# Patient Record
Sex: FEMALE | Race: WHITE | Employment: FULL TIME | URBAN - METROPOLITAN AREA
[De-identification: names, ages, dates, MRNs, and addresses within clinical notes are randomized per-mention and may not be internally consistent; named-entity substitution may affect disease eponyms.]

---

## 2017-01-20 ENCOUNTER — OFFICE VISIT (OUTPATIENT)
Dept: FAMILY MEDICINE CLINIC | Age: 32
End: 2017-01-20

## 2017-01-20 VITALS
BODY MASS INDEX: 25.76 KG/M2 | DIASTOLIC BLOOD PRESSURE: 80 MMHG | HEIGHT: 62 IN | TEMPERATURE: 99.6 F | RESPIRATION RATE: 20 BRPM | SYSTOLIC BLOOD PRESSURE: 130 MMHG | WEIGHT: 140 LBS | HEART RATE: 82 BPM

## 2017-01-20 DIAGNOSIS — R10.9 ABDOMINAL PAIN, UNSPECIFIED LOCATION: Primary | ICD-10-CM

## 2017-01-20 LAB
BILIRUBIN, POC: NORMAL
BLOOD URINE, POC: NORMAL
CLARITY, POC: CLEAR
COLOR, POC: NORMAL
GLUCOSE URINE, POC: NORMAL
KETONES, POC: NORMAL
LEUKOCYTE EST, POC: NORMAL
NITRITE, POC: NORMAL
PH, POC: 6.5
PROTEIN, POC: NORMAL
SPECIFIC GRAVITY, POC: 1.02
UROBILINOGEN, POC: NORMAL

## 2017-01-20 PROCEDURE — 81003 URINALYSIS AUTO W/O SCOPE: CPT | Performed by: FAMILY MEDICINE

## 2017-01-20 PROCEDURE — 99213 OFFICE O/P EST LOW 20 MIN: CPT | Performed by: FAMILY MEDICINE

## 2017-01-20 RX ORDER — CIPROFLOXACIN 500 MG/1
500 TABLET, FILM COATED ORAL 2 TIMES DAILY
Qty: 20 TABLET | Refills: 0 | Status: SHIPPED | OUTPATIENT
Start: 2017-01-20 | End: 2017-01-30

## 2017-01-20 ASSESSMENT — ENCOUNTER SYMPTOMS
DIARRHEA: 1
ABDOMINAL PAIN: 1
VOMITING: 0
CONSTIPATION: 0
NAUSEA: 1
BELCHING: 0

## 2017-03-07 ENCOUNTER — OFFICE VISIT (OUTPATIENT)
Dept: FAMILY MEDICINE CLINIC | Age: 32
End: 2017-03-07

## 2017-03-07 VITALS
HEART RATE: 70 BPM | WEIGHT: 141.6 LBS | OXYGEN SATURATION: 99 % | BODY MASS INDEX: 26.06 KG/M2 | RESPIRATION RATE: 16 BRPM | TEMPERATURE: 98.6 F | HEIGHT: 62 IN | DIASTOLIC BLOOD PRESSURE: 80 MMHG | SYSTOLIC BLOOD PRESSURE: 120 MMHG

## 2017-03-07 DIAGNOSIS — R09.89 THROAT TIGHTNESS: Primary | ICD-10-CM

## 2017-03-07 DIAGNOSIS — J30.9 ALLERGIC RHINITIS, UNSPECIFIED ALLERGIC RHINITIS TRIGGER, UNSPECIFIED RHINITIS SEASONALITY: ICD-10-CM

## 2017-03-07 PROCEDURE — 99213 OFFICE O/P EST LOW 20 MIN: CPT | Performed by: NURSE PRACTITIONER

## 2017-03-07 RX ORDER — METHYLPREDNISOLONE 4 MG/1
TABLET ORAL
Qty: 1 KIT | Refills: 0 | Status: SHIPPED | OUTPATIENT
Start: 2017-03-07 | End: 2017-03-13

## 2017-03-07 ASSESSMENT — ENCOUNTER SYMPTOMS
VOICE CHANGE: 0
TROUBLE SWALLOWING: 0
VOMITING: 0
RHINORRHEA: 0
NAUSEA: 0
ABDOMINAL PAIN: 0
SHORTNESS OF BREATH: 0
COUGH: 0
SORE THROAT: 1

## 2017-05-10 ENCOUNTER — OFFICE VISIT (OUTPATIENT)
Dept: FAMILY MEDICINE CLINIC | Age: 32
End: 2017-05-10

## 2017-05-10 DIAGNOSIS — N63.10 BREAST MASS, RIGHT: Primary | ICD-10-CM

## 2017-05-10 DIAGNOSIS — Z12.31 VISIT FOR SCREENING MAMMOGRAM: ICD-10-CM

## 2017-05-10 DIAGNOSIS — N63.20 BREAST MASS, LEFT: ICD-10-CM

## 2017-05-10 PROCEDURE — 99214 OFFICE O/P EST MOD 30 MIN: CPT | Performed by: FAMILY MEDICINE

## 2017-05-10 PROCEDURE — G0444 DEPRESSION SCREEN ANNUAL: HCPCS | Performed by: FAMILY MEDICINE

## 2017-05-10 ASSESSMENT — PATIENT HEALTH QUESTIONNAIRE - PHQ9
5. POOR APPETITE OR OVEREATING: 2
9. THOUGHTS THAT YOU WOULD BE BETTER OFF DEAD, OR OF HURTING YOURSELF: 0
7. TROUBLE CONCENTRATING ON THINGS, SUCH AS READING THE NEWSPAPER OR WATCHING TELEVISION: 2
3. TROUBLE FALLING OR STAYING ASLEEP: 2
SUM OF ALL RESPONSES TO PHQ QUESTIONS 1-9: 14
4. FEELING TIRED OR HAVING LITTLE ENERGY: 0
6. FEELING BAD ABOUT YOURSELF - OR THAT YOU ARE A FAILURE OR HAVE LET YOURSELF OR YOUR FAMILY DOWN: 2
SUM OF ALL RESPONSES TO PHQ9 QUESTIONS 1 & 2: 3
1. LITTLE INTEREST OR PLEASURE IN DOING THINGS: 0
2. FEELING DOWN, DEPRESSED OR HOPELESS: 3
8. MOVING OR SPEAKING SO SLOWLY THAT OTHER PEOPLE COULD HAVE NOTICED. OR THE OPPOSITE, BEING SO FIGETY OR RESTLESS THAT YOU HAVE BEEN MOVING AROUND A LOT MORE THAN USUAL: 3
10. IF YOU CHECKED OFF ANY PROBLEMS, HOW DIFFICULT HAVE THESE PROBLEMS MADE IT FOR YOU TO DO YOUR WORK, TAKE CARE OF THINGS AT HOME, OR GET ALONG WITH OTHER PEOPLE: 1

## 2017-05-15 VITALS
DIASTOLIC BLOOD PRESSURE: 70 MMHG | SYSTOLIC BLOOD PRESSURE: 128 MMHG | TEMPERATURE: 97.4 F | OXYGEN SATURATION: 96 % | HEART RATE: 63 BPM | WEIGHT: 137.13 LBS | BODY MASS INDEX: 25.23 KG/M2 | HEIGHT: 62 IN

## 2017-05-18 ENCOUNTER — HOSPITAL ENCOUNTER (OUTPATIENT)
Dept: WOMENS IMAGING | Age: 32
Discharge: HOME | End: 2017-05-18
Payer: COMMERCIAL

## 2017-05-18 ENCOUNTER — HOSPITAL ENCOUNTER (OUTPATIENT)
Dept: ULTRASOUND IMAGING | Age: 32
Discharge: HOME | End: 2017-05-18
Payer: COMMERCIAL

## 2017-05-18 DIAGNOSIS — N63.10 BREAST MASS, RIGHT: ICD-10-CM

## 2017-05-18 DIAGNOSIS — N63.0 BREAST LUMP: ICD-10-CM

## 2017-05-18 PROCEDURE — G0204 DX MAMMO INCL CAD BI: HCPCS

## 2017-05-18 PROCEDURE — 76642 ULTRASOUND BREAST LIMITED: CPT

## 2017-10-24 ENCOUNTER — NURSE ONLY (OUTPATIENT)
Dept: FAMILY MEDICINE CLINIC | Age: 32
End: 2017-10-24

## 2017-10-24 DIAGNOSIS — Z23 NEED FOR INFLUENZA VACCINATION: Primary | ICD-10-CM

## 2017-10-24 PROCEDURE — 90688 IIV4 VACCINE SPLT 0.5 ML IM: CPT | Performed by: FAMILY MEDICINE

## 2017-10-24 PROCEDURE — 90471 IMMUNIZATION ADMIN: CPT | Performed by: FAMILY MEDICINE

## 2017-11-07 DIAGNOSIS — Z12.39 SCREENING FOR BREAST CANCER: Primary | ICD-10-CM

## 2017-11-14 ENCOUNTER — HOSPITAL ENCOUNTER (OUTPATIENT)
Dept: WOMENS IMAGING | Age: 32
Discharge: HOME | End: 2017-11-14
Payer: COMMERCIAL

## 2017-11-14 DIAGNOSIS — Z12.39 SCREENING FOR BREAST CANCER: ICD-10-CM

## 2017-11-14 PROCEDURE — G0202 SCR MAMMO BI INCL CAD: HCPCS

## 2017-11-24 ENCOUNTER — OFFICE VISIT (OUTPATIENT)
Dept: FAMILY MEDICINE CLINIC | Age: 32
End: 2017-11-24

## 2017-11-24 VITALS
HEART RATE: 70 BPM | WEIGHT: 143.6 LBS | BODY MASS INDEX: 26.43 KG/M2 | OXYGEN SATURATION: 98 % | SYSTOLIC BLOOD PRESSURE: 128 MMHG | TEMPERATURE: 98.5 F | DIASTOLIC BLOOD PRESSURE: 68 MMHG | HEIGHT: 62 IN | RESPIRATION RATE: 16 BRPM

## 2017-11-24 DIAGNOSIS — L25.9 CONTACT DERMATITIS, UNSPECIFIED CONTACT DERMATITIS TYPE, UNSPECIFIED TRIGGER: Primary | ICD-10-CM

## 2017-11-24 PROCEDURE — G8419 CALC BMI OUT NRM PARAM NOF/U: HCPCS | Performed by: NURSE PRACTITIONER

## 2017-11-24 PROCEDURE — 1036F TOBACCO NON-USER: CPT | Performed by: NURSE PRACTITIONER

## 2017-11-24 PROCEDURE — 99213 OFFICE O/P EST LOW 20 MIN: CPT | Performed by: NURSE PRACTITIONER

## 2017-11-24 PROCEDURE — G8484 FLU IMMUNIZE NO ADMIN: HCPCS | Performed by: NURSE PRACTITIONER

## 2017-11-24 PROCEDURE — G8427 DOCREV CUR MEDS BY ELIG CLIN: HCPCS | Performed by: NURSE PRACTITIONER

## 2017-11-24 RX ORDER — CLOTRIMAZOLE AND BETAMETHASONE DIPROPIONATE 10; .64 MG/G; MG/G
CREAM TOPICAL
Qty: 45 G | Refills: 0 | Status: SHIPPED | OUTPATIENT
Start: 2017-11-24 | End: 2018-02-21 | Stop reason: ALTCHOICE

## 2017-11-27 ENCOUNTER — OFFICE VISIT (OUTPATIENT)
Dept: FAMILY MEDICINE CLINIC | Age: 32
End: 2017-11-27

## 2017-11-27 VITALS
WEIGHT: 143.06 LBS | SYSTOLIC BLOOD PRESSURE: 120 MMHG | HEART RATE: 72 BPM | RESPIRATION RATE: 12 BRPM | BODY MASS INDEX: 26.33 KG/M2 | TEMPERATURE: 97.2 F | DIASTOLIC BLOOD PRESSURE: 84 MMHG | HEIGHT: 62 IN

## 2017-11-27 DIAGNOSIS — L03.115 BILATERAL CELLULITIS OF LOWER LEG: Primary | ICD-10-CM

## 2017-11-27 DIAGNOSIS — L03.116 BILATERAL CELLULITIS OF LOWER LEG: Primary | ICD-10-CM

## 2017-11-27 PROCEDURE — 99213 OFFICE O/P EST LOW 20 MIN: CPT | Performed by: FAMILY MEDICINE

## 2017-11-27 PROCEDURE — G8484 FLU IMMUNIZE NO ADMIN: HCPCS | Performed by: FAMILY MEDICINE

## 2017-11-27 PROCEDURE — 1036F TOBACCO NON-USER: CPT | Performed by: FAMILY MEDICINE

## 2017-11-27 PROCEDURE — G8419 CALC BMI OUT NRM PARAM NOF/U: HCPCS | Performed by: FAMILY MEDICINE

## 2017-11-27 PROCEDURE — G8427 DOCREV CUR MEDS BY ELIG CLIN: HCPCS | Performed by: FAMILY MEDICINE

## 2017-11-27 RX ORDER — CEPHALEXIN 500 MG/1
500 CAPSULE ORAL 3 TIMES DAILY
Qty: 30 CAPSULE | Refills: 0 | Status: SHIPPED | OUTPATIENT
Start: 2017-11-27 | End: 2017-12-07

## 2017-11-27 NOTE — PROGRESS NOTES
 Scoliosis      Past Surgical History:   Procedure Laterality Date    WISDOM TOOTH EXTRACTION           REVIEW OF SYSTEMS:   REVIEW OF SYSTEMS:   Patient seen today for exam.  Denies any problems with hearing, headaches or vision. Denies any shortness of breath, chest pain, nausea or vomiting. No black stool, no blood in the stool. No heartburn. Denies any problems with constipation or diarrhea either. No dysuria type symptoms. Objective:     /84 (Site: Left Arm, Position: Sitting, Cuff Size: Medium Adult)   Pulse 72   Temp 97.2 °F (36.2 °C) (Temporal)   Resp 12   Ht 5' 2\" (1.575 m)   Wt 143 lb 1 oz (64.9 kg)   BMI 26.17 kg/m²     Physical Exam      O:  Alert and active female in no acute distress    EXTR:  Without edema./ good pulses bilat    Neurologic exam unremarkable. DTRs in upper and lower extremities within normal limits. Full strength noted    Skin- right lower leg with area of erythema 2\" x 5 mm wide no vesicles noted slightly tender to touch      Assessment:      1. Bilateral cellulitis of lower leg               Plan:        Orders Placed This Encounter   Medications    cephALEXin (KEFLEX) 500 MG capsule     Sig: Take 1 capsule by mouth 3 times daily for 10 days     Dispense:  30 capsule     Refill:  0     No orders of the defined types were placed in this encounter.           Health Maintenance Due   Topic Date Due    HIV screen  04/03/2002             Controlled Substances Monitoring:       will call me next 48 hours or worsens or changes in no        If anything worsens or changes please call us at once , follow-up in the office as planned,

## 2018-02-21 ENCOUNTER — OFFICE VISIT (OUTPATIENT)
Dept: FAMILY MEDICINE CLINIC | Age: 33
End: 2018-02-21
Payer: COMMERCIAL

## 2018-02-21 VITALS
HEIGHT: 62 IN | TEMPERATURE: 97.8 F | BODY MASS INDEX: 27.08 KG/M2 | WEIGHT: 147.19 LBS | SYSTOLIC BLOOD PRESSURE: 136 MMHG | HEART RATE: 79 BPM | RESPIRATION RATE: 12 BRPM | DIASTOLIC BLOOD PRESSURE: 86 MMHG

## 2018-02-21 DIAGNOSIS — Z00.00 ANNUAL PHYSICAL EXAM: Primary | ICD-10-CM

## 2018-02-21 DIAGNOSIS — R53.83 FATIGUE, UNSPECIFIED TYPE: ICD-10-CM

## 2018-02-21 DIAGNOSIS — F33.1 MODERATE EPISODE OF RECURRENT MAJOR DEPRESSIVE DISORDER (HCC): ICD-10-CM

## 2018-02-21 DIAGNOSIS — M67.479 GANGLION CYST OF FOOT: ICD-10-CM

## 2018-02-21 PROCEDURE — 99395 PREV VISIT EST AGE 18-39: CPT | Performed by: FAMILY MEDICINE

## 2018-02-22 NOTE — PROGRESS NOTES
and lower extremities within normal limits. Full strength noted    Skin- no lesions noted   Left ankle positive tenderness cyst noted for range of motion to ankle    Assessment:      1. Annual physical exam  Lipid Panel    CBC With Auto Differential    Comprehensive Metabolic Panel   2. Fatigue, unspecified type  TSH without Reflex    T4, Free    Vitamin D 25 Hydroxy    Vitamin B12 & Folate   3. Moderate episode of recurrent major depressive disorder ([facility] [address]     4. Ganglion cyst of foot               Plan:        No orders of the defined types were placed in this encounter.     Orders Placed This Encounter   Procedures    Lipid Panel     Standing Status:   Future     Standing Expiration Date:   2/21/2019     Order Specific Question:   Is Patient Fasting?/# of Hours     Answer:   yes    CBC With Auto Differential     Standing Status:   Future     Standing Expiration Date:   2/21/2019    Comprehensive Metabolic Panel     Standing Status:   Future     Standing Expiration Date:   2/21/2019    TSH without Reflex     Standing Status:   Future     Standing Expiration Date:   2/21/2019    T4, Free     Standing Status:   Future     Standing Expiration Date:   2/21/2019    Vitamin D 25 Hydroxy     Standing Status:   Future     Standing Expiration Date:   2/21/2019    Vitamin B12 & Folate     Standing Status:   Future     Standing Expiration Date:   2/21/2019           Health Maintenance Due   Topic Date Due    HIV screen  04/03/2002             Controlled Substances Monitoring:                we discussed a lot of things if her foot with worse would have her see Dr. Todd Cuadra is        If her mood does get worse she will call me she's really good candidate for low-dose SSRI such as Lexapro that we talked about                    If anything worsens or changes please call us at once , follow-up in the office as planned,

## 2018-02-23 DIAGNOSIS — Z00.00 ANNUAL PHYSICAL EXAM: ICD-10-CM

## 2018-02-23 DIAGNOSIS — R53.83 FATIGUE, UNSPECIFIED TYPE: ICD-10-CM

## 2018-02-23 LAB
ALBUMIN SERPL-MCNC: 4.1 G/DL (ref 3.9–4.9)
ALP BLD-CCNC: 46 U/L (ref 40–130)
ALT SERPL-CCNC: 20 U/L (ref 0–33)
ANION GAP SERPL CALCULATED.3IONS-SCNC: 14 MEQ/L (ref 7–13)
AST SERPL-CCNC: 24 U/L (ref 0–35)
BASOPHILS ABSOLUTE: 0.1 K/UL (ref 0–0.2)
BASOPHILS RELATIVE PERCENT: 1 %
BILIRUB SERPL-MCNC: 0.7 MG/DL (ref 0–1.2)
BUN BLDV-MCNC: 11 MG/DL (ref 6–20)
CALCIUM SERPL-MCNC: 9.5 MG/DL (ref 8.6–10.2)
CHLORIDE BLD-SCNC: 103 MEQ/L (ref 98–107)
CHOLESTEROL, TOTAL: 137 MG/DL (ref 0–199)
CO2: 23 MEQ/L (ref 22–29)
CREAT SERPL-MCNC: 0.72 MG/DL (ref 0.5–0.9)
EOSINOPHILS ABSOLUTE: 0.1 K/UL (ref 0–0.7)
EOSINOPHILS RELATIVE PERCENT: 2.2 %
FOLATE: >20 NG/ML (ref 7.3–26.1)
GFR AFRICAN AMERICAN: >60
GFR NON-AFRICAN AMERICAN: >60
GLOBULIN: 2.2 G/DL (ref 2.3–3.5)
GLUCOSE BLD-MCNC: 90 MG/DL (ref 74–109)
HCT VFR BLD CALC: 36.9 % (ref 37–47)
HDLC SERPL-MCNC: 51 MG/DL (ref 40–59)
HEMOGLOBIN: 12 G/DL (ref 12–16)
LDL CHOLESTEROL CALCULATED: 78 MG/DL (ref 0–129)
LYMPHOCYTES ABSOLUTE: 1.9 K/UL (ref 1–4.8)
LYMPHOCYTES RELATIVE PERCENT: 36.7 %
MCH RBC QN AUTO: 26.9 PG (ref 27–31.3)
MCHC RBC AUTO-ENTMCNC: 32.5 % (ref 33–37)
MCV RBC AUTO: 82.7 FL (ref 82–100)
MONOCYTES ABSOLUTE: 0.5 K/UL (ref 0.2–0.8)
MONOCYTES RELATIVE PERCENT: 9.6 %
NEUTROPHILS ABSOLUTE: 2.6 K/UL (ref 1.4–6.5)
NEUTROPHILS RELATIVE PERCENT: 50.5 %
PDW BLD-RTO: 16.3 % (ref 11.5–14.5)
PLATELET # BLD: 222 K/UL (ref 130–400)
POTASSIUM SERPL-SCNC: 4.7 MEQ/L (ref 3.5–5.1)
RBC # BLD: 4.46 M/UL (ref 4.2–5.4)
SODIUM BLD-SCNC: 140 MEQ/L (ref 132–144)
T4 FREE: 1.01 NG/DL (ref 0.93–1.7)
TOTAL PROTEIN: 6.3 G/DL (ref 6.4–8.1)
TRIGL SERPL-MCNC: 41 MG/DL (ref 0–200)
TSH SERPL DL<=0.05 MIU/L-ACNC: 1.9 UIU/ML (ref 0.27–4.2)
VITAMIN B-12: 1747 PG/ML (ref 232–1245)
VITAMIN D 25-HYDROXY: 22 NG/ML (ref 30–100)
WBC # BLD: 5.1 K/UL (ref 4.8–10.8)

## 2018-06-19 RX ORDER — ESCITALOPRAM OXALATE 10 MG/1
10 TABLET ORAL DAILY
Qty: 30 TABLET | Refills: 1 | Status: SHIPPED | OUTPATIENT
Start: 2018-06-19 | End: 2018-07-18 | Stop reason: SDUPTHER

## 2018-07-18 ENCOUNTER — OFFICE VISIT (OUTPATIENT)
Dept: FAMILY MEDICINE CLINIC | Age: 33
End: 2018-07-18
Payer: COMMERCIAL

## 2018-07-18 VITALS
BODY MASS INDEX: 26.13 KG/M2 | WEIGHT: 142 LBS | HEIGHT: 62 IN | TEMPERATURE: 97 F | RESPIRATION RATE: 20 BRPM | HEART RATE: 63 BPM | SYSTOLIC BLOOD PRESSURE: 118 MMHG | DIASTOLIC BLOOD PRESSURE: 78 MMHG

## 2018-07-18 DIAGNOSIS — F41.9 ANXIETY: ICD-10-CM

## 2018-07-18 DIAGNOSIS — F32.A DEPRESSION, UNSPECIFIED DEPRESSION TYPE: ICD-10-CM

## 2018-07-18 PROCEDURE — G8419 CALC BMI OUT NRM PARAM NOF/U: HCPCS | Performed by: FAMILY MEDICINE

## 2018-07-18 PROCEDURE — 99213 OFFICE O/P EST LOW 20 MIN: CPT | Performed by: FAMILY MEDICINE

## 2018-07-18 PROCEDURE — 1036F TOBACCO NON-USER: CPT | Performed by: FAMILY MEDICINE

## 2018-07-18 PROCEDURE — G8427 DOCREV CUR MEDS BY ELIG CLIN: HCPCS | Performed by: FAMILY MEDICINE

## 2018-07-18 RX ORDER — ESCITALOPRAM OXALATE 10 MG/1
10 TABLET ORAL DAILY
Qty: 30 TABLET | Refills: 5 | Status: SHIPPED | OUTPATIENT
Start: 2018-07-18 | End: 2018-12-03 | Stop reason: SDUPTHER

## 2018-07-18 NOTE — PROGRESS NOTES
Subjective:      Patient ID: Jim Greer is a 34 y.o. female. Chief Complaint   Patient presents with    Other     Patient present today requesting a paper be filled out - Medical statement for foster caregiver/ adoptive applicant     Depression     She would like like to follow up depression. She recently started takig Lexapro 10 mg 1 qd. She reports that she is nauseated since starting the medication. But nausea is starting to subside. She reports that 10 mg 1 qd is working significantly for depression       HPI    Here today follow-up on her depression doing pretty well states the Lexapro is really made things smooth for her increases serotonin started make her nauseous at first but is starting to subside          She does less angry at little things that used to bother her and feels like he is definitely going a right direction still seeing her counselor doing well with that         also thinking about fostering and possibly adoptive parents which is in she will be wonderful             Allergies   Allergen Reactions    Latex Rash    Cefdinir      Diarrhea        Levaquin [Levofloxacin]      Severe nerve pain     Outpatient Encounter Prescriptions as of 7/18/2018   Medication Sig Dispense Refill    escitalopram (LEXAPRO) 10 MG tablet Take 1 tablet by mouth daily 30 tablet 5    magnesium (MAGNESIUM-OXIDE) 250 MG TABS tablet Take 250 mg by mouth daily      [DISCONTINUED] escitalopram (LEXAPRO) 10 MG tablet Take 1 tablet by mouth daily 30 tablet 1     No facility-administered encounter medications on file as of 7/18/2018. Social History     Social History    Marital status:      Spouse name: N/A    Number of children: N/A    Years of education: N/A     Occupational History    Not on file.      Social History Main Topics    Smoking status: Never Smoker    Smokeless tobacco: Never Used    Alcohol use Yes     3 Cans of beer per week    Drug use: No    Sexual activity: Yes     Other Topics Concern    Not on file     Social History Narrative    No narrative on file     Family History   Problem Relation Age of Onset    Lupus Father     Breast Cancer Neg Hx     Cancer Neg Hx     Colon Cancer Neg Hx     Diabetes Neg Hx     Eclampsia Neg Hx     Hypertension Neg Hx     Ovarian Cancer Neg Hx      Labor Neg Hx     Spont Abortions Neg Hx     Stroke Neg Hx      Past Medical History:   Diagnosis Date    Acne     Anxiety 2018    Depression 2018    Scoliosis      Past Surgical History:   Procedure Laterality Date    WISDOM TOOTH EXTRACTION           REVIEW OF SYSTEMS:   REVIEW OF SYSTEMS:   Patient seen today for exam.  Denies any problems with hearing, headaches or vision. Denies any shortness of breath, chest pain, nausea or vomiting. No black stool, no blood in the stool. No heartburn. Denies any problems with constipation or diarrhea either. No dysuria type symptoms. Objective:     /78   Pulse 63   Temp 97 °F (36.1 °C) (Temporal)   Resp 20   Ht 5' 2\" (1.575 m)   Wt 142 lb (64.4 kg)   LMP 2018   Breastfeeding? No   BMI 25.97 kg/m²     Physical Exam      O:  Alert and active female in no acute distress  HEENT:  TMs clear. Pharynx neg. Nares clear, no drainage noted  Neck supple/ no adenopathy   HEART:  RRR without murmur/ no carotid bruits  LUNGS:  Clear to auscultation bilaterally, no wheeze or rhonchi noted  THYROID: neg masses or nodularity  ABDOMEN:  Soft x4. Bowel sounds positive. No masses or organomegaly,  Negative tenderness, guarding or rebound. EXTR:  Without edema./ good pulses bilat    Neurologic exam unremarkable. DTRs in upper and lower extremities within normal limits. Full strength noted    Skin- no lesions noted       Assessment:       Diagnosis Orders   1. Depression, unspecified depression type  escitalopram (LEXAPRO) 10 MG tablet   2.  Anxiety  escitalopram (LEXAPRO) 10 MG tablet             Plan:        Orders

## 2018-11-21 ENCOUNTER — NURSE ONLY (OUTPATIENT)
Dept: FAMILY MEDICINE CLINIC | Age: 33
End: 2018-11-21
Payer: COMMERCIAL

## 2018-11-21 DIAGNOSIS — Z23 NEED FOR INFLUENZA VACCINATION: Primary | ICD-10-CM

## 2018-11-21 PROCEDURE — 90688 IIV4 VACCINE SPLT 0.5 ML IM: CPT | Performed by: FAMILY MEDICINE

## 2018-11-21 PROCEDURE — 90471 IMMUNIZATION ADMIN: CPT | Performed by: FAMILY MEDICINE

## 2018-12-03 DIAGNOSIS — F41.9 ANXIETY: ICD-10-CM

## 2018-12-03 DIAGNOSIS — F32.A DEPRESSION, UNSPECIFIED DEPRESSION TYPE: ICD-10-CM

## 2018-12-03 RX ORDER — ESCITALOPRAM OXALATE 10 MG/1
10 TABLET ORAL DAILY
Qty: 30 TABLET | Refills: 1 | Status: SHIPPED | OUTPATIENT
Start: 2018-12-03 | End: 2019-02-07 | Stop reason: SDUPTHER

## 2019-02-07 DIAGNOSIS — F41.9 ANXIETY: ICD-10-CM

## 2019-02-07 DIAGNOSIS — F32.A DEPRESSION, UNSPECIFIED DEPRESSION TYPE: ICD-10-CM

## 2019-02-07 RX ORDER — ESCITALOPRAM OXALATE 10 MG/1
10 TABLET ORAL DAILY
Qty: 30 TABLET | Refills: 1 | Status: SHIPPED | OUTPATIENT
Start: 2019-02-07 | End: 2019-03-20 | Stop reason: SDUPTHER

## 2019-03-20 DIAGNOSIS — F41.9 ANXIETY: ICD-10-CM

## 2019-03-20 DIAGNOSIS — F32.A DEPRESSION, UNSPECIFIED DEPRESSION TYPE: ICD-10-CM

## 2019-03-20 RX ORDER — ESCITALOPRAM OXALATE 10 MG/1
10 TABLET ORAL DAILY
Qty: 30 TABLET | Refills: 0 | Status: SHIPPED | OUTPATIENT
Start: 2019-03-20

## 2023-03-10 ENCOUNTER — OFFICE VISIT (OUTPATIENT)
Dept: PRIMARY CARE | Facility: CLINIC | Age: 38
End: 2023-03-10
Payer: COMMERCIAL

## 2023-03-10 VITALS
WEIGHT: 208.13 LBS | SYSTOLIC BLOOD PRESSURE: 110 MMHG | TEMPERATURE: 97.4 F | BODY MASS INDEX: 38.3 KG/M2 | DIASTOLIC BLOOD PRESSURE: 70 MMHG | HEART RATE: 69 BPM | OXYGEN SATURATION: 97 % | HEIGHT: 62 IN | RESPIRATION RATE: 18 BRPM

## 2023-03-10 DIAGNOSIS — G44.209 ACUTE NON INTRACTABLE TENSION-TYPE HEADACHE: Primary | ICD-10-CM

## 2023-03-10 DIAGNOSIS — K13.79 HYPERTROPHY OF UVULA: ICD-10-CM

## 2023-03-10 PROBLEM — R39.9 UTI SYMPTOMS: Status: ACTIVE | Noted: 2023-03-10

## 2023-03-10 PROBLEM — Z20.822 ENCOUNTER FOR LABORATORY TESTING FOR COVID-19 VIRUS: Status: ACTIVE | Noted: 2023-03-10

## 2023-03-10 PROBLEM — N34.2 URETHRITIS: Status: ACTIVE | Noted: 2023-03-10

## 2023-03-10 PROBLEM — U07.1 COVID-19 VIRUS DETECTED: Status: ACTIVE | Noted: 2023-03-10

## 2023-03-10 PROBLEM — N63.0 BREAST LUMP: Status: ACTIVE | Noted: 2023-03-10

## 2023-03-10 PROBLEM — F32.A DEPRESSION: Status: ACTIVE | Noted: 2023-03-10

## 2023-03-10 PROBLEM — R53.83 FATIGUE: Status: ACTIVE | Noted: 2023-03-10

## 2023-03-10 PROBLEM — R10.11 ABDOMINAL PAIN, ACUTE, RIGHT UPPER QUADRANT: Status: ACTIVE | Noted: 2023-03-10

## 2023-03-10 PROBLEM — R05.9 COUGH: Status: ACTIVE | Noted: 2023-03-10

## 2023-03-10 PROBLEM — R51.9 HEADACHE: Status: ACTIVE | Noted: 2023-03-10

## 2023-03-10 PROBLEM — M79.10 MUSCLE ACHE: Status: ACTIVE | Noted: 2023-03-10

## 2023-03-10 PROBLEM — M79.2 ATYPICAL NEURALGIA: Status: ACTIVE | Noted: 2023-03-10

## 2023-03-10 PROBLEM — J38.3 OTHER DISEASES OF VOCAL CORDS: Status: ACTIVE | Noted: 2023-03-10

## 2023-03-10 PROBLEM — R09.A2 GLOBUS SENSATION: Status: ACTIVE | Noted: 2023-03-10

## 2023-03-10 PROBLEM — L30.8 ECZEMA CRAQUELE: Status: ACTIVE | Noted: 2023-03-10

## 2023-03-10 PROBLEM — R56.9 OBSERVED SEIZURE-LIKE ACTIVITY (MULTI): Status: ACTIVE | Noted: 2023-03-10

## 2023-03-10 PROBLEM — R42 DIZZINESS: Status: ACTIVE | Noted: 2023-03-10

## 2023-03-10 PROBLEM — M25.561 KNEE PAIN, RIGHT: Status: ACTIVE | Noted: 2023-03-10

## 2023-03-10 PROBLEM — F41.1 GENERALIZED ANXIETY DISORDER: Status: ACTIVE | Noted: 2023-03-10

## 2023-03-10 PROBLEM — R00.2 HEART PALPITATIONS: Status: ACTIVE | Noted: 2023-03-10

## 2023-03-10 PROBLEM — L98.9 SKIN LESION: Status: ACTIVE | Noted: 2023-03-10

## 2023-03-10 PROBLEM — M77.8 EXTENSOR TENDINITIS OF FOOT: Status: ACTIVE | Noted: 2023-03-10

## 2023-03-10 PROCEDURE — 1036F TOBACCO NON-USER: CPT | Performed by: FAMILY MEDICINE

## 2023-03-10 PROCEDURE — 99213 OFFICE O/P EST LOW 20 MIN: CPT | Performed by: FAMILY MEDICINE

## 2023-03-10 RX ORDER — METHYLPREDNISOLONE 4 MG/1
TABLET ORAL
COMMUNITY
Start: 2023-02-17 | End: 2023-03-10 | Stop reason: ALTCHOICE

## 2023-03-10 RX ORDER — NITROFURANTOIN 25; 75 MG/1; MG/1
CAPSULE ORAL EVERY 12 HOURS
COMMUNITY
Start: 2022-09-19 | End: 2023-03-10 | Stop reason: ALTCHOICE

## 2023-03-10 RX ORDER — AZITHROMYCIN 250 MG/1
TABLET, FILM COATED ORAL
COMMUNITY
Start: 2023-02-17 | End: 2023-03-10 | Stop reason: ALTCHOICE

## 2023-03-10 RX ORDER — ONDANSETRON 4 MG/1
4 TABLET, ORALLY DISINTEGRATING ORAL EVERY 8 HOURS PRN
Qty: 20 TABLET | Refills: 3 | Status: SHIPPED | OUTPATIENT
Start: 2023-03-10 | End: 2023-03-17

## 2023-03-10 RX ORDER — PHENTERMINE HYDROCHLORIDE 15 MG/1
CAPSULE ORAL
COMMUNITY
Start: 2022-12-01 | End: 2023-03-10 | Stop reason: ALTCHOICE

## 2023-03-10 RX ORDER — ESCITALOPRAM OXALATE 10 MG/1
1 TABLET ORAL DAILY
COMMUNITY
Start: 2019-07-29 | End: 2023-07-19

## 2023-03-10 NOTE — PROGRESS NOTES
"Subjective   Patient ID: Cassia Martin is a 37 y.o. female who presents for Migraine and inflamed uvula.  HPI  Patient presents in office today for migraines   This has been ongoing x 3 days   OTC ibuprofen 400 - 600 3 times a day   Pain is 8/10  Admits that this has happened before   Admits that it even hurts to touch her hair.     Also presents in office today for an inflamed Uvula   This has been ongoing x 2 days   Admits that she had a cough when she noticed this   Was on a Z-adrian about 3 weeks ago  Admits that this does look swollen   Admits that she would like this looked at today.     Review of systems  ; Patient seen today for exam denies any problems with headaches or vision, denies any shortness of breath chest pain nausea or vomiting, no black stool no blood in the stool no heartburn type symptoms denies any problems with constipation or diarrhea, and no dysuria-type symptoms    The patient's allergies medications were reviewed with them today    The patient's social family and surgical history or also reviewed here today, along with her past medical history.     Objective     Alert and active in  no acute distress  HEENT TMs clear oropharynx negative nares clear no drainage noted neck supple  With no adenopathy   Heart regular rate and rhythm without murmur and no carotid bruits  Lungs- clear to auscultation bilaterally, no wheeze or rhonchi noted  Thyroid -negative masses or nodularity  Abdomen- soft times four quadrants , bowel sounds positive no masses or organomegaly, negative tenderness guarding or rebound  Neurological exam unremarkable- DTRs in upper and lower extremities within normal limits.   skin -no lesions noted      /70 (BP Location: Right arm, Patient Position: Sitting, BP Cuff Size: Adult)   Pulse 69   Temp 36.3 °C (97.4 °F) (Temporal)   Resp 18   Ht 1.575 m (5' 2\")   Wt 94.4 kg (208 lb 2 oz)   SpO2 97%   BMI 38.07 kg/m²     Allergies   Allergen Reactions    Cefdinir " Diarrhea and Hives    Cyclosporine Hives    Latex Rash    Quinolones Rash    Sulfa (Sulfonamide Antibiotics) Hives and Rash       Assessment/Plan   Problem List Items Addressed This Visit          Nervous    Headache - Primary    Relevant Medications    ondansetron ODT (Zofran-ODT) 4 mg disintegrating tablet     Other Visit Diagnoses       Hypertrophy of uvula              Her child sees Dr. Guevara we will see him as her uvula is really long I does have some scar tissue on it I think it needs a slight clip as she does feel it oftentimes when she gets sick it definitely gets inflamed will let us know if things worsen or change    Also give her some samples of Nurtec to try 75 mcg as needed as needed along with Zofran to see if it helps with her headaches    If anything worsens or changes please call us at once, follow up in the office as planned,

## 2023-03-27 ENCOUNTER — OFFICE VISIT (OUTPATIENT)
Dept: PRIMARY CARE | Facility: CLINIC | Age: 38
End: 2023-03-27
Payer: COMMERCIAL

## 2023-03-27 VITALS
HEART RATE: 74 BPM | TEMPERATURE: 97.4 F | WEIGHT: 199.5 LBS | SYSTOLIC BLOOD PRESSURE: 102 MMHG | DIASTOLIC BLOOD PRESSURE: 80 MMHG | BODY MASS INDEX: 36.71 KG/M2 | HEIGHT: 62 IN | OXYGEN SATURATION: 98 % | RESPIRATION RATE: 18 BRPM

## 2023-03-27 DIAGNOSIS — Z00.00 ENCOUNTER FOR GENERAL HEALTH EXAMINATION: ICD-10-CM

## 2023-03-27 DIAGNOSIS — R11.2 NAUSEA AND VOMITING, UNSPECIFIED VOMITING TYPE: ICD-10-CM

## 2023-03-27 DIAGNOSIS — E66.09 CLASS 2 OBESITY DUE TO EXCESS CALORIES WITHOUT SERIOUS COMORBIDITY WITH BODY MASS INDEX (BMI) OF 36.0 TO 36.9 IN ADULT: ICD-10-CM

## 2023-03-27 DIAGNOSIS — R19.7 DIARRHEA, UNSPECIFIED TYPE: ICD-10-CM

## 2023-03-27 DIAGNOSIS — R10.84 GENERALIZED ABDOMINAL PAIN: Primary | ICD-10-CM

## 2023-03-27 PROBLEM — R56.9 OBSERVED SEIZURE-LIKE ACTIVITY (MULTI): Status: RESOLVED | Noted: 2023-03-10 | Resolved: 2023-03-27

## 2023-03-27 LAB
POC APPEARANCE, URINE: CLEAR
POC BILIRUBIN, URINE: NEGATIVE
POC BLOOD, URINE: NEGATIVE
POC COLOR, URINE: YELLOW
POC GLUCOSE, URINE: NEGATIVE MG/DL
POC KETONES, URINE: NEGATIVE MG/DL
POC LEUKOCYTES, URINE: NEGATIVE
POC NITRITE,URINE: NEGATIVE
POC PH, URINE: 5.5 PH
POC PROTEIN, URINE: NEGATIVE MG/DL
POC SPECIFIC GRAVITY, URINE: <=1.005
POC UROBILINOGEN, URINE: 0.2 EU/DL

## 2023-03-27 PROCEDURE — 3008F BODY MASS INDEX DOCD: CPT | Performed by: FAMILY MEDICINE

## 2023-03-27 PROCEDURE — 99215 OFFICE O/P EST HI 40 MIN: CPT | Performed by: FAMILY MEDICINE

## 2023-03-27 PROCEDURE — 1036F TOBACCO NON-USER: CPT | Performed by: FAMILY MEDICINE

## 2023-03-27 PROCEDURE — 81003 URINALYSIS AUTO W/O SCOPE: CPT | Performed by: FAMILY MEDICINE

## 2023-03-27 RX ORDER — ONDANSETRON 4 MG/1
4 TABLET, FILM COATED ORAL EVERY 8 HOURS PRN
COMMUNITY

## 2023-03-27 RX ORDER — METRONIDAZOLE 250 MG/1
250 TABLET ORAL 3 TIMES DAILY
Qty: 21 TABLET | Refills: 0 | Status: SHIPPED | OUTPATIENT
Start: 2023-03-27 | End: 2023-04-03

## 2023-03-27 NOTE — PROGRESS NOTES
Subjective   Patient ID: Cassia Martin is a 37 y.o. female who presents for Abdominal Pain, Fever, and decreased urination.    HPI    Patient presents in office today for abdominal pain   Ongoing x 4 days   Pain can become a 7/10  This is mainly on the right side of her abdomin   OTC Tylenol for fever   Has been having on and off fevers this morning   Admits that she has also been getting the sweats, unsure if this is related to the fever breaking or not   Admits that this does come and go   Admits that she has been having diarrhea   Admits that she has been vomiting as well   Admits that she has not been urination as much due to not being able to keep any water down   Admits that she did take Zofran yesterday for the nausea   Admits that she did take a covid test, this was negative   Ate fish maki Friday.  States it was baked.  Not near her period at all.  Denies black or bloody stool.  Has been belching more.  Everyone else at home is well.  Diarrhea is a pure water, and yellow.    Review of systems  ; Patient seen today for exam denies any problems with headaches or vision, denies any shortness of breath chest pain nausea or vomiting, no black stool no blood in the stool no heartburn type symptoms denies any problems with constipation or diarrhea, and no dysuria-type symptoms    The patient's allergies medications were reviewed with them today    The patient's social family and surgical history or also reviewed here today, along with her past medical history.     Objective     Alert and active in  no acute distress  HEENT TMs clear oropharynx negative nares clear no drainage noted neck supple  With no adenopathy   Heart regular rate and rhythm without murmur and no carotid bruits  Lungs- clear to auscultation bilaterally, no wheeze or rhonchi noted  Thyroid -negative masses or nodularity  Abdominal exam positive pain no the right upper and lower quadrant with deep palpation some rebounding and guarding at this  "time otherwise nonsurgical but very painful to the touch  Neurological exam unremarkable- DTRs in upper and lower extremities within normal limits.   skin -no lesions noted      /80 (BP Location: Left arm, Patient Position: Sitting, BP Cuff Size: Adult)   Pulse 74   Temp 36.3 °C (97.4 °F) (Temporal)   Resp 18   Ht 1.575 m (5' 2\")   Wt 90.5 kg (199 lb 8 oz)   LMP 03/13/2023 (Approximate)   SpO2 98%   BMI 36.49 kg/m²     Allergies   Allergen Reactions    Cefdinir Diarrhea and Hives    Cyclosporine Hives    Latex Rash    Quinolones Rash    Sulfa (Sulfonamide Antibiotics) Hives and Rash       Assessment/Plan   Problem List Items Addressed This Visit    None  Visit Diagnoses       Generalized abdominal pain    -  Primary    Relevant Orders    POCT UA Automated manually resulted (Completed)    CT abdomen pelvis wo IV contrast (Completed)    Encounter for general health examination        Relevant Orders    CBC and Auto Differential    Comprehensive metabolic panel    BMI 36.0-36.9,adult        Class 2 obesity due to excess calories without serious comorbidity with body mass index (BMI) of 36.0 to 36.9 in adult        Diarrhea, unspecified type        Relevant Medications    metroNIDAZOLE (Flagyl) 250 mg tablet    Other Relevant Orders    CT abdomen pelvis wo IV contrast (Completed)    Nausea and vomiting, unspecified vomiting type              Discussed patient's BMI and to institute calorie reduction and increase exercise to decrease risk of diabetes and heart disease in the future.    UA performed, unremarkable.    CT scan of abdomen and pelvis ordered, STAT.  Depending on results, we may order antibiotics.    Labs have been ordered, she/he will have these performed and we will contact her/him with results.  (CBC, CMP)    If anything worsens or changes please call us at once, follow up in the office as planned.        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

## 2023-06-16 ENCOUNTER — TELEPHONE (OUTPATIENT)
Dept: PRIMARY CARE | Facility: CLINIC | Age: 38
End: 2023-06-16
Payer: COMMERCIAL

## 2023-07-17 DIAGNOSIS — F32.A DEPRESSION, UNSPECIFIED DEPRESSION TYPE: ICD-10-CM

## 2023-07-19 RX ORDER — ESCITALOPRAM OXALATE 10 MG/1
TABLET ORAL
Qty: 90 TABLET | Refills: 0 | Status: SHIPPED | OUTPATIENT
Start: 2023-07-19 | End: 2023-12-08 | Stop reason: ALTCHOICE

## 2023-09-05 ENCOUNTER — TELEPHONE (OUTPATIENT)
Dept: PRIMARY CARE | Facility: CLINIC | Age: 38
End: 2023-09-05
Payer: COMMERCIAL

## 2023-09-05 DIAGNOSIS — D50.8 OTHER IRON DEFICIENCY ANEMIA: Primary | ICD-10-CM

## 2023-09-05 NOTE — TELEPHONE ENCOUNTER
Cassia Martin [id number] [place] [id number] Clinical Support Staff  Phone Number: [phone]     Hi Dr. LI,    Can you please add an iron panel to my cbc and bmp before I get them drawn? I’ve been so tired the last 6 months and just can’t seem to get enough sleep even after 9 hours of sleep.    Thank you

## 2023-09-06 ENCOUNTER — LAB (OUTPATIENT)
Dept: LAB | Facility: LAB | Age: 38
End: 2023-09-06
Payer: COMMERCIAL

## 2023-09-06 DIAGNOSIS — D50.8 OTHER IRON DEFICIENCY ANEMIA: ICD-10-CM

## 2023-09-06 DIAGNOSIS — Z00.00 ENCOUNTER FOR GENERAL HEALTH EXAMINATION: ICD-10-CM

## 2023-09-06 LAB
ALANINE AMINOTRANSFERASE (SGPT) (U/L) IN SER/PLAS: 37 U/L (ref 7–45)
ALBUMIN (G/DL) IN SER/PLAS: 4.1 G/DL (ref 3.4–5)
ALKALINE PHOSPHATASE (U/L) IN SER/PLAS: 61 U/L (ref 33–110)
ANION GAP IN SER/PLAS: 9 MMOL/L (ref 10–20)
ASPARTATE AMINOTRANSFERASE (SGOT) (U/L) IN SER/PLAS: 27 U/L (ref 9–39)
BASOPHILS (10*3/UL) IN BLOOD BY AUTOMATED COUNT: 0.08 X10E9/L (ref 0–0.1)
BASOPHILS/100 LEUKOCYTES IN BLOOD BY AUTOMATED COUNT: 0.9 % (ref 0–2)
BILIRUBIN TOTAL (MG/DL) IN SER/PLAS: 0.4 MG/DL (ref 0–1.2)
CALCIUM (MG/DL) IN SER/PLAS: 9.7 MG/DL (ref 8.6–10.3)
CARBON DIOXIDE, TOTAL (MMOL/L) IN SER/PLAS: 28 MMOL/L (ref 21–32)
CHLORIDE (MMOL/L) IN SER/PLAS: 105 MMOL/L (ref 98–107)
COBALAMIN (VITAMIN B12) (PG/ML) IN SER/PLAS: 816 PG/ML (ref 211–911)
CREATININE (MG/DL) IN SER/PLAS: 0.84 MG/DL (ref 0.5–1.05)
EOSINOPHILS (10*3/UL) IN BLOOD BY AUTOMATED COUNT: 0.17 X10E9/L (ref 0–0.7)
EOSINOPHILS/100 LEUKOCYTES IN BLOOD BY AUTOMATED COUNT: 2 % (ref 0–6)
ERYTHROCYTE DISTRIBUTION WIDTH (RATIO) BY AUTOMATED COUNT: 15.4 % (ref 11.5–14.5)
ERYTHROCYTE MEAN CORPUSCULAR HEMOGLOBIN CONCENTRATION (G/DL) BY AUTOMATED: 32.8 G/DL (ref 32–36)
ERYTHROCYTE MEAN CORPUSCULAR VOLUME (FL) BY AUTOMATED COUNT: 83 FL (ref 80–100)
ERYTHROCYTES (10*6/UL) IN BLOOD BY AUTOMATED COUNT: 4.5 X10E12/L (ref 4–5.2)
GFR FEMALE: >90 ML/MIN/1.73M2
GLUCOSE (MG/DL) IN SER/PLAS: 112 MG/DL (ref 74–99)
HEMATOCRIT (%) IN BLOOD BY AUTOMATED COUNT: 37.2 % (ref 36–46)
HEMOGLOBIN (G/DL) IN BLOOD: 12.2 G/DL (ref 12–16)
IMMATURE GRANULOCYTES/100 LEUKOCYTES IN BLOOD BY AUTOMATED COUNT: 0.7 % (ref 0–0.9)
IRON (UG/DL) IN SER/PLAS: 62 UG/DL (ref 35–150)
IRON BINDING CAPACITY (UG/DL) IN SER/PLAS: 453 UG/DL (ref 240–445)
IRON SATURATION (%) IN SER/PLAS: 14 % (ref 25–45)
LEUKOCYTES (10*3/UL) IN BLOOD BY AUTOMATED COUNT: 8.6 X10E9/L (ref 4.4–11.3)
LYMPHOCYTES (10*3/UL) IN BLOOD BY AUTOMATED COUNT: 2.78 X10E9/L (ref 1.2–4.8)
LYMPHOCYTES/100 LEUKOCYTES IN BLOOD BY AUTOMATED COUNT: 32.2 % (ref 13–44)
MONOCYTES (10*3/UL) IN BLOOD BY AUTOMATED COUNT: 0.7 X10E9/L (ref 0.1–1)
MONOCYTES/100 LEUKOCYTES IN BLOOD BY AUTOMATED COUNT: 8.1 % (ref 2–10)
NEUTROPHILS (10*3/UL) IN BLOOD BY AUTOMATED COUNT: 4.85 X10E9/L (ref 1.2–7.7)
NEUTROPHILS/100 LEUKOCYTES IN BLOOD BY AUTOMATED COUNT: 56.1 % (ref 40–80)
PLATELETS (10*3/UL) IN BLOOD AUTOMATED COUNT: 269 X10E9/L (ref 150–450)
POTASSIUM (MMOL/L) IN SER/PLAS: 4.2 MMOL/L (ref 3.5–5.3)
PROTEIN TOTAL: 6.8 G/DL (ref 6.4–8.2)
SODIUM (MMOL/L) IN SER/PLAS: 138 MMOL/L (ref 136–145)
UREA NITROGEN (MG/DL) IN SER/PLAS: 13 MG/DL (ref 6–23)

## 2023-09-06 PROCEDURE — 82607 VITAMIN B-12: CPT

## 2023-09-06 PROCEDURE — 83540 ASSAY OF IRON: CPT

## 2023-09-06 PROCEDURE — 80053 COMPREHEN METABOLIC PANEL: CPT

## 2023-09-06 PROCEDURE — 82728 ASSAY OF FERRITIN: CPT

## 2023-09-06 PROCEDURE — 83550 IRON BINDING TEST: CPT

## 2023-09-06 PROCEDURE — 36415 COLL VENOUS BLD VENIPUNCTURE: CPT

## 2023-09-06 PROCEDURE — 85025 COMPLETE CBC W/AUTO DIFF WBC: CPT

## 2023-09-07 ENCOUNTER — TELEPHONE (OUTPATIENT)
Dept: PRIMARY CARE | Facility: CLINIC | Age: 38
End: 2023-09-07
Payer: COMMERCIAL

## 2023-09-07 LAB — FERRITIN (UG/LL) IN SER/PLAS: 16 UG/L (ref 8–150)

## 2023-09-07 NOTE — TELEPHONE ENCOUNTER
Vincent Travis, DO  [id number] [place] [id number] Clinical Support Staff  All of her labs look fine,, I am not concerned about a 112 sugar,, make sure she is not taking extra iron,, at this point she does not need it, unless she is eating no meat at all, she is eating no meat then take it at least 3 days/week

## 2023-10-11 DIAGNOSIS — F32.A DEPRESSION, UNSPECIFIED DEPRESSION TYPE: ICD-10-CM

## 2023-10-11 NOTE — TELEPHONE ENCOUNTER
LOV: 3/27/2023  Pt needs appt for further refills  Please ask if a short supply is needed  Thank you!

## 2023-10-30 RX ORDER — ESCITALOPRAM OXALATE 10 MG/1
TABLET ORAL
Qty: 90 TABLET | Refills: 3 | OUTPATIENT
Start: 2023-10-30

## 2023-10-30 NOTE — TELEPHONE ENCOUNTER
Called patient to set up an appointment for refills for this med. She states she is going to see a mental health provider for this med, she has an appointment with one tomorrow  Does not need this rx

## 2023-11-16 PROBLEM — F41.9 ANXIETY: Status: ACTIVE | Noted: 2018-07-18

## 2023-11-16 NOTE — PROGRESS NOTES
Subjective   Patient ID: Cassia Martin is a 38 y.o. female who presents for No chief complaint on file..  HPI  Pt is being seen for right knee  Ongoing for  Pt describe pain as   Pt rate pain at    Pt states swollen and can’t squat or bend down   Pt is or is not taking    Review of systems  ; Patient seen today for exam denies any problems with headaches or vision, denies any shortness of breath chest pain nausea or vomiting, no black stool no blood in the stool no heartburn type symptoms denies any problems with constipation or diarrhea, and no dysuria-type symptoms    The patient's allergies medications were reviewed with them today    The patient's social family and surgical history or also reviewed here today, along with her past medical history.     Objective     Alert and active in  no acute distress  HEENT TMs clear oropharynx negative nares clear no drainage noted neck supple  With no adenopathy   Heart regular rate and rhythm without murmur and no carotid bruits  Lungs- clear to auscultation bilaterally, no wheeze or rhonchi noted  Thyroid -negative masses or nodularity  Abdomen- soft times four quadrants , bowel sounds positive no masses or organomegaly, negative tenderness guarding or rebound  Neurological exam unremarkable- DTRs in upper and lower extremities within normal limits.   skin -no lesions noted      There were no vitals taken for this visit.    Allergies   Allergen Reactions    Cefdinir Diarrhea and Hives    Cyclosporine Hives    Latex Rash    Quinolones Rash    Sulfa (Sulfonamide Antibiotics) Hives and Rash       Assessment/Plan   Problem List Items Addressed This Visit    None        If anything worsens or changes please call us at once, follow up in the office as planned,

## 2023-11-23 ASSESSMENT — ENCOUNTER SYMPTOMS
LOSS OF MOTION: 1
INABILITY TO BEAR WEIGHT: 1
MUSCLE WEAKNESS: 1
LOSS OF SENSATION: 0
INABILITY TO BEAR WEIGHT: 1
TINGLING: 0
MUSCLE WEAKNESS: 1
LOSS OF SENSATION: 0
LOSS OF MOTION: 1
TINGLING: 0

## 2023-11-24 ENCOUNTER — APPOINTMENT (OUTPATIENT)
Dept: PRIMARY CARE | Facility: CLINIC | Age: 38
End: 2023-11-24
Payer: COMMERCIAL

## 2023-11-30 NOTE — PROGRESS NOTES
Subjective   Patient ID: Cassia Martin is a 38 y.o. female who presents for No chief complaint on file..  HPI  Patient presents today complaining of right knee pain, and swelling x ***. *** injury. Has limited ROM. States this is ***. Has tried ***.      Has no other new problem /question.    Review of systems  ; Patient seen today for exam denies any problems with headaches or vision, denies any shortness of breath chest pain nausea or vomiting, no black stool no blood in the stool no heartburn type symptoms denies any problems with constipation or diarrhea, and no dysuria-type symptoms    The patient's allergies medications were reviewed with them today    The patient's social family and surgical history or also reviewed here today, along with her past medical history.     Objective     Alert and active in  no acute distress  HEENT TMs clear oropharynx negative nares clear no drainage noted neck supple  With no adenopathy   Heart regular rate and rhythm without murmur and no carotid bruits  Lungs- clear to auscultation bilaterally, no wheeze or rhonchi noted  Thyroid -negative masses or nodularity  Abdomen- soft times four quadrants , bowel sounds positive no masses or organomegaly, negative tenderness guarding or rebound  Neurological exam unremarkable- DTRs in upper and lower extremities within normal limits.   skin -no lesions noted      There were no vitals taken for this visit.    Allergies   Allergen Reactions    Cefdinir Diarrhea and Hives    Cyclosporine Hives    Latex Rash    Quinolones Rash    Sulfa (Sulfonamide Antibiotics) Hives and Rash       Assessment/Plan   Problem List Items Addressed This Visit    None        If anything worsens or changes please call us at once, follow up in the office as planned,

## 2023-12-01 ENCOUNTER — APPOINTMENT (OUTPATIENT)
Dept: PRIMARY CARE | Facility: CLINIC | Age: 38
End: 2023-12-01
Payer: COMMERCIAL

## 2023-12-01 ASSESSMENT — ENCOUNTER SYMPTOMS
INABILITY TO BEAR WEIGHT: 1
LOSS OF SENSATION: 0
LOSS OF MOTION: 1
MUSCLE WEAKNESS: 1
TINGLING: 0

## 2023-12-07 NOTE — PROGRESS NOTES
"Subjective   Patient ID: Cassia Martin is a 38 y.o. female who presents for Knee Pain.    Pt is being seen for Right knee pain,   Ongoing for 2 month  Pt describe pain as stabbing  Pt rate pain at 7/10  swollen and can’t squat or bend down.   Pt is not taking any for pain OTC  Admits to hearing a click.  Walking is okay.    Flu 11/15/23    No other concern or question    Review of systems  ; Patient seen today for exam denies any problems with headaches or vision, denies any shortness of breath chest pain nausea or vomiting, no black stool no blood in the stool no heartburn type symptoms denies any problems with constipation or diarrhea, and no dysuria-type symptoms    The patient's allergies medications were reviewed with them today    The patient's social family and surgical history or also reviewed here today, along with her past medical history.     Objective     Alert and active in  no acute distress    Neurological exam unremarkable- DTRs in upper and lower extremities within normal limits.   skin -no lesions noted    Right knee positive pain lateral vastus lateralis and also posterior knee neurovascular intact otherwise small amount of edema noted neurovascular intact otherwise      /82 (BP Location: Left arm, Patient Position: Sitting, BP Cuff Size: Adult)   Pulse 69   Temp 36.4 °C (97.5 °F) (Temporal)   Resp 16   Ht 1.575 m (5' 2\")   Wt 96.9 kg (213 lb 9.6 oz)   SpO2 98%   BMI 39.07 kg/m²     Allergies   Allergen Reactions    Bupropion Hcl Dermatitis, Hives and Itching    Cefdinir Diarrhea and Hives    Cyclosporine Hives    Latex Rash    Quinolones Rash    Sulfa (Sulfonamide Antibiotics) Hives and Rash       Assessment/Plan   Problem List Items Addressed This Visit       Knee pain, right    Relevant Medications    meloxicam (Mobic) 7.5 mg tablet    Other Relevant Orders    XR knee right 3 views     Other Visit Diagnoses       BMI 39.0-39.9,adult        Class 2 obesity due to excess calories " without serious comorbidity with body mass index (BMI) of 39.0 to 39.9 in adult              Discussed patient's BMI and to institute calorie reduction and increase exercise to decrease risk of diabetes and heart disease in the future.    Biking is good for strengthening knees.  Ice following activity.    Recommend knee brace with hold in center and Velcro.    X-ray of knee pain ordered.    Mobic 7.5 mg prescribed today.    Recommend less sugar, less carbs.    If anything worsens or changes please call us at once, follow up in the office as planned.    Scribe Attestation  By signing my name below, I, Anita Salgado MA, Scribe   attest that this documentation has been prepared under the direction and in the presence of Vincent Travis DO.

## 2023-12-08 ENCOUNTER — OFFICE VISIT (OUTPATIENT)
Dept: PRIMARY CARE | Facility: CLINIC | Age: 38
End: 2023-12-08
Payer: COMMERCIAL

## 2023-12-08 VITALS
DIASTOLIC BLOOD PRESSURE: 82 MMHG | RESPIRATION RATE: 16 BRPM | SYSTOLIC BLOOD PRESSURE: 110 MMHG | OXYGEN SATURATION: 98 % | HEART RATE: 69 BPM | HEIGHT: 62 IN | WEIGHT: 213.6 LBS | BODY MASS INDEX: 39.31 KG/M2 | TEMPERATURE: 97.5 F

## 2023-12-08 DIAGNOSIS — E66.09 CLASS 2 OBESITY DUE TO EXCESS CALORIES WITHOUT SERIOUS COMORBIDITY WITH BODY MASS INDEX (BMI) OF 39.0 TO 39.9 IN ADULT: ICD-10-CM

## 2023-12-08 DIAGNOSIS — M25.561 RIGHT KNEE PAIN, UNSPECIFIED CHRONICITY: ICD-10-CM

## 2023-12-08 PROCEDURE — 1036F TOBACCO NON-USER: CPT | Performed by: FAMILY MEDICINE

## 2023-12-08 PROCEDURE — 99213 OFFICE O/P EST LOW 20 MIN: CPT | Performed by: FAMILY MEDICINE

## 2023-12-08 PROCEDURE — 3008F BODY MASS INDEX DOCD: CPT | Performed by: FAMILY MEDICINE

## 2023-12-08 RX ORDER — MELOXICAM 7.5 MG/1
7.5 TABLET ORAL DAILY
Qty: 30 TABLET | Refills: 2 | Status: SHIPPED | OUTPATIENT
Start: 2023-12-08 | End: 2024-02-15 | Stop reason: HOSPADM

## 2023-12-16 ENCOUNTER — ANCILLARY PROCEDURE (OUTPATIENT)
Dept: RADIOLOGY | Facility: CLINIC | Age: 38
End: 2023-12-16
Payer: COMMERCIAL

## 2023-12-16 DIAGNOSIS — M25.561 RIGHT KNEE PAIN, UNSPECIFIED CHRONICITY: ICD-10-CM

## 2023-12-16 PROCEDURE — 73562 X-RAY EXAM OF KNEE 3: CPT | Mod: RT

## 2023-12-16 PROCEDURE — 73562 X-RAY EXAM OF KNEE 3: CPT | Mod: RIGHT SIDE | Performed by: RADIOLOGY

## 2023-12-18 ENCOUNTER — TELEPHONE (OUTPATIENT)
Dept: PRIMARY CARE | Facility: CLINIC | Age: 38
End: 2023-12-18
Payer: COMMERCIAL

## 2023-12-18 DIAGNOSIS — M25.561 RIGHT KNEE PAIN, UNSPECIFIED CHRONICITY: ICD-10-CM

## 2023-12-18 NOTE — TELEPHONE ENCOUNTER
----- Message from Vincent Travis DO sent at 12/18/2023  9:14 AM EST -----  She does have more arthritis than I thought for a young lady,, I would refer her to Dr. Frazier for possible injection based discuss steroids vase hyaluronic gels,, if she cannot get in and wants a cortisone injection we can always do that here in the office

## 2023-12-18 NOTE — TELEPHONE ENCOUNTER
SPOKE WITH PATIENT - SHE WOULD LIKE A [employer] MESSAGE WITH DR. YOUNGER'S PHONE NUMBER IN IT.     My chart message sent.

## 2023-12-26 ENCOUNTER — OFFICE VISIT (OUTPATIENT)
Dept: ORTHOPEDIC SURGERY | Facility: CLINIC | Age: 38
End: 2023-12-26
Payer: COMMERCIAL

## 2023-12-26 VITALS — BODY MASS INDEX: 38.09 KG/M2 | HEIGHT: 62 IN | WEIGHT: 207 LBS

## 2023-12-26 DIAGNOSIS — G89.29 CHRONIC PAIN OF RIGHT KNEE: ICD-10-CM

## 2023-12-26 DIAGNOSIS — M25.561 CHRONIC PAIN OF RIGHT KNEE: ICD-10-CM

## 2023-12-26 PROCEDURE — 3008F BODY MASS INDEX DOCD: CPT | Performed by: ORTHOPAEDIC SURGERY

## 2023-12-26 PROCEDURE — 99203 OFFICE O/P NEW LOW 30 MIN: CPT | Performed by: ORTHOPAEDIC SURGERY

## 2023-12-26 PROCEDURE — 99213 OFFICE O/P EST LOW 20 MIN: CPT | Performed by: ORTHOPAEDIC SURGERY

## 2023-12-26 PROCEDURE — 1036F TOBACCO NON-USER: CPT | Performed by: ORTHOPAEDIC SURGERY

## 2023-12-26 PROCEDURE — 2500000005 HC RX 250 GENERAL PHARMACY W/O HCPCS: Performed by: ORTHOPAEDIC SURGERY

## 2023-12-26 PROCEDURE — 20610 DRAIN/INJ JOINT/BURSA W/O US: CPT | Performed by: ORTHOPAEDIC SURGERY

## 2023-12-26 PROCEDURE — 2500000004 HC RX 250 GENERAL PHARMACY W/ HCPCS (ALT 636 FOR OP/ED): Performed by: ORTHOPAEDIC SURGERY

## 2023-12-26 RX ORDER — TRIAMCINOLONE ACETONIDE 40 MG/ML
40 INJECTION, SUSPENSION INTRA-ARTICULAR; INTRAMUSCULAR
Status: COMPLETED | OUTPATIENT
Start: 2023-12-26 | End: 2023-12-26

## 2023-12-26 RX ORDER — LIDOCAINE HYDROCHLORIDE 10 MG/ML
2 INJECTION INFILTRATION; PERINEURAL
Status: COMPLETED | OUTPATIENT
Start: 2023-12-26 | End: 2023-12-26

## 2023-12-26 RX ADMIN — TRIAMCINOLONE ACETONIDE 40 MG: 40 INJECTION, SUSPENSION INTRA-ARTICULAR; INTRAMUSCULAR at 14:31

## 2023-12-26 RX ADMIN — LIDOCAINE HYDROCHLORIDE 2 ML: 10 INJECTION, SOLUTION INFILTRATION; PERINEURAL at 14:31

## 2023-12-26 NOTE — PROGRESS NOTES
History of Present Illness   The patient presents today endorsing right knee pain.  The pain is diffuse does have some anterior and posterior pain the patient denies any recent trauma and notes the insidious onset of pain.  The pain is achy, constant, worse with activity and better with rest. The patient notes occasional locking, catching and giving out.  The patient has tried the following modalities physical therapy at home as well as Mobic.    She used to run marathons has recently tried to increase her running but the knee does limit her.  She works from home.    Past Medical History:   Diagnosis Date    Acute maxillary sinusitis, unspecified 06/01/2019    Acute maxillary sinusitis    ADHD (attention deficit hyperactivity disorder) 10/31/23    Allergic     Anxiety     Body mass index (BMI) 37.0-37.9, adult 02/23/2022    BMI 37.0-37.9, adult    Body mass index (BMI)40.0-44.9, adult 09/19/2022    BMI 40.0-44.9, adult    Contact with and (suspected) exposure to covid-19 10/31/2020    Encounter for laboratory testing for COVID-19 virus    COVID-19 11/05/2020    COVID-19 virus detected    Depression     Disorder of the skin and subcutaneous tissue, unspecified 02/14/2020    Skin lesion    Eating disorder     Morbid (severe) obesity due to excess calories (CMS/HCC) 09/19/2022    Class 3 severe obesity due to excess calories without serious comorbidity with body mass index (BMI) of 40.0 to 44.9 in adult    Other conditions influencing health status 10/31/2020    History of cough    Other obesity due to excess calories 02/23/2022    Class 2 obesity due to excess calories without serious comorbidity with body mass index (BMI) of 37.0 to 37.9 in adult    Personal history of other drug therapy 11/07/2019    History of influenza vaccination    Personal history of other specified conditions 12/07/2020    History of headache     Past Surgical History:   Procedure Laterality Date    EYE SURGERY  2019    OTHER SURGICAL  HISTORY  07/24/2019    No history of surgery    WISDOM TOOTH EXTRACTION         Current Outpatient Medications:     meloxicam (Mobic) 7.5 mg tablet, Take 1 tablet (7.5 mg) by mouth once daily., Disp: 30 tablet, Rfl: 2    ondansetron (Zofran) 4 mg tablet, Take 1 tablet (4 mg) by mouth every 8 hours if needed for nausea or vomiting., Disp: , Rfl:     Review of Systems   GENERAL: Negative for malaise, significant weight loss, fever  MUSCULOSKELETAL: See HPI  NEURO:  Negative for numbness / tingling     Physical Examination:  Right Knee:  Skin healthy and intact  No gross swelling or ecchymosis  No significant varus or valgus malalignment  Effusion:  Mild    Range of motion: full  No pain with internal rotation of the hip  Tenderness to palpation:  lateral joint line     No laxity to valgus stress  No laxity to varus stress  Negative Lachman´s test  Negative anterior drawer test  Negative posterior drawer test  Positive Trudy´s test  Neurovascular exam normal distally    Imaging:  3 views knee demonstrating some mild arthritis limited views the patellofemoral joint    Assessment:    Patient with right knee pain concern for lateral meniscal pathology versus patellofemoral wear possible small Baker's cyst    Plan:  We discussed with the patient concern for a meniscal tear.   We reviewed the natural history of meniscal pathology and discussed the implications for the health of the joint.  Various treatment options were discussed and the patient elected for corticosteroid injection today, physical therapy, MRI if she fails to improve.    L Inj/Asp: R knee on 12/26/2023 2:31 PM  Indications: pain  Details: 22 G needle, anteromedial approach  Medications: 2 mL lidocaine 10 mg/mL (1 %); 40 mg triamcinolone acetonide 40 mg/mL  Outcome: tolerated well, no immediate complications  Procedure, treatment alternatives, risks and benefits explained, specific risks discussed. Consent was given by the patient. Immediately prior to  procedure a time out was called to verify the correct patient, procedure, equipment, support staff and site/side marked as required. Patient was prepped and draped in the usual sterile fashion.

## 2024-01-09 DIAGNOSIS — G89.29 CHRONIC PAIN OF RIGHT KNEE: ICD-10-CM

## 2024-01-09 DIAGNOSIS — M25.561 CHRONIC PAIN OF RIGHT KNEE: ICD-10-CM

## 2024-01-23 ENCOUNTER — EVALUATION (OUTPATIENT)
Dept: PHYSICAL THERAPY | Facility: CLINIC | Age: 39
End: 2024-01-23
Payer: COMMERCIAL

## 2024-01-23 DIAGNOSIS — G89.29 CHRONIC PAIN OF RIGHT KNEE: ICD-10-CM

## 2024-01-23 DIAGNOSIS — M25.561 CHRONIC PAIN OF RIGHT KNEE: ICD-10-CM

## 2024-01-23 PROCEDURE — 97161 PT EVAL LOW COMPLEX 20 MIN: CPT | Mod: GP | Performed by: PHYSICAL THERAPIST

## 2024-01-23 PROCEDURE — 97110 THERAPEUTIC EXERCISES: CPT | Mod: GP | Performed by: PHYSICAL THERAPIST

## 2024-01-23 ASSESSMENT — ENCOUNTER SYMPTOMS
OCCASIONAL FEELINGS OF UNSTEADINESS: 0
LOSS OF SENSATION IN FEET: 0
DEPRESSION: 1

## 2024-01-23 ASSESSMENT — PAIN SCALES - GENERAL: PAINLEVEL_OUTOF10: 1

## 2024-01-23 ASSESSMENT — PAIN DESCRIPTION - DESCRIPTORS: DESCRIPTORS: TIGHTNESS

## 2024-01-23 ASSESSMENT — PAIN - FUNCTIONAL ASSESSMENT: PAIN_FUNCTIONAL_ASSESSMENT: 0-10

## 2024-01-23 NOTE — PATIENT INSTRUCTIONS
Access Code: [email or web address]  URL: [email or web address]  Date: 01/23/2024  Prepared by: Kyle Yepez    Exercises  - Supine Bridge  - 1 x daily - 3-4 x weekly - 2 sets - 10 reps - 5-10 hold  - Supine Straight Leg Raise  - 1 x daily - 3-4 x weekly - 2 sets - 10 reps - 5-10 hold  - Standing Terminal Knee Extension with Resistance  - 1 x daily - 3-4 x weekly - 2 sets - 10 reps - 5-10 hold  - Lateral Step Up  - 1 x daily - 3-4 x weekly - 2 sets - 10 reps - 5-10 hold

## 2024-01-23 NOTE — PROGRESS NOTES
PT Initial Evaluation    Patient Name:  Cassia Martin    MRN:  [id number]    :  1985    Today's Date:  24    Informed Consent  Patient has been informed of all evaluation findings and treatment plans and agrees to participate in Physical Therapy services and plans as outlined.    Diagnosis:  Diagnosis and Precautions: Diagnosis  M25.561,G89.29 (ICD-10-CM) - Chronic pain of right knee    Goals: Patient to have R knee MRI this coming  and will be going over MRI results with MD next week.  Patient placed on hold until after going over MRI results with MD    Plan of Care:      Treatment/Interventions: Aquatic therapy, Cryotherapy, Education/ Instruction, Electrical stimulation, Gait training, Manual therapy, Neuromuscular re-education, Self care/ home management, Taping techniques, Therapeutic activities, Therapeutic exercises, Ultrasound, Vasopneumatic device  PT Plan:  (Patient to have MRI this Thursday and will be going over results with MD next week)  PT Frequency:  (Patient placed on hold until after going over MRI results with MD)  Duration: Patient placed on hold until after going over MRI results with MD     Certification Period Start Date: 24  Certification Period End Date: 24  Number of Treatments Authorized: 1  Rehab Potential: Good  Plan of Care Agreement: Patient    PT Assessment:    Patient is a 38 y.o. FEMALE with c/o R knee pain.   Patient is alert and oriented x 3.  Patient presents with medical diagnosis of chronic R knee pain contributing to compensatory soft tissue dysfunction, pain, stiffness and weakness of the R knee.   Significant past medical history/past surgical history includes see above.    Skilled care is needed to progress the patient back to these activities without exacerbating symptoms.   Patient requires skilled PT services to address the problems identified and the individualized patient's goals as outlined in the problems and goals section  of this evaluation.  A skilled PT is required to address these key impairments and to provide and progress with an appropriate home exercise program. Patient does not have any significant PMH influencing Rx and reports motivation to return to FUNCTIONAL ACTIVITY.   Patient demonstrates to be a good candidate for physical therapy with good rehab potential and verbalized a good understanding of HER diagnosis, prognosis and treatment.  Patient to have R knee MRI this coming Thursday afternoon and will be going over MRI results with MD next week (patient to call in and schedule visit with MD Friday morning for next week).  Patient placed on hold until after going over MRI results with MD    PT Assessment Results: Decreased strength, Decreased endurance, Pain  Rehab Prognosis: Good  Evaluation/Treatment Tolerance: Patient tolerated treatment well    Complexity:  Low complexity evaluation  due to a 15  minute duration, a past medical history WITHOUT any personal factors and/or comorbidities that could impact the POC, examination of body systems completed on one to two elements, the patient presents with a stable condition, and clinical decision making using the LEFS was of low complexity.     Prognosis:  Rehab Prognosis: Good    Problem List  Activity Limitations, Decreased Functional Level, Decreased knowledge of HEP, Gait issues, Pain, Strength, and Endurance    Impairments   IMPAIRED STRENGTH LOWER BODY, IMPAIRED GAIT, INCREASED PAIN, IMPAIRED FUNCTIONAL ACTIVITY LEVEL, and IMPAIRED FUNCTIONAL MOVEMENT PATTERNS    Functional Limitations:  LIMITATIONS PERFORMING BASIC ADL'S, PARTICIPATION IN HOBBIES, PARTICIPATION IN LEISURE ACTIVITIES, and PARTICIPATION IN HOME MANAGEMENT    General Visit Information:  Reason for Referral: PT Evaluate and Treat  Referred By: Dr. Jeronimo Frazier  General Comment: Diagnosis  M25.561,G89.29 (ICD-10-CM) - Chronic pain of right knee    Pre-Cautions:  MONISHAADI Fall Risk Score (The score of 4 or  more indicates an increased risk of falling): 6  LE Weight Bearing Status: Weight Bearing as Tolerated  Medical Precautions:  (ADHD, Depression, Anxiety, headaches, migraines, no surgeries))     Reason for Visit:  PT Evaluate and Treat    Initial Evaluation:  Referred By: Dr. Jeronimo Frazier    Insurance  Insurance reviewed  Name of Insurance:  [employer]  Visit No.  1  * (EVAL) CHRONIC PAIN RIGHT KNEE; $60 COPAY 0% COINSUR 2000 DED 4000 OOP PA IS NOT REQ PER PROVIDER.[email or web address] [id number] // Ana Maria confirmed 1/19/24 3:35pm     Subjective:    Current Episode  Date of Onset:  1 year ago  Mechanism of injury:  Patient reports having R knee pain for over 1 year and is unsure exactly how she hurt her knee (may have been from running).    Pain Score:  Pain Assessment: 0-10  Pain Assessment  Pain Assessment: 0-10  Pain Score: 1  Pain Type: Chronic pain  Pain Location: Knee  Pain Orientation: Right, Anterior, Posterior  Pain Descriptors: Tightness  Pain Frequency: Intermittent  Pain Type: Chronic pain  Pain Location: Knee  Pain Orientation: Right, Anterior, Posterior  Pain Descriptors: Tightness  Pain Frequency: Intermittent    Better with:  cortisone shot for 3 days    Worse with:  running, + locking, + giving out, kneeling, sit to stand, squatting, lunging, walking, stairs    Medical History/Surgical History:  Medical Precautions:  (ADHD, Depression, Anxiety, headaches, migraines, no surgeries)    Reviewed medical history form with patient (medications/allergies reviewed with patient).  Current Outpatient Medications   Medication Instructions    meloxicam (MOBIC) 7.5 mg, oral, Daily    ondansetron (ZOFRAN) 4 mg, oral, Every 8 hours PRN     Radiology:  Study Result    Narrative & Impression   Interpreted By:  Jules Burden,   STUDY:  XR KNEE RIGHT 3 VIEWS;  12/16/2023 9:53 am      INDICATION:  Signs/Symptoms:pain.      COMPARISON:  none      ACCESSION NUMBER(S):  [id number]      ORDERING CLINICIAN:  ISABEL  CARROCCIO      FINDINGS:  Three views right knee      Moderate tricompartment productive degenerative changes. No fracture  or dislocation. No osseous lesion. Trace effusion. Soft tissues  otherwise intact.      IMPRESSION  Right knee Moderate tricompartment productive degenerative changes.     Functional Assessment:  Level of [place]:  Level of [place]: Independent with ADLs and functional transfers    Functional Limitations:  running, + locking, + giving out, kneeling, sit to stand, squatting, lunging, walking, stairs    Work Status:  EMPLOYED, Nurse  Patient Awareness:  Patient is aware of HER diagnosis and prognosis.  Social Support/History:  LIVES WITH FAMILY    Objective:    Weightbearing Status:  WBAT R LE    Any Pre-Cautions:  patient to have R knee MRI this Thursday 1/25/2024    Skin:  skin intact over R knee    Palpation:   no point tenderness over R knee    Sensation:  Patient denies numbness/tingling of bilateral LOWER extremities.    Gait:  Normal gait    Lower Extremity Movement Testing:  Squat-R anterior knee pain  Lunge-R anterior knee pain    ROM:  AROM  R knee extension WNL's, R knee flexion end range pain  L knee AROM WNL's, R hip AROM WNL's, L hip AROM WNL's, R ankle/foot AROM WNL's, and L ankle/foot AROM WNL's    Strength:    R knee 4/5 all planes  L knee 5/5 all planes, R hip 5/5 all planes, L hip 5/5 all planes, R ankle/foot 5/5 all planes, and L ankle/foot 5/5 all planes    Outcome measure  Lower Extremity Funtional Score (LEFS): 51/80      Treatment  Time in clinic started at  3:25pm  Time in clinic ended at  3:55pm  Total time in clinic is . 30 minutes  Total timed code time is  30 minutes    Treatment Performed Today:.   PT Initial Evaluation and Self-Care/Home Management HEP  Individual(s) Educated: Patient  Education Provided: Home Exercise Program  Diagnosis and Precautions: Diagnosis  M25.561,G89.29 (ICD-10-CM) - Chronic pain of right knee  Risk and Benefits Discussed with  Patient/Caregiver/Other: yes  Patient/Caregiver Demonstrated Understanding: yes  Plan of Care Discussed and Agreed Upon: yes  Patient Response to Education: Patient/Caregiver Verbalized Understanding of Information, Patient/Caregiver Performed Return Demonstration of Exercises/Activities    Patient instructed in a home exercise program, has been given handouts for each of the exercises performed and was given another sheet instructing patient in the amount of reps to perform and the lucía to follow while doing the exercises      Access Code: [email or web address]  URL: [email or web address]  Date: 01/23/2024  Prepared by: Kyle Yepez    Exercises  - Supine Bridge  - 1 x daily - 3-4 x weekly - 2 sets - 10 reps - 5-10 hold  - Supine Straight Leg Raise  - 1 x daily - 3-4 x weekly - 2 sets - 10 reps - 5-10 hold  - Standing Terminal Knee Extension with Resistance  - 1 x daily - 3-4 x weekly - 2 sets - 10 reps - 5-10 hold  - Lateral Step Up  - 1 x daily - 3-4 x weekly - 2 sets - 10 reps - 5-10 hold

## 2024-01-25 ENCOUNTER — HOSPITAL ENCOUNTER (OUTPATIENT)
Dept: RADIOLOGY | Facility: CLINIC | Age: 39
Discharge: HOME | End: 2024-01-25
Payer: COMMERCIAL

## 2024-01-25 DIAGNOSIS — G89.29 CHRONIC PAIN OF RIGHT KNEE: ICD-10-CM

## 2024-01-25 DIAGNOSIS — M25.561 CHRONIC PAIN OF RIGHT KNEE: ICD-10-CM

## 2024-01-25 PROCEDURE — 73721 MRI JNT OF LWR EXTRE W/O DYE: CPT | Mod: RIGHT SIDE | Performed by: STUDENT IN AN ORGANIZED HEALTH CARE EDUCATION/TRAINING PROGRAM

## 2024-01-25 PROCEDURE — 73721 MRI JNT OF LWR EXTRE W/O DYE: CPT | Mod: RT

## 2024-01-26 ENCOUNTER — TELEPHONE (OUTPATIENT)
Dept: OPERATING ROOM | Facility: HOSPITAL | Age: 39
End: 2024-01-26
Payer: COMMERCIAL

## 2024-01-26 NOTE — TELEPHONE ENCOUNTER
Contacted the patient she has evidence of a mass in the area of Hoffa's fat pad.  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  No major structural issues in the knee.  We reviewed with Dr. Amador who agrees arthroscopic excisional biopsy would be the appropriate next step.  We reviewed with the patient discussing briefly risks benefits presumptive diagnosis and postoperative plan.  We will help facilitate follow-up appointment for further discussion.

## 2024-01-31 ENCOUNTER — OFFICE VISIT (OUTPATIENT)
Dept: ORTHOPEDIC SURGERY | Facility: CLINIC | Age: 39
End: 2024-01-31
Payer: COMMERCIAL

## 2024-01-31 ENCOUNTER — DOCUMENTATION (OUTPATIENT)
Dept: ORTHOPEDIC SURGERY | Facility: CLINIC | Age: 39
End: 2024-01-31

## 2024-01-31 DIAGNOSIS — M25.561 CHRONIC PAIN OF RIGHT KNEE: Primary | ICD-10-CM

## 2024-01-31 DIAGNOSIS — G89.29 CHRONIC PAIN OF RIGHT KNEE: Primary | ICD-10-CM

## 2024-01-31 PROBLEM — R22.41 LOCALIZED SWELLING, MASS AND LUMP, RIGHT LOWER LIMB: Status: ACTIVE | Noted: 2024-01-31

## 2024-01-31 PROCEDURE — 99214 OFFICE O/P EST MOD 30 MIN: CPT | Mod: 57 | Performed by: ORTHOPAEDIC SURGERY

## 2024-01-31 PROCEDURE — 99214 OFFICE O/P EST MOD 30 MIN: CPT | Performed by: ORTHOPAEDIC SURGERY

## 2024-01-31 PROCEDURE — 3008F BODY MASS INDEX DOCD: CPT | Performed by: ORTHOPAEDIC SURGERY

## 2024-01-31 PROCEDURE — 1036F TOBACCO NON-USER: CPT | Performed by: ORTHOPAEDIC SURGERY

## 2024-01-31 ASSESSMENT — PAIN - FUNCTIONAL ASSESSMENT: PAIN_FUNCTIONAL_ASSESSMENT: 0-10

## 2024-01-31 ASSESSMENT — PAIN SCALES - GENERAL: PAINLEVEL_OUTOF10: 1

## 2024-01-31 NOTE — PROGRESS NOTES
History of Present Illness:   Patient returns today to review MRI.  The patient endorses persistent knee pain and persistent mechanical symptoms including locking, catching, and giving out.  These issues are refractory to multiple non-surgical treatments.    She does note significant improvement since she had a cortisone shot as it was significantly more swollen before that.    Review of Systems   GENERAL: Negative for malaise, significant weight loss, fever  MUSCULOSKELETAL: See HPI  NEURO:  Negative for numbness / tingling     Physical Examination:  Right Knee:  Skin healthy and intact  No gross varus or valgus alignment   Range of motion: no major deficits   Tenderness to palpation over joint line: Patellofemoral joint with some mild crepitance  No laxity to valgus stress  No laxity to varus stress  Negative Lachman´s test  Negative anterior drawer test  Negative posterior drawer test  Positive Trudy´s test  Neurovascular exam normal distally    Imaging  MRI: Mass posterior to the patella tendon consistent with PVNS, articular cartilage wear lateral facet of the patella with some mild lateral tilt    Assessment:    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  We discussed the MRI with orthopedic oncology who did feel arthroscopic resection was reasonable.  She has had recurrent effusions which we feel is likely from this lesion however she does have some anterior knee pain and some lateral facet wear of the patella.  We reviewed arthroscopy chondroplasty possible lateral release with her.    We discussed the possibility of recurrence of the PVNS we discussed the possibility of incomplete relief of pain as she does have significant chondral wear in a more localized area.  We discussed the realistic goal of returning to running for shorter distances which she is amenable to.  All of  her questions were answered.

## 2024-01-31 NOTE — H&P (VIEW-ONLY)
History Of Present Illness  Cassia Martin is a 38 y.o. female presenting with right knee pain.     Past Medical History  She has a past medical history of Acute maxillary sinusitis, unspecified (06/01/2019), ADHD (attention deficit hyperactivity disorder) (10/31/23), Allergic, Anxiety, Body mass index (BMI) 37.0-37.9, adult (02/23/2022), Body mass index (BMI)40.0-44.9, adult (09/19/2022), Contact with and (suspected) exposure to covid-19 (10/31/2020), COVID-19 (11/05/2020), Depression, Disorder of the skin and subcutaneous tissue, unspecified (02/14/2020), Eating disorder, Morbid (severe) obesity due to excess calories (CMS/[facility]) (09/19/2022), Other conditions influencing health status (10/31/2020), Other obesity due to excess calories (02/23/2022), Personal history of other drug therapy (11/07/2019), and Personal history of other specified conditions (12/07/2020).    Surgical History  She has a past surgical history that includes Other surgical history (07/24/2019); Eye surgery (2019); and [place] tooth extraction.     Social History  She reports that she has never smoked. She has never used smokeless tobacco. She reports current alcohol use of about 1.0 standard drink of alcohol per week. She reports that she does not use drugs.    Family History  Family History   Problem Relation Name Age of Onset    Asthma Father Josse     Hypertension Father Josse     Hypertension Maternal Grandmother Fatou     Mental illness Paternal Grandmother Vaibhav     Alcohol abuse Mother's Sister Diya         Allergies  Bupropion hcl, Cefdinir, Cyclosporine, Latex, Quinolones, and Sulfa (sulfonamide antibiotics)    Review of Systems   CONSTITUTIONAL: Denies weight loss, fever and chills.    - HEENT: Denies changes in vision and hearing.    - RESPIRATORY: Denies SOB and cough.    - CV: Denies palpitations and CP.    - GI: Denies abdominal pain, nausea, vomiting and diarrhea.    - : Denies dysuria and urinary frequency.    - MSK: See  physical exam findings     - SKIN: Denies rash and pruritus.    - NEUROLOGICAL: Denies headache and syncope.    - PSYCHIATRIC: Denies recent changes in mood. Denies anxiety and depression.      Physical Exam  - GENERAL: Alert and oriented x 3. No acute distress. Well-nourished.    - EYES: EOMI. Anicteric.    - HENT: Moist mucous membranes. No scleral icterus. No cervical lymphadenopathy.    - LUNGS: Clear to auscultation bilaterally. No accessory muscle use.    - CARDIOVASCULAR: Regular rate and rhythm. No murmur. No JVD.    - ABDOMEN: Soft, non-tender and non-distended. No palpable masses.    - EXTREMITIES: Positive Trudy's test    - NEUROLOGIC: No focal neurological deficits. CN II-XII grossly intact, but not individually tested.    - PSYCHIATRIC: Cooperative. Appropriate mood and affect.      Last Recorded Vitals  There were no vitals taken for this visit.    Relevant Results      Scheduled medications    Continuous medications    PRN medications    No results found for this or any previous visit (from the past 24 hour(s)).    Assessment/Plan   Diagnoses and all orders for this visit:  Chronic pain of right knee    Discussed right knee arthroscopy with mass excision possible lateral retinacular release.     We discussed clindamycin for antibiotic coverage for surgical prophylaxis as she does have a documented allergy to oral cephalosporins, however she said she was agreeable to IV Ancef.       I spent 10 minutes in the professional and overall care of this patient.      Miles Figueroa PA-C

## 2024-01-31 NOTE — H&P
History Of Present Illness  Cassia Martin is a 38 y.o. female presenting with right knee pain.     Past Medical History  She has a past medical history of Acute maxillary sinusitis, unspecified (06/01/2019), ADHD (attention deficit hyperactivity disorder) (10/31/23), Allergic, Anxiety, Body mass index (BMI) 37.0-37.9, adult (02/23/2022), Body mass index (BMI)40.0-44.9, adult (09/19/2022), Contact with and (suspected) exposure to covid-19 (10/31/2020), COVID-19 (11/05/2020), Depression, Disorder of the skin and subcutaneous tissue, unspecified (02/14/2020), Eating disorder, Morbid (severe) obesity due to excess calories (CMS/[facility]) (09/19/2022), Other conditions influencing health status (10/31/2020), Other obesity due to excess calories (02/23/2022), Personal history of other drug therapy (11/07/2019), and Personal history of other specified conditions (12/07/2020).    Surgical History  She has a past surgical history that includes Other surgical history (07/24/2019); Eye surgery (2019); and [place] tooth extraction.     Social History  She reports that she has never smoked. She has never used smokeless tobacco. She reports current alcohol use of about 1.0 standard drink of alcohol per week. She reports that she does not use drugs.    Family History  Family History   Problem Relation Name Age of Onset    Asthma Father Josse     Hypertension Father Josse     Hypertension Maternal Grandmother Fatou     Mental illness Paternal Grandmother Vaibhav     Alcohol abuse Mother's Sister Diya         Allergies  Bupropion hcl, Cefdinir, Cyclosporine, Latex, Quinolones, and Sulfa (sulfonamide antibiotics)    Review of Systems   CONSTITUTIONAL: Denies weight loss, fever and chills.    - HEENT: Denies changes in vision and hearing.    - RESPIRATORY: Denies SOB and cough.    - CV: Denies palpitations and CP.    - GI: Denies abdominal pain, nausea, vomiting and diarrhea.    - : Denies dysuria and urinary frequency.    - MSK: See  physical exam findings     - SKIN: Denies rash and pruritus.    - NEUROLOGICAL: Denies headache and syncope.    - PSYCHIATRIC: Denies recent changes in mood. Denies anxiety and depression.      Physical Exam  - GENERAL: Alert and oriented x 3. No acute distress. Well-nourished.    - EYES: EOMI. Anicteric.    - HENT: Moist mucous membranes. No scleral icterus. No cervical lymphadenopathy.    - LUNGS: Clear to auscultation bilaterally. No accessory muscle use.    - CARDIOVASCULAR: Regular rate and rhythm. No murmur. No JVD.    - ABDOMEN: Soft, non-tender and non-distended. No palpable masses.    - EXTREMITIES: Positive Trudy's test    - NEUROLOGIC: No focal neurological deficits. CN II-XII grossly intact, but not individually tested.    - PSYCHIATRIC: Cooperative. Appropriate mood and affect.      Last Recorded Vitals  There were no vitals taken for this visit.    Relevant Results      Scheduled medications    Continuous medications    PRN medications    No results found for this or any previous visit (from the past 24 hour(s)).    Assessment/Plan   Diagnoses and all orders for this visit:  Chronic pain of right knee    Discussed right knee arthroscopy with mass excision possible lateral retinacular release.     We discussed clindamycin for antibiotic coverage for surgical prophylaxis as she does have a documented allergy to oral cephalosporins, however she said she was agreeable to IV Ancef.       I spent 10 minutes in the professional and overall care of this patient.      Miles Figueroa PA-C

## 2024-01-31 NOTE — LETTER
January 31, 2024     Patient: Cassia Martin   YOB: 1985   Date of Visit: 1/31/2024       To Whom It May Concern:    It is my medical opinion that Cassia Martin  is not able to work as of 02-15-24 through 02-25-24.  She may return to work as of 02-26-24 .    If you have any questions or concerns, please don't hesitate to call.         Sincerely,        Jeronimo Frazier MD    CC: No Recipients

## 2024-01-31 NOTE — LETTER
January 31, 2024     Patient: Cassia Martin   YOB: 1985   Date of Visit: 1/31/2024       To Whom It May Concern:    This letter is to inform you that Cassia Martin has been under my care. She has an upcoming orthopedic procedure on 2/15/24. Please excuse her from work 2/15/24 - 2/16/24. She may return to work 2/19/24.    If you have any questions or concerns, please don't hesitate to call.         Sincerely,      Jeronimo Frazier MD  Electronically signed    CC: No Recipients

## 2024-02-02 ENCOUNTER — LAB (OUTPATIENT)
Dept: LAB | Facility: LAB | Age: 39
End: 2024-02-02
Payer: COMMERCIAL

## 2024-02-02 DIAGNOSIS — Z01.818 PREOP TESTING: ICD-10-CM

## 2024-02-02 LAB
ALBUMIN SERPL BCP-MCNC: 4.1 G/DL (ref 3.4–5)
ALP SERPL-CCNC: 66 U/L (ref 33–110)
ALT SERPL W P-5'-P-CCNC: 48 U/L (ref 7–45)
ANION GAP SERPL CALC-SCNC: 9 MMOL/L (ref 10–20)
AST SERPL W P-5'-P-CCNC: 27 U/L (ref 9–39)
BASOPHILS # BLD AUTO: 0.08 X10*3/UL (ref 0–0.1)
BASOPHILS NFR BLD AUTO: 1 %
BILIRUB SERPL-MCNC: 0.3 MG/DL (ref 0–1.2)
BUN SERPL-MCNC: 11 MG/DL (ref 6–23)
CALCIUM SERPL-MCNC: 9.7 MG/DL (ref 8.6–10.3)
CHLORIDE SERPL-SCNC: 104 MMOL/L (ref 98–107)
CO2 SERPL-SCNC: 27 MMOL/L (ref 21–32)
CREAT SERPL-MCNC: 0.81 MG/DL (ref 0.5–1.05)
EGFRCR SERPLBLD CKD-EPI 2021: >90 ML/MIN/1.73M*2
EOSINOPHIL # BLD AUTO: 0.13 X10*3/UL (ref 0–0.7)
EOSINOPHIL NFR BLD AUTO: 1.6 %
ERYTHROCYTE [DISTWIDTH] IN BLOOD BY AUTOMATED COUNT: 15.2 % (ref 11.5–14.5)
GLUCOSE SERPL-MCNC: 107 MG/DL (ref 74–99)
HCT VFR BLD AUTO: 41.9 % (ref 36–46)
HGB BLD-MCNC: 13.5 G/DL (ref 12–16)
IMM GRANULOCYTES # BLD AUTO: 0.02 X10*3/UL (ref 0–0.7)
IMM GRANULOCYTES NFR BLD AUTO: 0.2 % (ref 0–0.9)
LYMPHOCYTES # BLD AUTO: 2.77 X10*3/UL (ref 1.2–4.8)
LYMPHOCYTES NFR BLD AUTO: 34.4 %
MCH RBC QN AUTO: 27.7 PG (ref 26–34)
MCHC RBC AUTO-ENTMCNC: 32.2 G/DL (ref 32–36)
MCV RBC AUTO: 86 FL (ref 80–100)
MONOCYTES # BLD AUTO: 0.62 X10*3/UL (ref 0.1–1)
MONOCYTES NFR BLD AUTO: 7.7 %
NEUTROPHILS # BLD AUTO: 4.43 X10*3/UL (ref 1.2–7.7)
NEUTROPHILS NFR BLD AUTO: 55.1 %
NRBC BLD-RTO: 0 /100 WBCS (ref 0–0)
PLATELET # BLD AUTO: 317 X10*3/UL (ref 150–450)
POTASSIUM SERPL-SCNC: 4.6 MMOL/L (ref 3.5–5.3)
PROT SERPL-MCNC: 6.9 G/DL (ref 6.4–8.2)
RBC # BLD AUTO: 4.88 X10*6/UL (ref 4–5.2)
SODIUM SERPL-SCNC: 135 MMOL/L (ref 136–145)
WBC # BLD AUTO: 8.1 X10*3/UL (ref 4.4–11.3)

## 2024-02-02 PROCEDURE — 36415 COLL VENOUS BLD VENIPUNCTURE: CPT

## 2024-02-02 PROCEDURE — 80053 COMPREHEN METABOLIC PANEL: CPT

## 2024-02-02 PROCEDURE — 85025 COMPLETE CBC W/AUTO DIFF WBC: CPT

## 2024-02-14 RX ORDER — SODIUM CHLORIDE, SODIUM LACTATE, POTASSIUM CHLORIDE, CALCIUM CHLORIDE 600; 310; 30; 20 MG/100ML; MG/100ML; MG/100ML; MG/100ML
100 INJECTION, SOLUTION INTRAVENOUS CONTINUOUS
Status: CANCELLED | OUTPATIENT
Start: 2024-02-15

## 2024-02-15 ENCOUNTER — ANESTHESIA EVENT (OUTPATIENT)
Dept: OPERATING ROOM | Facility: HOSPITAL | Age: 39
End: 2024-02-15
Payer: COMMERCIAL

## 2024-02-15 ENCOUNTER — APPOINTMENT (OUTPATIENT)
Dept: ALLERGY | Facility: CLINIC | Age: 39
End: 2024-02-15
Payer: COMMERCIAL

## 2024-02-15 ENCOUNTER — ANESTHESIA (OUTPATIENT)
Dept: OPERATING ROOM | Facility: HOSPITAL | Age: 39
End: 2024-02-15
Payer: COMMERCIAL

## 2024-02-15 ENCOUNTER — HOSPITAL ENCOUNTER (OUTPATIENT)
Facility: HOSPITAL | Age: 39
Setting detail: OUTPATIENT SURGERY
Discharge: HOME | End: 2024-02-15
Attending: ORTHOPAEDIC SURGERY | Admitting: ORTHOPAEDIC SURGERY
Payer: COMMERCIAL

## 2024-02-15 VITALS
WEIGHT: 202 LBS | RESPIRATION RATE: 18 BRPM | DIASTOLIC BLOOD PRESSURE: 73 MMHG | HEIGHT: 62 IN | HEART RATE: 60 BPM | OXYGEN SATURATION: 97 % | SYSTOLIC BLOOD PRESSURE: 120 MMHG | BODY MASS INDEX: 37.17 KG/M2 | TEMPERATURE: 97.9 F

## 2024-02-15 DIAGNOSIS — R22.41 LOCALIZED SWELLING, MASS AND LUMP, RIGHT LOWER LIMB: ICD-10-CM

## 2024-02-15 DIAGNOSIS — G89.18 POST-OPERATIVE PAIN: Primary | ICD-10-CM

## 2024-02-15 LAB — HCG UR QL IA.RAPID: NEGATIVE

## 2024-02-15 PROCEDURE — 7100000002 HC RECOVERY ROOM TIME - EACH INCREMENTAL 1 MINUTE: Performed by: ORTHOPAEDIC SURGERY

## 2024-02-15 PROCEDURE — 3700000002 HC GENERAL ANESTHESIA TIME - EACH INCREMENTAL 1 MINUTE: Performed by: ORTHOPAEDIC SURGERY

## 2024-02-15 PROCEDURE — 3600000004 HC OR TIME - INITIAL BASE CHARGE - PROCEDURE LEVEL FOUR: Performed by: ORTHOPAEDIC SURGERY

## 2024-02-15 PROCEDURE — 2500000005 HC RX 250 GENERAL PHARMACY W/O HCPCS: Performed by: ANESTHESIOLOGIST ASSISTANT

## 2024-02-15 PROCEDURE — 88304 TISSUE EXAM BY PATHOLOGIST: CPT | Performed by: PATHOLOGY

## 2024-02-15 PROCEDURE — 7100000009 HC PHASE TWO TIME - INITIAL BASE CHARGE: Performed by: ORTHOPAEDIC SURGERY

## 2024-02-15 PROCEDURE — 7100000001 HC RECOVERY ROOM TIME - INITIAL BASE CHARGE: Performed by: ORTHOPAEDIC SURGERY

## 2024-02-15 PROCEDURE — 3600000009 HC OR TIME - EACH INCREMENTAL 1 MINUTE - PROCEDURE LEVEL FOUR: Performed by: ORTHOPAEDIC SURGERY

## 2024-02-15 PROCEDURE — 29877 ARTHRS KNEE SURG DBRDMT/SHVG: CPT | Performed by: ORTHOPAEDIC SURGERY

## 2024-02-15 PROCEDURE — 2500000005 HC RX 250 GENERAL PHARMACY W/O HCPCS: Performed by: STUDENT IN AN ORGANIZED HEALTH CARE EDUCATION/TRAINING PROGRAM

## 2024-02-15 PROCEDURE — 7100000010 HC PHASE TWO TIME - EACH INCREMENTAL 1 MINUTE: Performed by: ORTHOPAEDIC SURGERY

## 2024-02-15 PROCEDURE — 81025 URINE PREGNANCY TEST: CPT | Performed by: ORTHOPAEDIC SURGERY

## 2024-02-15 PROCEDURE — 2500000005 HC RX 250 GENERAL PHARMACY W/O HCPCS: Performed by: ORTHOPAEDIC SURGERY

## 2024-02-15 PROCEDURE — 2500000004 HC RX 250 GENERAL PHARMACY W/ HCPCS (ALT 636 FOR OP/ED): Performed by: ANESTHESIOLOGIST ASSISTANT

## 2024-02-15 PROCEDURE — A29877 PR KNEE SCOPE,SHAVE ARTICULAR CART: Performed by: STUDENT IN AN ORGANIZED HEALTH CARE EDUCATION/TRAINING PROGRAM

## 2024-02-15 PROCEDURE — A29877 PR KNEE SCOPE,SHAVE ARTICULAR CART: Performed by: ANESTHESIOLOGIST ASSISTANT

## 2024-02-15 PROCEDURE — 0752T DGTZ GLS MCRSCP SLD LVL III: CPT | Mod: TC,SUR,STJLAB | Performed by: ORTHOPAEDIC SURGERY

## 2024-02-15 PROCEDURE — 2500000004 HC RX 250 GENERAL PHARMACY W/ HCPCS (ALT 636 FOR OP/ED): Performed by: STUDENT IN AN ORGANIZED HEALTH CARE EDUCATION/TRAINING PROGRAM

## 2024-02-15 PROCEDURE — 3700000001 HC GENERAL ANESTHESIA TIME - INITIAL BASE CHARGE: Performed by: ORTHOPAEDIC SURGERY

## 2024-02-15 PROCEDURE — 2500000001 HC RX 250 WO HCPCS SELF ADMINISTERED DRUGS (ALT 637 FOR MEDICARE OP): Performed by: STUDENT IN AN ORGANIZED HEALTH CARE EDUCATION/TRAINING PROGRAM

## 2024-02-15 PROCEDURE — 2720000007 HC OR 272 NO HCPCS: Performed by: ORTHOPAEDIC SURGERY

## 2024-02-15 RX ORDER — FENTANYL CITRATE 50 UG/ML
INJECTION, SOLUTION INTRAMUSCULAR; INTRAVENOUS AS NEEDED
Status: DISCONTINUED | OUTPATIENT
Start: 2024-02-15 | End: 2024-02-15

## 2024-02-15 RX ORDER — PROPOFOL 10 MG/ML
INJECTION, EMULSION INTRAVENOUS AS NEEDED
Status: DISCONTINUED | OUTPATIENT
Start: 2024-02-15 | End: 2024-02-15

## 2024-02-15 RX ORDER — ALBUTEROL SULFATE 0.83 MG/ML
2.5 SOLUTION RESPIRATORY (INHALATION) ONCE AS NEEDED
Status: DISCONTINUED | OUTPATIENT
Start: 2024-02-15 | End: 2024-02-15 | Stop reason: HOSPADM

## 2024-02-15 RX ORDER — ONDANSETRON 4 MG/1
4 TABLET, ORALLY DISINTEGRATING ORAL EVERY 8 HOURS PRN
Status: DISCONTINUED | OUTPATIENT
Start: 2024-02-15 | End: 2024-02-15 | Stop reason: HOSPADM

## 2024-02-15 RX ORDER — HYDROCODONE BITARTRATE AND ACETAMINOPHEN 5; 325 MG/1; MG/1
1 TABLET ORAL EVERY 6 HOURS PRN
Qty: 12 TABLET | Refills: 0 | Status: SHIPPED | OUTPATIENT
Start: 2024-02-15 | End: 2024-02-20

## 2024-02-15 RX ORDER — ONDANSETRON HYDROCHLORIDE 2 MG/ML
4 INJECTION, SOLUTION INTRAVENOUS EVERY 8 HOURS PRN
Status: DISCONTINUED | OUTPATIENT
Start: 2024-02-15 | End: 2024-02-15 | Stop reason: HOSPADM

## 2024-02-15 RX ORDER — SODIUM CHLORIDE, SODIUM LACTATE, POTASSIUM CHLORIDE, CALCIUM CHLORIDE 600; 310; 30; 20 MG/100ML; MG/100ML; MG/100ML; MG/100ML
100 INJECTION, SOLUTION INTRAVENOUS CONTINUOUS
Status: DISCONTINUED | OUTPATIENT
Start: 2024-02-15 | End: 2024-02-15 | Stop reason: HOSPADM

## 2024-02-15 RX ORDER — ASPIRIN 81 MG/1
81 TABLET ORAL 2 TIMES DAILY
Qty: 28 TABLET | Refills: 0 | Status: SHIPPED | OUTPATIENT
Start: 2024-02-15 | End: 2024-03-07 | Stop reason: WASHOUT

## 2024-02-15 RX ORDER — LABETALOL HYDROCHLORIDE 5 MG/ML
5 INJECTION, SOLUTION INTRAVENOUS ONCE AS NEEDED
Status: DISCONTINUED | OUTPATIENT
Start: 2024-02-15 | End: 2024-02-15 | Stop reason: HOSPADM

## 2024-02-15 RX ORDER — DEXAMETHASONE SODIUM PHOSPHATE 4 MG/ML
INJECTION, SOLUTION INTRA-ARTICULAR; INTRALESIONAL; INTRAMUSCULAR; INTRAVENOUS; SOFT TISSUE AS NEEDED
Status: DISCONTINUED | OUTPATIENT
Start: 2024-02-15 | End: 2024-02-15

## 2024-02-15 RX ORDER — HYDROMORPHONE HYDROCHLORIDE 1 MG/ML
0.5 INJECTION, SOLUTION INTRAMUSCULAR; INTRAVENOUS; SUBCUTANEOUS EVERY 5 MIN PRN
Status: DISCONTINUED | OUTPATIENT
Start: 2024-02-15 | End: 2024-02-15 | Stop reason: HOSPADM

## 2024-02-15 RX ORDER — LIDOCAINE HYDROCHLORIDE 20 MG/ML
INJECTION, SOLUTION EPIDURAL; INFILTRATION; INTRACAUDAL; PERINEURAL AS NEEDED
Status: DISCONTINUED | OUTPATIENT
Start: 2024-02-15 | End: 2024-02-15

## 2024-02-15 RX ORDER — KETOROLAC TROMETHAMINE 30 MG/ML
INJECTION, SOLUTION INTRAMUSCULAR; INTRAVENOUS AS NEEDED
Status: DISCONTINUED | OUTPATIENT
Start: 2024-02-15 | End: 2024-02-15

## 2024-02-15 RX ORDER — BUPIVACAINE HYDROCHLORIDE 5 MG/ML
INJECTION, SOLUTION PERINEURAL AS NEEDED
Status: DISCONTINUED | OUTPATIENT
Start: 2024-02-15 | End: 2024-02-15 | Stop reason: HOSPADM

## 2024-02-15 RX ORDER — ONDANSETRON HYDROCHLORIDE 2 MG/ML
INJECTION, SOLUTION INTRAVENOUS AS NEEDED
Status: DISCONTINUED | OUTPATIENT
Start: 2024-02-15 | End: 2024-02-15

## 2024-02-15 RX ORDER — LIDOCAINE HYDROCHLORIDE 10 MG/ML
0.1 INJECTION, SOLUTION EPIDURAL; INFILTRATION; INTRACAUDAL; PERINEURAL ONCE
Status: DISCONTINUED | OUTPATIENT
Start: 2024-02-15 | End: 2024-02-15 | Stop reason: HOSPADM

## 2024-02-15 RX ORDER — OXYCODONE HYDROCHLORIDE 5 MG/1
5 TABLET ORAL EVERY 4 HOURS PRN
Status: DISCONTINUED | OUTPATIENT
Start: 2024-02-15 | End: 2024-02-15 | Stop reason: HOSPADM

## 2024-02-15 RX ORDER — FENTANYL CITRATE 50 UG/ML
25 INJECTION, SOLUTION INTRAMUSCULAR; INTRAVENOUS EVERY 5 MIN PRN
Status: DISCONTINUED | OUTPATIENT
Start: 2024-02-15 | End: 2024-02-15 | Stop reason: HOSPADM

## 2024-02-15 RX ORDER — MEPERIDINE HYDROCHLORIDE 50 MG/ML
12.5 INJECTION INTRAMUSCULAR; INTRAVENOUS; SUBCUTANEOUS EVERY 10 MIN PRN
Status: DISCONTINUED | OUTPATIENT
Start: 2024-02-15 | End: 2024-02-15 | Stop reason: HOSPADM

## 2024-02-15 RX ORDER — FLUOXETINE HYDROCHLORIDE 20 MG/1
20 CAPSULE ORAL DAILY
COMMUNITY

## 2024-02-15 RX ORDER — MIDAZOLAM HYDROCHLORIDE 1 MG/ML
INJECTION, SOLUTION INTRAMUSCULAR; INTRAVENOUS AS NEEDED
Status: DISCONTINUED | OUTPATIENT
Start: 2024-02-15 | End: 2024-02-15

## 2024-02-15 RX ORDER — ONDANSETRON HYDROCHLORIDE 2 MG/ML
4 INJECTION, SOLUTION INTRAVENOUS ONCE AS NEEDED
Status: DISCONTINUED | OUTPATIENT
Start: 2024-02-15 | End: 2024-02-15 | Stop reason: HOSPADM

## 2024-02-15 RX ADMIN — FENTANYL CITRATE 75 MCG: 50 INJECTION, SOLUTION INTRAMUSCULAR; INTRAVENOUS at 07:40

## 2024-02-15 RX ADMIN — FENTANYL CITRATE 50 MCG: 50 INJECTION, SOLUTION INTRAMUSCULAR; INTRAVENOUS at 08:10

## 2024-02-15 RX ADMIN — FENTANYL CITRATE 25 MCG: 50 INJECTION, SOLUTION INTRAMUSCULAR; INTRAVENOUS at 08:21

## 2024-02-15 RX ADMIN — KETOROLAC TROMETHAMINE 30 MG: 30 INJECTION, SOLUTION INTRAMUSCULAR; INTRAVENOUS at 08:21

## 2024-02-15 RX ADMIN — SODIUM CHLORIDE, POTASSIUM CHLORIDE, SODIUM LACTATE AND CALCIUM CHLORIDE: 600; 310; 30; 20 INJECTION, SOLUTION INTRAVENOUS at 07:36

## 2024-02-15 RX ADMIN — FENTANYL CITRATE 25 MCG: 50 INJECTION, SOLUTION INTRAMUSCULAR; INTRAVENOUS at 09:14

## 2024-02-15 RX ADMIN — FENTANYL CITRATE 50 MCG: 50 INJECTION, SOLUTION INTRAMUSCULAR; INTRAVENOUS at 07:58

## 2024-02-15 RX ADMIN — ONDANSETRON 4 MG: 4 TABLET, ORALLY DISINTEGRATING ORAL at 11:46

## 2024-02-15 RX ADMIN — PROPOFOL 200 MG: 10 INJECTION, EMULSION INTRAVENOUS at 07:40

## 2024-02-15 RX ADMIN — ONDANSETRON 4 MG: 2 INJECTION INTRAMUSCULAR; INTRAVENOUS at 08:21

## 2024-02-15 RX ADMIN — MIDAZOLAM 2 MG: 1 INJECTION INTRAMUSCULAR; INTRAVENOUS at 07:35

## 2024-02-15 RX ADMIN — LIDOCAINE HYDROCHLORIDE 100 MG: 20 INJECTION, SOLUTION EPIDURAL; INFILTRATION; INTRACAUDAL; PERINEURAL at 07:40

## 2024-02-15 RX ADMIN — DEXAMETHASONE SODIUM PHOSPHATE 8 MG: 4 INJECTION, SOLUTION INTRAMUSCULAR; INTRAVENOUS at 07:40

## 2024-02-15 RX ADMIN — OXYCODONE HYDROCHLORIDE 5 MG: 5 TABLET ORAL at 10:21

## 2024-02-15 SDOH — HEALTH STABILITY: MENTAL HEALTH: CURRENT SMOKER: 0

## 2024-02-15 ASSESSMENT — PAIN - FUNCTIONAL ASSESSMENT
PAIN_FUNCTIONAL_ASSESSMENT: 0-10

## 2024-02-15 ASSESSMENT — PAIN SCALES - GENERAL
PAINLEVEL_OUTOF10: 0 - NO PAIN
PAINLEVEL_OUTOF10: 3
PAINLEVEL_OUTOF10: 4
PAINLEVEL_OUTOF10: 0 - NO PAIN
PAINLEVEL_OUTOF10: 3
PAINLEVEL_OUTOF10: 5 - MODERATE PAIN
PAINLEVEL_OUTOF10: 1
PAIN_LEVEL: 0
PAINLEVEL_OUTOF10: 6
PAINLEVEL_OUTOF10: 4

## 2024-02-15 ASSESSMENT — PAIN DESCRIPTION - DESCRIPTORS: DESCRIPTORS: ACHING

## 2024-02-15 ASSESSMENT — PAIN DESCRIPTION - LOCATION: LOCATION: KNEE

## 2024-02-15 ASSESSMENT — COLUMBIA-SUICIDE SEVERITY RATING SCALE - C-SSRS
1. IN THE PAST MONTH, HAVE YOU WISHED YOU WERE DEAD OR WISHED YOU COULD GO TO SLEEP AND NOT WAKE UP?: NO
6. HAVE YOU EVER DONE ANYTHING, STARTED TO DO ANYTHING, OR PREPARED TO DO ANYTHING TO END YOUR LIFE?: NO
2. HAVE YOU ACTUALLY HAD ANY THOUGHTS OF KILLING YOURSELF?: NO

## 2024-02-15 ASSESSMENT — PAIN DESCRIPTION - ORIENTATION: ORIENTATION: RIGHT

## 2024-02-15 NOTE — ANESTHESIA PROCEDURE NOTES
Airway  Date/Time: 2/15/2024 7:41 AM  Urgency: elective    Airway not difficult    Staffing  Performed: ENA   Authorized by: Tree Andujar DO    Performed by: ENA Guzman  Patient location during procedure: OR    Indications and Patient Condition  Indications for airway management: anesthesia  Spontaneous ventilation: present  Sedation level: deep  Preoxygenated: yes  Mask difficulty assessment: 0 - not attempted    Final Airway Details  Final airway type: supraglottic airway      Successful airway: unique  Size 4     Number of attempts at approach: 1

## 2024-02-15 NOTE — ANESTHESIA PREPROCEDURE EVALUATION
Patient: aCssia Martin    Procedure Information       Date/Time: 02/15/24 0735    Procedure: Arthroscopy Knee Mass Excision with possible Lateral Retinacular Release (Right: Knee)    Location: [facility] OR 05 / [facility] OR    Surgeons: Jeronimo Frazier MD        There were no vitals filed for this visit.    Past Surgical History:   Procedure Laterality Date    EYE SURGERY  2019    OTHER SURGICAL HISTORY  07/24/2019    No history of surgery    WISDOM TOOTH EXTRACTION       Past Medical History:   Diagnosis Date    Acute maxillary sinusitis, unspecified 06/01/2019    Acute maxillary sinusitis    ADHD (attention deficit hyperactivity disorder) 10/31/23    Allergic     Anxiety     Body mass index (BMI) 37.0-37.9, adult 02/23/2022    BMI 37.0-37.9, adult    Body mass index (BMI)40.0-44.9, adult 09/19/2022    BMI 40.0-44.9, adult    Contact with and (suspected) exposure to covid-19 10/31/2020    Encounter for laboratory testing for COVID-19 virus    COVID-19 11/05/2020    COVID-19 virus detected    Depression     Disorder of the skin and subcutaneous tissue, unspecified 02/14/2020    Skin lesion    Eating disorder     Morbid (severe) obesity due to excess calories (CMS/HCC) 09/19/2022    Class 3 severe obesity due to excess calories without serious comorbidity with body mass index (BMI) of 40.0 to 44.9 in adult    Other conditions influencing health status 10/31/2020    History of cough    Other obesity due to excess calories 02/23/2022    Class 2 obesity due to excess calories without serious comorbidity with body mass index (BMI) of 37.0 to 37.9 in adult    Personal history of other drug therapy 11/07/2019    History of influenza vaccination    Personal history of other specified conditions 12/07/2020    History of headache       Current Facility-Administered Medications:     lactated Ringer's infusion, 100 mL/hr, intravenous, Continuous, Miles Figueroa PA-C  Prior to Admission medications    Medication Sig Start Date  "End Date Taking? Authorizing Provider   FLUoxetine (PROzac) 20 mg capsule Take 1 capsule (20 mg) by mouth once daily.   Yes Historical Provider, MD   meloxicam (Mobic) 7.5 mg tablet Take 1 tablet (7.5 mg) by mouth once daily.  Patient not taking: Reported on 2/15/2024 12/8/23 12/7/24  Vincent Travis,    ondansetron (Zofran) 4 mg tablet Take 1 tablet (4 mg) by mouth every 8 hours if needed for nausea or vomiting.    Historical Provider, MD     Allergies   Allergen Reactions    Bupropion Hcl Dermatitis, Hives and Itching    Cefdinir Diarrhea and Hives    Cyclosporine Hives    Phentermine Cardiac arrhythmia/arrest    Latex Rash    Quinolones Rash    Sulfa (Sulfonamide Antibiotics) Hives and Rash     Social History     Tobacco Use    Smoking status: Never    Smokeless tobacco: Never   Substance Use Topics    Alcohol use: Yes     Alcohol/week: 1.0 standard drink of alcohol     Types: 1 Glasses of wine per week         Chemistry    Lab Results   Component Value Date/Time     (L) 02/02/2024 1339    K 4.6 02/02/2024 1339     02/02/2024 1339    CO2 27 02/02/2024 1339    BUN 11 02/02/2024 1339    CREATININE 0.81 02/02/2024 1339    Lab Results   Component Value Date/Time    CALCIUM 9.7 02/02/2024 1339    ALKPHOS 66 02/02/2024 1339    AST 27 02/02/2024 1339    ALT 48 (H) 02/02/2024 1339    BILITOT 0.3 02/02/2024 1339          Lab Results   Component Value Date/Time    WBC 8.1 02/02/2024 1339    HGB 13.5 02/02/2024 1339    HCT 41.9 02/02/2024 1339     02/02/2024 1339     No results found for: \"PROTIME\", \"PTT\", \"INR\"  No results found for this or any previous visit (from the past 4464 hour(s)).     Relevant Problems   Neuro/Psych   (+) Anxiety   (+) Depression   (+) Generalized anxiety disorder       Clinical information reviewed:   Tobacco  Allergies  Meds   Med Hx  Surg Hx  OB Status  Fam Hx  Soc   Hx        NPO Detail:  NPO/Void Status  Carbohydrate Drink Given Prior to Surgery? : N  Date of Last " Liquid: 02/14/24  Time of Last Liquid: 2300  Date of Last Solid: 02/14/24  Time of Last Solid: 1830  Last Intake Type: Clear fluids  Time of Last Void: 0645         Physical Exam    Airway  Mallampati: II     Cardiovascular - normal exam  Rhythm: regular  Rate: normal     Dental - normal exam     Pulmonary - normal exam     Abdominal - normal exam  Abdomen: soft             Anesthesia Plan    History of general anesthesia?: yes  History of complications of general anesthesia?: no    ASA 2     general     The patient is not a current smoker.  Patient was previously instructed to abstain from smoking on day of procedure.  Patient did not smoke on day of procedure.  Education provided regarding risk of obstructive sleep apnea.  intravenous induction   Postoperative administration of opioids is intended.  Anesthetic plan and risks discussed with patient.    Plan discussed with CAA and CRNA.

## 2024-02-15 NOTE — ANESTHESIA POSTPROCEDURE EVALUATION
Patient: Cassia Martin    Procedure Summary       Date: 02/15/24 Room / Location: [facility] OR 05 / [facility] OR    Anesthesia Start: 0735 Anesthesia Stop: 0835    Procedure: Arthroscopy Knee Mass Excision (Right: Knee) Diagnosis:       Localized swelling, mass and lump, right lower limb      (Localized swelling, mass and lump, right lower limb [R22.41])    Surgeons: Jeronimo Frazier MD Responsible Provider: Tree Andujar DO    Anesthesia Type: general ASA Status: 2            Anesthesia Type: general    Vitals Value Taken Time   /64 02/15/24 0834   Temp 36.4 °C (97.5 °F) 02/15/24 0834   Pulse 79 02/15/24 0834   Resp 18 02/15/24 0834   SpO2 98 % 02/15/24 0834       Anesthesia Post Evaluation    Patient location during evaluation: PACU  Patient participation: complete - patient participated  Level of consciousness: sleepy but conscious  Pain score: 0  Pain management: adequate  Airway patency: patent  Cardiovascular status: acceptable  Respiratory status: acceptable  Hydration status: acceptable  Postoperative Nausea and Vomiting: none    No notable events documented.

## 2024-02-15 NOTE — DISCHARGE INSTRUCTIONS
Post-Operative Instructions - Basic Knee Arthroscopy    Dr. Jeronimo Frazier    Activity / Swelling:  Okay to weightbear as tolerated immediately.  A brace is NOT needed. Crutches are used for balance and support but are only needed until patient feels safe ambulating.  ICE PACK to assist with pain control   Packs should not be in direct contact with your skin  Use fairly continuously until you remove the post-op dressing  After dressing removed, use for up to 20 minutes every hour as needed for pain and swelling     Diet:  Gradually resume your normal diet as tolerated following surgery.  The evening of your surgical day, begin with liquids and/or light foods.  If you are feeling well enough the next morning, progress to your normal eating patterns    Dressing care /Showering / Hygiene:  Keep operative dressing clean, dry, and intact.     Remove dressing on Post-Op Day 3    Leave the Steri-strips (small white tapes across the incisions) in place.  If no Steri-strips or they come off with dressing cover incisions with a regular / large band aid.  Do not apply lotions or ointments to incisions.  Observe the incision sites for increased redness, drainage, or foul odor.     Showering:  Once the dressing has been removed, you may shower. Incisions may be gently cleansed with mild soap. Do not scrub or rub incisions. No baths, hot-tubs, pools, or immersive soaking of any kind until sutures are removed and incisions are healed.      Medications:  Pain:  You will be given a prescription for pain medication after your surgery.  It should be taken as needed only and in many cases is NOT needed.  The goal is to discontinue it as quickly as possible.  DO NOT drive or operate heavy machinery while taking this medication as it will make you drowsy.  Do not take any additional pain medications.  This medication often continues Tylenol / acetaminophen and NO additional acetaminophen should be taken.      You may want to consider  also taking over-the-counter stool softener and/or laxative (Colace, Senekot or Dulcolax). These medications should be taken as directed and only short term.    In addition to pain medication recommend over the counter Ibuprofen 600 mg every 6-8 hours.  Take with food and avoid if any issues with stomach/GI or kidneys.  Can also add over the counter medicine (Pepcid/omeprazole) to help protect the stomach.  Do NOT take any additional anti-inflammatories.  Do not take the ibuprofen if prior bleeding issues or history of ulcers.     Prevention of Blood Clots:  81 mg of aspirin (over the counter) to start the day after surgery for 2 weeks.   Calf pumps should be done at rest.  If you have a history of blood clots you may receive a different prescription (for example: lovenox, xarelto, eliquis)     Do NOT take if prescribed other blood thinners which will be discussed prior.    Physical therapy:  Will be discussed at first follow-up appointment but can begin immediately if scheduled, if no PT is scheduled then home therapy can begin the day after surgery ~ 4 times a day for 20 min:  prop up the heel and working on gently pushing down on thigh to promote a STRAIGHT LEG.    Quad sets: with leg straight tighten quad muscles and hold for 5 seconds.  Bending can be limited by swelling but okay to bend the leg immediately and as much as tolerated.    Driving: once off narcotics, off crutches, and good leg control is achieved.  Will be discussed at first visit.    Follow-up:         Generally 10-14 days post-op      Call with any concerns, especially calf pain, signs of infection (fever, drainage) or shortness of breath.  [phone]

## 2024-02-19 DIAGNOSIS — M25.561 CHRONIC PAIN OF RIGHT KNEE: ICD-10-CM

## 2024-02-19 DIAGNOSIS — G89.29 CHRONIC PAIN OF RIGHT KNEE: ICD-10-CM

## 2024-02-19 RX ORDER — ONDANSETRON 4 MG/1
4 TABLET, FILM COATED ORAL EVERY 8 HOURS PRN
Qty: 20 TABLET | Refills: 0 | Status: SHIPPED | OUTPATIENT
Start: 2024-02-19 | End: 2024-02-26

## 2024-02-20 ENCOUNTER — TELEPHONE (OUTPATIENT)
Dept: ORTHOPEDIC SURGERY | Facility: CLINIC | Age: 39
End: 2024-02-20
Payer: COMMERCIAL

## 2024-02-20 DIAGNOSIS — G89.29 CHRONIC PAIN OF RIGHT KNEE: ICD-10-CM

## 2024-02-20 DIAGNOSIS — M25.561 CHRONIC PAIN OF RIGHT KNEE: ICD-10-CM

## 2024-02-21 ENCOUNTER — HOSPITAL ENCOUNTER (EMERGENCY)
Facility: HOSPITAL | Age: 39
Discharge: HOME | End: 2024-02-21
Attending: EMERGENCY MEDICINE
Payer: COMMERCIAL

## 2024-02-21 VITALS
HEART RATE: 86 BPM | BODY MASS INDEX: 36.62 KG/M2 | DIASTOLIC BLOOD PRESSURE: 94 MMHG | OXYGEN SATURATION: 96 % | RESPIRATION RATE: 18 BRPM | WEIGHT: 199 LBS | HEIGHT: 62 IN | TEMPERATURE: 98.6 F | SYSTOLIC BLOOD PRESSURE: 141 MMHG

## 2024-02-21 DIAGNOSIS — L76.22 POSTOPERATIVE HEMORRHAGE OF SKIN FOLLOWING NON-DERMATOLOGIC PROCEDURE: Primary | ICD-10-CM

## 2024-02-21 PROCEDURE — 99284 EMERGENCY DEPT VISIT MOD MDM: CPT | Performed by: EMERGENCY MEDICINE

## 2024-02-21 PROCEDURE — 2500000005 HC RX 250 GENERAL PHARMACY W/O HCPCS

## 2024-02-21 PROCEDURE — 99283 EMERGENCY DEPT VISIT LOW MDM: CPT

## 2024-02-21 RX ORDER — ONDANSETRON 4 MG/1
4 TABLET, ORALLY DISINTEGRATING ORAL ONCE
Status: COMPLETED | OUTPATIENT
Start: 2024-02-21 | End: 2024-02-21

## 2024-02-21 RX ADMIN — ONDANSETRON 4 MG: 4 TABLET, ORALLY DISINTEGRATING ORAL at 15:51

## 2024-02-21 ASSESSMENT — PAIN - FUNCTIONAL ASSESSMENT: PAIN_FUNCTIONAL_ASSESSMENT: 0-10

## 2024-02-21 ASSESSMENT — PAIN SCALES - GENERAL: PAINLEVEL_OUTOF10: 4

## 2024-02-21 ASSESSMENT — LIFESTYLE VARIABLES
EVER FELT BAD OR GUILTY ABOUT YOUR DRINKING: NO
HAVE YOU EVER FELT YOU SHOULD CUT DOWN ON YOUR DRINKING: NO
EVER HAD A DRINK FIRST THING IN THE MORNING TO STEADY YOUR NERVES TO GET RID OF A HANGOVER: NO
HAVE PEOPLE ANNOYED YOU BY CRITICIZING YOUR DRINKING: NO

## 2024-02-21 ASSESSMENT — COLUMBIA-SUICIDE SEVERITY RATING SCALE - C-SSRS
6. HAVE YOU EVER DONE ANYTHING, STARTED TO DO ANYTHING, OR PREPARED TO DO ANYTHING TO END YOUR LIFE?: NO
2. HAVE YOU ACTUALLY HAD ANY THOUGHTS OF KILLING YOURSELF?: NO
1. IN THE PAST MONTH, HAVE YOU WISHED YOU WERE DEAD OR WISHED YOU COULD GO TO SLEEP AND NOT WAKE UP?: NO

## 2024-02-21 ASSESSMENT — PAIN DESCRIPTION - PAIN TYPE: TYPE: ACUTE PAIN

## 2024-02-21 ASSESSMENT — PAIN DESCRIPTION - ORIENTATION: ORIENTATION: RIGHT

## 2024-02-21 ASSESSMENT — PAIN DESCRIPTION - LOCATION: LOCATION: KNEE

## 2024-02-21 NOTE — DISCHARGE INSTRUCTIONS
Seek immediate medical attention if your wound develops: increasing bleeding, redness, swelling, warmth to touch, discharge or drainage, increasing pain, or any new or worsening symptoms.    Seek immediate medical attention if you develop: increasing pain, numbness, tingling, weakness, loss of motion in your knee or leg, your leg becomes pale or cold, or you develop any new or worsening symptoms.

## 2024-02-21 NOTE — ED PROVIDER NOTES
HPI   Chief Complaint   Patient presents with    Post-op Problem     Had right knee scope done with Dr. Ramachandran last week. Reports wound is open and bleeding.        Patient is a 38-year-old female presenting to [facility] ED following bleeding of surgical wound following arthroscopic procedure this past Thursday.  Procedure was performed by Dr. Ramachandran.  Patient reports that when changing her dressing and getting into the shower she flexed her knee.  When doing so, blood spread out from the medial side arthroscopic site.  Patient reports 2-3 strong spurts of blood.  Afterwards it was just oozing blood.  Patient denies any recent fever, chills, nausea, vomiting, dizziness, or weakness.                          [place] Coma Scale Score: 15                     Patient History   Past Medical History:   Diagnosis Date    Acute maxillary sinusitis, unspecified 06/01/2019    Acute maxillary sinusitis    ADHD (attention deficit hyperactivity disorder) 10/31/23    Allergic     Anxiety     Body mass index (BMI) 37.0-37.9, adult 02/23/2022    BMI 37.0-37.9, adult    Body mass index (BMI)40.0-44.9, adult 09/19/2022    BMI 40.0-44.9, adult    Contact with and (suspected) exposure to covid-19 10/31/2020    Encounter for laboratory testing for COVID-19 virus    COVID-19 11/05/2020    COVID-19 virus detected    Depression     Disorder of the skin and subcutaneous tissue, unspecified 02/14/2020    Skin lesion    Eating disorder     Morbid (severe) obesity due to excess calories (CMS/[facility]) 09/19/2022    Class 3 severe obesity due to excess calories without serious comorbidity with body mass index (BMI) of 40.0 to 44.9 in adult    Other conditions influencing health status 10/31/2020    History of cough    Other obesity due to excess calories 02/23/2022    Class 2 obesity due to excess calories without serious comorbidity with body mass index (BMI) of 37.0 to 37.9 in adult    Personal history of other drug therapy 11/07/2019    History  of influenza vaccination    Personal history of other specified conditions 12/07/2020    History of headache     Past Surgical History:   Procedure Laterality Date    EYE SURGERY  2019    KNEE SURGERY Right     OTHER SURGICAL HISTORY  07/24/2019    No history of surgery    WISDOM TOOTH EXTRACTION       Family History   Problem Relation Name Age of Onset    Asthma Father Josse     Hypertension Father Josse     Hypertension Maternal Grandmother Fatou     Mental illness Paternal Grandmother Vaibhav     Alcohol abuse Mother's Sister Diya      Social History     Tobacco Use    Smoking status: Never    Smokeless tobacco: Never   Substance Use Topics    Alcohol use: Yes     Alcohol/week: 1.0 standard drink of alcohol     Types: 1 Glasses of wine per week    Drug use: Never       Physical Exam   ED Triage Vitals [02/21/24 1440]   Temperature Heart Rate Respirations BP   37.4 °C (99.3 °F) 91 18 (!) 151/93      Pulse Ox Temp Source Heart Rate Source Patient Position   96 % Temporal Monitor --      BP Location FiO2 (%)     -- --       Physical Exam  Constitutional:       Appearance: Normal appearance. She is normal weight.   HENT:      Head: Normocephalic and atraumatic.      Nose: Nose normal.      Mouth/Throat:      Mouth: Mucous membranes are moist.      Pharynx: Oropharynx is clear.   Eyes:      Extraocular Movements: Extraocular movements intact.      Conjunctiva/sclera: Conjunctivae normal.      Pupils: Pupils are equal, round, and reactive to light.   Cardiovascular:      Rate and Rhythm: Normal rate and regular rhythm.      Pulses: Normal pulses.      Heart sounds: Normal heart sounds.   Pulmonary:      Effort: Pulmonary effort is normal.      Breath sounds: Normal breath sounds.   Abdominal:      General: Abdomen is flat. Bowel sounds are normal.      Palpations: Abdomen is soft.   Musculoskeletal:         General: Normal range of motion.      Cervical back: Normal range of motion and neck supple.   Skin:     General:  Skin is warm and dry.      Capillary Refill: Capillary refill takes less than 2 seconds.      Comments: Appropriately healing arthroscopic surgical wounds of the right knee skin.  Monitor blood drainage from medial arthroscopic side.   Neurological:      General: No focal deficit present.      Mental Status: She is alert and oriented to person, place, and time. Mental status is at baseline.   Psychiatric:         Mood and Affect: Mood normal.         Behavior: Behavior normal.         ED Course & MDM   Diagnoses as of 02/21/24 6117   Postoperative hemorrhage of skin following non-dermatologic procedure       Medical Decision Making  Patient is a 38 y.o. female who presents to [facility] ED for Post-op Problem (Had right knee scope done with Dr. Ramachandran last week. Reports wound is open and bleeding. ). On initial ED evaluation, patient found to be in no acute distress. Per HPI, concern to evaluate and treat for possible hematoma.  Patient right medial side knee arthroscopic wound not actively draining of blood.  Consulting on-call orthopedics for Dr. Ramachandran, Dr. Macias.  Recommended outpatient follow-up and placement of knee immobilizer.  Wound care instructions provided.    Patient to follow up with orthopedics outpatient. Anticipatory guidance and return precautions provided.  Patient otherwise stable for discharge.      =================Attending note===============    The patient was seen by the resident/fellow.  I have personally performed a substantive portion of the encounter.  I have seen and examined the patient; agree with the workup, evaluation, MDM,   management and diagnosis.  The care plan has been discussed with the resident; I have reviewed the resident's note and agree with the documented findings.      This is a 38 y.o. female who presents to ER with bleeding from her incision from her laparoscopic knee surgery site.  States that she was in the shower and she had just bent her knee and started bleeding.  She  states it squirted blood 3 times when she had the knee bent.  There was only a slow ooze after that.  She states she did have postoperative bleeding that soaked through a few dressings.  Has been no fevers or chills.  No chest pain or shortness of breath.  No blood thinners.  No other issues or complaints.  Sound like she may have had a postoperative hematoma that drained.  The wound did not dehisce.  Was only bleeding from the very bottom portion of the incision.  There is a somewhat V-shaped incision from the scope.  There is no warmth erythema of the joint.  She still can move the joint.  There is some mild postoperative swelling.    We did place a call to her orthopedic surgeon, Dr Frazier.    Case discussed with Dr Buenrostro and they recommended knee immobilizer and following up in the office.    This was discussed with the patient and her family.  They are given warning signs of infection or worsening symptoms to return to the nearest ER for.  She is comfortable following with her doctor.  She will return to the nearest ER for any worsening symptoms.          ==========================================          Procedure  Procedures     Becky Echols MD  Resident  02/21/24 1639       Stiven Wade DO  02/23/24 1216

## 2024-02-26 LAB
LABORATORY COMMENT REPORT: NORMAL
PATH REPORT.FINAL DX SPEC: NORMAL
PATH REPORT.GROSS SPEC: NORMAL
PATH REPORT.RELEVANT HX SPEC: NORMAL
PATH REPORT.TOTAL CANCER: NORMAL

## 2024-02-27 ENCOUNTER — OFFICE VISIT (OUTPATIENT)
Dept: ORTHOPEDIC SURGERY | Facility: CLINIC | Age: 39
End: 2024-02-27
Payer: COMMERCIAL

## 2024-02-27 DIAGNOSIS — M25.561 CHRONIC PAIN OF RIGHT KNEE: ICD-10-CM

## 2024-02-27 DIAGNOSIS — G89.29 CHRONIC PAIN OF RIGHT KNEE: ICD-10-CM

## 2024-02-27 PROCEDURE — 99024 POSTOP FOLLOW-UP VISIT: CPT | Performed by: ORTHOPAEDIC SURGERY

## 2024-02-27 PROCEDURE — 1036F TOBACCO NON-USER: CPT | Performed by: ORTHOPAEDIC SURGERY

## 2024-02-27 PROCEDURE — 3008F BODY MASS INDEX DOCD: CPT | Performed by: ORTHOPAEDIC SURGERY

## 2024-02-27 NOTE — PROGRESS NOTES
History of Present Illness:  Patient returns today noting minimal pain.  Denies any calf pain or shortness of breath.    Physical Examination:   Moderate effusion  Healthy incisions - no active drainage  Good range of motion  No calf swelling  Negative Ann Marie´s test  Distal neurovascular exam intact    Operative Findings:  Diffuse wear of the patella, grade 4 changes trochlea    Assessment:  Patient status post right knee arthroscopy chondroplasty patella, excision of giant cell tumor    Plan:  Reviewed arthroscopic photos and findings.  Discussed short and long term implications for the knee.  Discussed analgesics, ice, rest.  Encouraged home exercise program, physical therapy.     If the swelling persist could consider corticosteroid injection.

## 2024-02-27 NOTE — LETTER
February 27, 2024     Patient: Cassia Martin   YOB: 1985   Date of Visit: 2/27/2024       To Whom it May Concern:    Cassia Martin was seen in my clinic on 2/27/2024. She  should remain off work until 3/11/2024 .    If you have any questions or concerns, please don't hesitate to call.         Sincerely,          Jeronimo Frazier MD        CC: No Recipients

## 2024-02-28 DIAGNOSIS — M25.561 CHRONIC PAIN OF RIGHT KNEE: Primary | ICD-10-CM

## 2024-02-28 DIAGNOSIS — G89.29 CHRONIC PAIN OF RIGHT KNEE: Primary | ICD-10-CM

## 2024-03-05 ENCOUNTER — APPOINTMENT (OUTPATIENT)
Dept: ALLERGY | Facility: CLINIC | Age: 39
End: 2024-03-05
Payer: COMMERCIAL

## 2024-03-07 ENCOUNTER — OFFICE VISIT (OUTPATIENT)
Dept: PRIMARY CARE | Facility: CLINIC | Age: 39
End: 2024-03-07
Payer: COMMERCIAL

## 2024-03-07 VITALS
DIASTOLIC BLOOD PRESSURE: 80 MMHG | OXYGEN SATURATION: 96 % | HEART RATE: 86 BPM | WEIGHT: 198 LBS | SYSTOLIC BLOOD PRESSURE: 110 MMHG | HEIGHT: 62 IN | RESPIRATION RATE: 16 BRPM | BODY MASS INDEX: 36.44 KG/M2 | TEMPERATURE: 97.6 F

## 2024-03-07 DIAGNOSIS — E66.09 CLASS 2 OBESITY DUE TO EXCESS CALORIES WITHOUT SERIOUS COMORBIDITY WITH BODY MASS INDEX (BMI) OF 36.0 TO 36.9 IN ADULT: ICD-10-CM

## 2024-03-07 DIAGNOSIS — J06.9 UPPER RESPIRATORY TRACT INFECTION, UNSPECIFIED TYPE: Primary | ICD-10-CM

## 2024-03-07 DIAGNOSIS — F41.1 GENERALIZED ANXIETY DISORDER: ICD-10-CM

## 2024-03-07 PROBLEM — E66.812 CLASS 2 OBESITY DUE TO EXCESS CALORIES WITHOUT SERIOUS COMORBIDITY WITH BODY MASS INDEX (BMI) OF 36.0 TO 36.9 IN ADULT: Status: ACTIVE | Noted: 2024-03-07

## 2024-03-07 PROCEDURE — 1036F TOBACCO NON-USER: CPT | Performed by: FAMILY MEDICINE

## 2024-03-07 PROCEDURE — 99213 OFFICE O/P EST LOW 20 MIN: CPT | Performed by: FAMILY MEDICINE

## 2024-03-07 PROCEDURE — 3008F BODY MASS INDEX DOCD: CPT | Performed by: FAMILY MEDICINE

## 2024-03-07 RX ORDER — AZITHROMYCIN 250 MG/1
TABLET, FILM COATED ORAL
Qty: 6 TABLET | Refills: 0 | Status: SHIPPED | OUTPATIENT
Start: 2024-03-07 | End: 2024-03-11

## 2024-03-07 ASSESSMENT — ENCOUNTER SYMPTOMS
HOARSE VOICE: 1
HEADACHES: 0
ABDOMINAL PAIN: 0
STRIDOR: 0
DIARRHEA: 0
SORE THROAT: 1
NECK PAIN: 0
VOMITING: 0
TROUBLE SWALLOWING: 1
SHORTNESS OF BREATH: 0
SWOLLEN GLANDS: 0
COUGH: 1

## 2024-03-07 NOTE — PROGRESS NOTES
Subjective   Patient ID: Cassia Martin is a 38 y.o. female who presents for Sore Throat, Hypertension, and Headache.  Sore Throat   This is a new problem. The current episode started in the past 7 days. The problem has been gradually worsening. Neither side of throat is experiencing more pain than the other. There has been no fever. The pain is at a severity of 7/10. Associated symptoms include congestion, coughing, a hoarse voice and trouble swallowing. Pertinent negatives include no abdominal pain, diarrhea, drooling, ear discharge, ear pain, headaches, plugged ear sensation, neck pain, shortness of breath, stridor, swollen glands or vomiting. She has had no exposure to strep or mono.     Pt is also having elevated BP at night   Ongoing for 2 month   As high as 158 to over 100s  Pt is having headaches     No other concern   Review of systems  ; Patient seen today for exam denies any problems with headaches or vision, denies any shortness of breath chest pain nausea or vomiting, no black stool no blood in the stool no heartburn type symptoms denies any problems with constipation or diarrhea, and no dysuria-type symptoms    The patient's allergies medications were reviewed with them today    The patient's social family and surgical history or also reviewed here today, along with her past medical history.     Objective     Alert and active in  no acute distress  HEENT TMs clear oropharynx negative nares clear pos drainage noted neck supple  With no adenopathy   Heart regular rate and rhythm without murmur and no carotid bruits  Lungs- clear to auscultation bilaterally, no wheeze or rhonchi noted  Thyroid -negative masses or nodularity  Abdomen- soft times four quadrants , bowel sounds positive no masses or organomegaly, negative tenderness guarding or rebound  Neurological exam unremarkable- DTRs in upper and lower extremities within normal limits.   skin -no lesions noted      /80 (BP Location: Left arm,  "Patient Position: Sitting, BP Cuff Size: Large adult)   Pulse 86   Temp 36.4 °C (97.6 °F) (Temporal)   Resp 16   Ht 1.575 m (5' 2\")   Wt 89.8 kg (198 lb)   LMP 02/12/2024   SpO2 96%   BMI 36.21 kg/m²     Allergies   Allergen Reactions    Bupropion Hcl Dermatitis, Hives and Itching    Cefdinir Diarrhea and Hives    Cyclosporine Hives    Phentermine Cardiac arrhythmia/arrest    Latex Rash    Quinolones Rash    Sulfa (Sulfonamide Antibiotics) Hives and Rash       Assessment/Plan   Problem List Items Addressed This Visit       Generalized anxiety disorder    Class 2 obesity due to excess calories without serious comorbidity with body mass index (BMI) of 36.0 to 36.9 in adult    BMI 36.0-36.9,adult     Other Visit Diagnoses       Upper respiratory tract infection, unspecified type    -  Primary    Relevant Medications    azithromycin (Zithromax) 250 mg tablet              If anything worsens or changes please call us at once, follow up in the office as planned,    "

## 2024-03-14 ENCOUNTER — EVALUATION (OUTPATIENT)
Dept: PHYSICAL THERAPY | Facility: CLINIC | Age: 39
End: 2024-03-14
Payer: COMMERCIAL

## 2024-03-14 DIAGNOSIS — G89.29 CHRONIC PAIN OF RIGHT KNEE: Primary | ICD-10-CM

## 2024-03-14 DIAGNOSIS — M25.561 CHRONIC PAIN OF RIGHT KNEE: Primary | ICD-10-CM

## 2024-03-14 PROCEDURE — 97161 PT EVAL LOW COMPLEX 20 MIN: CPT | Mod: GP | Performed by: PHYSICAL THERAPIST

## 2024-03-14 PROCEDURE — 97110 THERAPEUTIC EXERCISES: CPT | Mod: GP | Performed by: PHYSICAL THERAPIST

## 2024-03-14 ASSESSMENT — PAIN SCALES - GENERAL: PAINLEVEL_OUTOF10: 1

## 2024-03-14 ASSESSMENT — PAIN - FUNCTIONAL ASSESSMENT: PAIN_FUNCTIONAL_ASSESSMENT: 0-10

## 2024-03-14 ASSESSMENT — PAIN DESCRIPTION - DESCRIPTORS: DESCRIPTORS: SHARP

## 2024-03-14 NOTE — PROGRESS NOTES
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 The patient presents with the current impairments of pain, decreased ROM, weakness, impaired mobility, swelling, and decreased stability of the knee joint. These impairments currently limit their ability to stand for extended periods of time, ambulate, negotiate stairs, perform transfers, and participate in recreation activities such as working out at the gym. Due to the limitations listed above, the patient is currently at a decreased functional level compared to baseline, and they would benefit from skilled physical therapy to improve pain intensity, strength, flexibility, mobility, and facilitate a safe and efficient return to functional baseline. Patient's prognosis for improvement with therapy is good at this time.  PT Assessment  PT Assessment Results: Decreased strength, Decreased range of motion, Decreased endurance, Impaired balance, Decreased mobility, Pain, Orthopedic restrictions  Rehab Prognosis: Good  Evaluation/Treatment Tolerance: Patient tolerated treatment well     Plan:  OP PT Plan  Treatment/Interventions: Aquatic therapy, Cryotherapy, Dry needling, Education/ Instruction, Electrical stimulation, Hot pack, Gait training, Manual therapy, Neuromuscular re-education, Self care/ home management, Taping techniques, Therapeutic activities, Therapeutic exercises, Ultrasound, Vasopneumatic device  PT Plan: Skilled PT  PT Frequency: 1 time per week  Duration: 7 additional visits  Onset Date:  02/15/24  Certification Period Start Date: 03/14/24  Certification Period End Date: 06/12/24  Rehab Potential: Good  Plan of Care Agreement: Patient    Complexity:  Low    Current Problem:   1. 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 Patient notes that prior to surgery she was having a hard time running which led to the MRI. Patient reports that the tumor was non-cancerous. Since surgery she notes some swelling, as well as intermittent buckling of the knee a few times a day, especially when going down the stairs. Pain is a 1/10 at this time, 6/10 at worst, 0/10 at best. Pain is located medially and posteriorly. She denies any trouble sleeping at this time. Endorses numbness at the incision, but nothing else in the legs.   General:  General  Reason for Referral: R knee pain s/p R chondroplasty patella, excision of giant cell tumor 2/15/24  Referred By: Dr. Jeronimo Frazier  Past Medical History Relevant to Rehab: hx of OA  Precautions:  Precautions  STEADI Fall Risk Score (The score of 4 or more indicates an increased risk of falling): 0  Precautions Comment: WBAT; hx of OA  Pain:  Pain Assessment  Pain Assessment: 0-10  Pain Score: 1  Pain Type: Chronic pain  Pain Location: Knee  Pain Orientation: Right  Pain Descriptors: Sharp    Objective   Knee AROM (*indicates pain):  Flexion R 106*, L 126  Extension R 4*, L 5 hyper    Knee PROM (*indicates pain):   Flexion R 108*, L NT  Extension R 6*, L NT    LE strength (*indicates pain):  Knee flexion R 4-/5*, L 5/5  Knee extension R 4-/5, L 5/5  Hip flexion R 4-/5, L 4+/5  Hip abd R 4-/5, L 4+/5  Hip ext R 4-/5, L 4+/5    Knee strength (lbs)  Not assessed this date    Dermatomes: intact  Gait: amb without axillary crutches, decreased stance time on R, lacks full extension and heel strike on R, lacks trailing limb fully  on R   Palpation: TTP to the medial and lateral joint line on the right, mild tenderness to the superior patella  Sit to stand: without UE support, increased time to complete  Edema: mild swelling in the right knee compared to the left  Observation: incisions appear dry and healing well without obvious signs of infection  Fracisco negative       Outcome Measures:  Other Measures  Lower Extremity Funtional Score (LEFS): 34/80     Treatments:  Therapeutic exercises:  Heel slides with strap x10  Quad set x10  SLR with quad set x10  Seated knee extension stretch x1.5'  (14')    EDUCATION:  Outpatient Education  Individual(s) Educated: Patient  Education Provided: Anatomy, Body Mechanics, Home Exercise Program, Home Safety, Physiology, POC, Post-Op Precautions, Signs/Symptoms of Infection  Risk and Benefits Discussed with Patient/Caregiver/Other: yes  Patient/Caregiver Demonstrated Understanding: yes  Plan of Care Discussed and Agreed Upon: yes  Patient Response to Education: Patient/Caregiver Verbalized Understanding of Information, Patient/Caregiver Performed Return Demonstration of Exercises/Activities, Patient/Caregiver Asked Appropriate Questions  Exercises  - Supine Heel Slide with Strap  - 2 x daily - 7 x weekly - 1 sets - 10 reps - 3sec hold  - Seated Knee Extension Stretch with Chair  - 2 x daily - 7 x weekly - 1 sets - 1 reps - 1-2min hold  - Supine Quad Set  - 2 x daily - 7 x weekly - 1 sets - 10 reps - 5sec hold  - Active Straight Leg Raise with Quad Set  - 2 x daily - 7 x weekly - 1 sets - 10 reps  - Seated Table Hamstring Stretch  - 2 x daily - 7 x weekly - 1 sets - 3 reps - 30 sec hold    Goals:  By discharge:  1. Patient will report and demonstrate independence with current HEP  2. Patient will demonstrate AROM of the R knee 0-120 to improve their ability to ambulate, negotiate stairs, and squat without restriction  3. Patient will demonstrate the ability to ascend/descend one flight of stairs reciprocally  and without an increase in pain to improve function within the home and the community  4. Patient will demonstrate gross hip and knee strength of >/= 4+/5 to improve the ability to ambulate, squat, and lift without restrictions  5. Patient will demonstrate an increase in Lower Extremity Functional Scale score by 26 points to 60/80 to meet established MCID (baseline 3/14/24 34/80)  6. Patient will demonstrate the ability to perform 15 bodyweight squats demonstrating good hip and knee mechanics and without pain in the knee exceeding 2/10 to indicate a return to functional movements  7. Patient will demonstrate the ability to perform 15 forward and lateral lunges without pain in the knee exceeding 2/10 and while maintaining proper hip and knee mechanics  8. Patient will demonstrate improved hamstring and quadriceps strength to >/= 85% of the contralateral side measured in lbs of force to indicate improved stability of the knee during higher level recreation activities such as working out at the gym  9. Patient will report pain of 0/10 at rest, and no greater than 2/10 with any activity including but not limited to standing, walking, stairs, and squatting to improve her tolerance to all daily activities

## 2024-03-19 ENCOUNTER — TREATMENT (OUTPATIENT)
Dept: PHYSICAL THERAPY | Facility: CLINIC | Age: 39
End: 2024-03-19
Payer: COMMERCIAL

## 2024-03-19 DIAGNOSIS — G89.29 CHRONIC PAIN OF RIGHT KNEE: Primary | ICD-10-CM

## 2024-03-19 DIAGNOSIS — M25.561 CHRONIC PAIN OF RIGHT KNEE: Primary | ICD-10-CM

## 2024-03-19 PROCEDURE — 97110 THERAPEUTIC EXERCISES: CPT | Mod: GP | Performed by: PHYSICAL THERAPIST

## 2024-03-19 ASSESSMENT — PAIN SCALES - GENERAL: PAINLEVEL_OUTOF10: 1

## 2024-03-19 ASSESSMENT — PAIN - FUNCTIONAL ASSESSMENT: PAIN_FUNCTIONAL_ASSESSMENT: 0-10

## 2024-03-19 NOTE — PROGRESS NOTES
Physical Therapy    Physical Therapy Treatment    Patient Name: Cassia Martin  MRN: [id number]  Today's Date: 3/19/2024  Time Calculation  Start Time: 0933  Stop Time: 1015  Time Calculation (min): 42 min  Insurance:  UMR  $60 copay, 0% coinsurance  BMN МАРИЯ KASPER not req  Visit: 2  POC: 8    Assessment:  Therapeutic exercises performed this date target improving flexibility, strength, stability, and tolerance to movement with the right knee. Patient tolerates treatment well, but reports some mild increase in discomfort in the right knee throughout activity, but is able to complete all activities fully. Patient demonstrates a good improvement in both knee flexion and extension ROM compared to initial evaluation. HEP updated for adjustments in knee stretches at home due to increased pain. Patient is encouraged to discontinue heel slides in favor of knee flexion stretch at the step, and hold knee extension stretch due to good improvement in ROM at this time. She is progressing well, and remains a good candidate for additional skilled PT.   PT Assessment  PT Assessment Results: Decreased strength, Decreased range of motion, Decreased endurance, Impaired balance, Decreased mobility, Pain, Orthopedic restrictions  Rehab Prognosis: Good    Plan:  OP PT Plan  PT Plan: Skilled PT  Rehab Potential: Good  Plan of Care Agreement: Patient  Continue to progress strength and ROM as tolerated    Current Problem  1. 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 Jeronimo Frazier  Past Medical History Relevant to Rehab: hx of OA    Subjective    Patient reports that she has been dealing with some increased pain along the knee joint during the heel slide exercise, and describes the pain as sharp. Increased soreness/uncomfortable with the hamstring stretch and knee extension stretch.  "  Precautions  Precautions  STEADI Fall Risk Score (The score of 4 or more indicates an increased risk of falling): 0  Precautions Comment: WBAT; hx of OA  Pain  Pain Assessment  Pain Assessment: 0-10  Pain Score: 1  Pain Type: Chronic pain  Pain Location: Knee  Pain Orientation: Right    Objective   R knee AROM flexion 116*  R knee PROM extension 2   R knee AROM extension 0     Treatments:  Therapeutic exercises:  Recumbent bike SH 13 L0 x5'   HS stretch at step 3x30\"  Calf stretch at step 3x30\"   Knee flexion stretch at step 15x5\"   Heel slides with strap held d/t sharp pain  Quad set x15  SLR with quad set x15  SAQ with BS x15  TKE GTB x15  Step ups fwd/lateral 6\" x15 ea   Clamshells GTB x15  Prone hip extension x10  (40')    OP EDUCATION:     HEP:  Exercises  - Standing Knee Flexion Stretch on Step  - 1 x daily - 7 x weekly - 1 sets - 10 reps - 5 sec hold  - Clamshell with Resistance  - 1 x daily - 7 x weekly - 2 sets - 10 reps  - Prone Hip Extension  - 1 x daily - 7 x weekly - 2 sets - 10 reps  Goals:  By discharge:  1. Patient will report and demonstrate independence with current HEP  2. Patient will demonstrate AROM of the R knee 0-120 to improve their ability to ambulate, negotiate stairs, and squat without restriction  3. Patient will demonstrate the ability to ascend/descend one flight of stairs reciprocally and without an increase in pain to improve function within the home and the community  4. Patient will demonstrate gross hip and knee strength of >/= 4+/5 to improve the ability to ambulate, squat, and lift without restrictions  5. Patient will demonstrate an increase in Lower Extremity Functional Scale score by 26 points to 60/80 to meet established MCID (baseline 3/14/24 34/80)  6. Patient will demonstrate the ability to perform 15 bodyweight squats demonstrating good hip and knee mechanics and without pain in the knee exceeding 2/10 to indicate a return to functional movements  7. Patient will " demonstrate the ability to perform 15 forward and lateral lunges without pain in the knee exceeding 2/10 and while maintaining proper hip and knee mechanics  8. Patient will demonstrate improved hamstring and quadriceps strength to >/= 85% of the contralateral side measured in lbs of force to indicate improved stability of the knee during higher level recreation activities such as working out at the gym  9. Patient will report pain of 0/10 at rest, and no greater than 2/10 with any activity including but not limited to standing, walking, stairs, and squatting to improve her tolerance to all daily activities

## 2024-03-25 ENCOUNTER — TREATMENT (OUTPATIENT)
Dept: PHYSICAL THERAPY | Facility: CLINIC | Age: 39
End: 2024-03-25
Payer: COMMERCIAL

## 2024-03-25 DIAGNOSIS — M25.561 CHRONIC PAIN OF RIGHT KNEE: Primary | ICD-10-CM

## 2024-03-25 DIAGNOSIS — G89.29 CHRONIC PAIN OF RIGHT KNEE: Primary | ICD-10-CM

## 2024-03-25 PROCEDURE — 97110 THERAPEUTIC EXERCISES: CPT | Mod: GP,CQ

## 2024-03-25 ASSESSMENT — PAIN SCALES - GENERAL: PAINLEVEL_OUTOF10: 1

## 2024-03-25 ASSESSMENT — PAIN - FUNCTIONAL ASSESSMENT: PAIN_FUNCTIONAL_ASSESSMENT: 0-10

## 2024-03-25 NOTE — PROGRESS NOTES
Physical Therapy    Physical Therapy Treatment    Patient Name: Cassia Martin  MRN: [id number]  Today's Date: 3/25/2024  Time Calculation  Start Time: 0146  Stop Time: 0231  Time Calculation (min): 45 min  Insurance:  UMR  $60 copay, 0% coinsurance  BMN МАРИЯ KASPER not req  Visit: 3  POC: 8    Assessment:  Therapeutic exercises performed this date target improving flexibility, strength, stability, and tolerance to movement with the right knee. Patient tolerates treatment well, but reports some mild increase in discomfort in the right knee throughout activity, but is able to complete all activities fully. Patient demonstrates a good improvement in both knee flexion and extension ROM (goal met). HEP updated to increase quad strength. Patient declined CP and to ice at home. Patient would benefit from P.T. to continue to address impairments in order to improve strength, flexibility, posture, and  to decrease symptoms and increase overall function.    PT Assessment  Evaluation/Treatment Tolerance: Patient tolerated treatment well    Plan:  Increase glute and VMO strength as able.  OP PT Plan  PT Plan: Skilled PT  Continue to progress strength and ROM as tolerated    Current Problem  1. 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 Jeronimo Frazier  Past Medical History Relevant to Rehab: hx of OA    Subjective    Patient reports 1-2/10 pain currently. Notes knee has not been giving out as much lately. Reports shooting hoops and accidentally forgot and ran after the loose ball and increased soreness after for ~ 1 day.    Precautions  Precautions  Precautions Comment: WBAT; hx of OASTEADI Fall Risk Score (The score of 4 or more indicates an increased risk of falling): 0  Precautions Comment: WBAT; hx of OA    Pain  Pain Assessment  Pain Assessment: 0-10  Pain  "Score: 1    Objective   R knee AROM flexion 126*  R knee PROM extension +1   R knee AROM extension 0     Treatments:  Therapeutic exercises:  Recumbent bike SH 13 L7 x6'   HS stretch at step 3x30\"  Calf stretch at step 3x30\"   Heel slides without strap x15  LAQ with BS x15  TKE 3pl x15  Step ups fwd/lateral 6\" x15 ea   3 way hip B x15ea  Clamshells GTB x20  Prone hip extension (knee flex and ext) x15ea  Prone QS 5\"x15  Includes KT to pull R patella medial (Pt given verbal tape wearing instructions) and TPR to R mid-distal vastus lateralis.      OP EDUCATION:   Exercises  - Standing Knee Flexion Stretch on Step  - 1 x daily - 7 x weekly - 1 sets - 10 reps - 5 sec hold  - Clamshell with Resistance  - 1 x daily - 7 x weekly - 2 sets - 10 reps  - Prone Hip Extension  - 1 x daily - 7 x weekly - 2 sets - 10 reps    Goals:  By discharge:  1. Patient will report and demonstrate independence with current HEP  2. Patient will demonstrate AROM of the R knee 0-120 to improve their ability to ambulate, negotiate stairs, and squat without restriction  3. Patient will demonstrate the ability to ascend/descend one flight of stairs reciprocally and without an increase in pain to improve function within the home and the community  4. Patient will demonstrate gross hip and knee strength of >/= 4+/5 to improve the ability to ambulate, squat, and lift without restrictions  5. Patient will demonstrate an increase in Lower Extremity Functional Scale score by 26 points to 60/80 to meet established MCID (baseline 3/14/24 34/80)  6. Patient will demonstrate the ability to perform 15 bodyweight squats demonstrating good hip and knee mechanics and without pain in the knee exceeding 2/10 to indicate a return to functional movements  7. Patient will demonstrate the ability to perform 15 forward and lateral lunges without pain in the knee exceeding 2/10 and while maintaining proper hip and knee mechanics  8. Patient will demonstrate improved " hamstring and quadriceps strength to >/= 85% of the contralateral side measured in lbs of force to indicate improved stability of the knee during higher level recreation activities such as working out at the gym  9. Patient will report pain of 0/10 at rest, and no greater than 2/10 with any activity including but not limited to standing, walking, stairs, and squatting to improve her tolerance to all daily activities

## 2024-03-26 ENCOUNTER — OFFICE VISIT (OUTPATIENT)
Dept: ORTHOPEDIC SURGERY | Facility: CLINIC | Age: 39
End: 2024-03-26
Payer: COMMERCIAL

## 2024-03-26 DIAGNOSIS — G89.18 POST-OP PAIN: Primary | ICD-10-CM

## 2024-03-26 PROCEDURE — 1036F TOBACCO NON-USER: CPT | Performed by: STUDENT IN AN ORGANIZED HEALTH CARE EDUCATION/TRAINING PROGRAM

## 2024-03-26 PROCEDURE — 3008F BODY MASS INDEX DOCD: CPT | Performed by: STUDENT IN AN ORGANIZED HEALTH CARE EDUCATION/TRAINING PROGRAM

## 2024-03-26 PROCEDURE — 99024 POSTOP FOLLOW-UP VISIT: CPT | Performed by: STUDENT IN AN ORGANIZED HEALTH CARE EDUCATION/TRAINING PROGRAM

## 2024-03-26 ASSESSMENT — PAIN SCALES - GENERAL: PAINLEVEL_OUTOF10: 1

## 2024-03-26 ASSESSMENT — PAIN - FUNCTIONAL ASSESSMENT: PAIN_FUNCTIONAL_ASSESSMENT: 0-10

## 2024-03-26 NOTE — PROGRESS NOTES
History of Present Illness:   Patient presents today status post right knee arthroscopy with excision of anterior mass as well as chondroplasty of the patella and trochlea.  Overall is doing well and pain is improving.  She is traveling on a trip to [place] soon.  She does note some occasional swelling that she takes ibuprofen for, otherwise doing very well.  Physical therapy recently started and she will continue this.    Review of Systems   GENERAL: Negative for malaise, significant weight loss, fever  MUSCULOSKELETAL: see HPI  NEURO:  Negative    Physical Examination:  Right knee:  Incisions intact and well-healing  Mild swelling with mild effusion  No discoloration present  Full range of motion compared to contralateral side  Appropriate strength for postoperative course  Distal neurovascular exam is intact    Imaging:  Deferred    Assessment:   Patient status post right knee arthroscopy with excision of anterior mass and chondroplasty of patellofemoral joint    Plan:  Immobilization: None  Weightbearing: As tolerated  We discussed the overall progression of this recovery process with the patient, discussed there is a possibility of overuse and would recommend anti-inflammatories as needed.  Gently progress physical therapy to include more strengthening as we do feel her quadriceps muscles have atrophied slightly postoperatively.  Follow-up in 2 months to check strength    Miles Figueroa PA-C

## 2024-04-02 ENCOUNTER — TREATMENT (OUTPATIENT)
Dept: PHYSICAL THERAPY | Facility: CLINIC | Age: 39
End: 2024-04-02
Payer: COMMERCIAL

## 2024-04-02 DIAGNOSIS — G89.29 CHRONIC PAIN OF RIGHT KNEE: Primary | ICD-10-CM

## 2024-04-02 DIAGNOSIS — M25.561 CHRONIC PAIN OF RIGHT KNEE: Primary | ICD-10-CM

## 2024-04-02 PROCEDURE — 97110 THERAPEUTIC EXERCISES: CPT | Mod: GP,CQ

## 2024-04-02 ASSESSMENT — PAIN SCALES - GENERAL: PAINLEVEL_OUTOF10: 1

## 2024-04-02 ASSESSMENT — PAIN - FUNCTIONAL ASSESSMENT: PAIN_FUNCTIONAL_ASSESSMENT: 0-10

## 2024-04-02 NOTE — PROGRESS NOTES
Physical Therapy    Physical Therapy Treatment    Patient Name: Cassia Martin  MRN: [id number]  Today's Date: 4/2/2024    Time Calculation  Start Time: 0316  Stop Time: 0355  Time Calculation (min): 39 min    PT Therapeutic Procedures Time Entry  Therapeutic Exercise Time Entry: 39   Insurance:  UMR  $60 copay, 0% coinsurance  IVET KASPER not req  Visit: 4  POC: 8    Assessment:  Pt had no complaints of increased pain during or after exercises. She declined ice. Will ice at home prn. Pt would benefit from PT to continue to address impairments in order to improve strength, flexibility, gait, and body mechanics and to decrease symptoms and increase overall function.  PT Assessment  PT Assessment Results: Decreased strength, Decreased range of motion, Decreased endurance, Impaired balance, Decreased mobility, Pain, Orthopedic restrictions    Plan:  Increase glute and VMO strength as able.  OP PT Plan  PT Plan: Skilled PT  Continue to progress strength and ROM as tolerated    Current Problem  1. 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 Jeronimo Frazier  Past Medical History Relevant to Rehab: hx of OA    Subjective      Pt reports minimal soreness in right knee currently. Pt states that she removed the kinesiotape after a couple of hours because it was uncomfortable.   Precautions  Precautions  STEADI Fall Risk Score (The score of 4 or more indicates an increased risk of falling): 0  Precautions Comment: WBAT; hx of OASTEADI Fall Risk Score (The score of 4 or more indicates an increased risk of falling): 0  Precautions Comment: WBAT; hx of OA    Pain  Pain Assessment  Pain Assessment: 0-10  Pain Score: 1  Pain Location: Knee  Pain Orientation: Right    Objective     AROM   0-124      Treatments:  Therapeutic exercises:  Recumbent bike SH 13 L7 x6'   HS stretch at  "step 3x30\"  Calf stretch at step 3x30\"   Heel slides without strap x15  LAQ with BS x15  TKE 3pl x15  Step ups fwd/lateral 6\" x15 ea   3 way hip B x15ea  Clamshells GTB x20  Prone hip extension (knee flex and ext) x15ea  Prone QS 5\"x 15 NT  Side stepping with red band in hallway 20 ft x2  Monster walks with red band 20 ft x 2  Mini squat x 10           OP EDUCATION:   Exercises  - Standing Knee Flexion Stretch on Step  - 1 x daily - 7 x weekly - 1 sets - 10 reps - 5 sec hold  - Clamshell with Resistance  - 1 x daily - 7 x weekly - 2 sets - 10 reps  - Prone Hip Extension  - 1 x daily - 7 x weekly - 2 sets - 10 reps    Goals:  By discharge:  1. Patient will report and demonstrate independence with current HEP  2. Patient will demonstrate AROM of the R knee 0-120 to improve their ability to ambulate, negotiate stairs, and squat without restriction  3. Patient will demonstrate the ability to ascend/descend one flight of stairs reciprocally and without an increase in pain to improve function within the home and the community  4. Patient will demonstrate gross hip and knee strength of >/= 4+/5 to improve the ability to ambulate, squat, and lift without restrictions  5. Patient will demonstrate an increase in Lower Extremity Functional Scale score by 26 points to 60/80 to meet established MCID (baseline 3/14/24 34/80)  6. Patient will demonstrate the ability to perform 15 bodyweight squats demonstrating good hip and knee mechanics and without pain in the knee exceeding 2/10 to indicate a return to functional movements  7. Patient will demonstrate the ability to perform 15 forward and lateral lunges without pain in the knee exceeding 2/10 and while maintaining proper hip and knee mechanics  8. Patient will demonstrate improved hamstring and quadriceps strength to >/= 85% of the contralateral side measured in lbs of force to indicate improved stability of the knee during higher level recreation activities such as working out " at the gym  9. Patient will report pain of 0/10 at rest, and no greater than 2/10 with any activity including but not limited to standing, walking, stairs, and squatting to improve her tolerance to all daily activities

## 2024-04-11 ENCOUNTER — APPOINTMENT (OUTPATIENT)
Dept: PHYSICAL THERAPY | Facility: CLINIC | Age: 39
End: 2024-04-11
Payer: COMMERCIAL

## 2024-04-18 ENCOUNTER — TREATMENT (OUTPATIENT)
Dept: PHYSICAL THERAPY | Facility: CLINIC | Age: 39
End: 2024-04-18
Payer: COMMERCIAL

## 2024-04-18 DIAGNOSIS — G89.29 CHRONIC PAIN OF RIGHT KNEE: Primary | ICD-10-CM

## 2024-04-18 DIAGNOSIS — M25.561 CHRONIC PAIN OF RIGHT KNEE: Primary | ICD-10-CM

## 2024-04-18 PROCEDURE — 97110 THERAPEUTIC EXERCISES: CPT | Mod: GP | Performed by: PHYSICAL THERAPIST

## 2024-04-18 ASSESSMENT — PAIN - FUNCTIONAL ASSESSMENT: PAIN_FUNCTIONAL_ASSESSMENT: 0-10

## 2024-04-18 ASSESSMENT — PAIN SCALES - GENERAL: PAINLEVEL_OUTOF10: 1

## 2024-04-18 NOTE — PROGRESS NOTES
Physical Therapy    Physical Therapy Treatment    Patient Name: Cassia Martin  MRN: [id number]  Today's Date: 4/18/2024    Time Calculation  Start Time: 1101  Stop Time: 1149  Time Calculation (min): 48 min    PT Therapeutic Procedures Time Entry  Therapeutic Exercise Time Entry: 45   Insurance:  UMR  $60 copay, 0% coinsurance  BMN V  PA not req  Visit: 5  POC: 8    Assessment:  Treatment session focuses on therapeutic exercises targeting improving flexibility of the knee, as well as increasing strength and stability of the right lower extremity with an emphasis on the quadriceps and gluteal activation. Patient tolerates all treatment well this date, with only mild increase in pain during ball on wall squatting. Pain significantly reduced when a band is placed around the knees to increased gluteal activation. HEP updated this date for additional progression outside of PT sessions. PT educates patient on need for continual monitoring of her symptoms while increasing her activity to avoid overuse injuries post surgery. Patient declines ice at end of session. She is progressing well, and remains a good candidate for additional skilled PT.   PT Assessment  PT Assessment Results: Decreased strength, Decreased range of motion, Decreased endurance, Impaired balance, Decreased mobility, Pain, Orthopedic restrictions    Plan:  OP PT Plan  PT Plan: Skilled PT  Increase glute and VMO strength as able.  Continue to progress strength and ROM as tolerated    Current Problem  1. 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 Jeronimo Frazier  Past Medical History Relevant to Rehab: hx of OA    Subjective    Patient reports that she has been feeling good for the most part. She does continue to have some mild soreness in the knee, and the knee does buckle on occasion. She has  "been starting to do some more bodyweight activities at the gym, without pain. She has also begun to work on the stair stepper at the gym slowly. Does feel some instability/increased movement in the patella at times.  Precautions  Precautions  STEADI Fall Risk Score (The score of 4 or more indicates an increased risk of falling): 0  Precautions Comment: WBAT; hx of OA    Pain  Pain Assessment  Pain Assessment: 0-10  Pain Score: 1  Pain Location: Knee  Pain Orientation: Right    Objective     Difficulty maintaining single leg stance with the RLE    Increased pain in the anterior/medial knee with ball on wall squats, significantly reduced with band for hip abduction    Treatments:  Therapeutic exercises:  Recumbent bike SH 13 L7 x5'   HS stretch at step 3x30\"  Calf stretch at step 3x30\"   Heel slides without strap x15 NT  LAQ with BS 4# 2x10  HSC BTB 2x10  TKE BTB x15  Step ups fwd/lateral 6\" x15 ea NT  3 way hip B x15ea NT  Clamshells GTB x20 NT  Prone hip extension (knee flex and ext) x15ea NT  Prone QS 5\"x 15 NT  Side stepping with red band in hallway 20 ft x2 NT  Monster walks with red band 20 ft x 2 NT  Mini squat x 10 NT  Resisted walking fwd/bwd (heavy) x5 ea  SLS figure 8s with medium med ball 2x10  Ball on wall squats with GTB for hip abduction 2x10  BOSU 1/2 lunges fwd/lateral x15 ea   (45')    OP EDUCATION:     Exercises  - Squat with Resistance at Thighs  - 1 x daily - 7 x weekly - 2 sets - 10 reps  - Single Leg Stance  - 1 x daily - 7 x weekly - 2 sets - 10 reps - 5 sec hold  - Standing Terminal Knee Extension with Resistance  - 1 x daily - 7 x weekly - 2 sets - 10 reps - 5 sec hold  Goals:  By discharge:  1. Patient will report and demonstrate independence with current HEP  2. Patient will demonstrate AROM of the R knee 0-120 to improve their ability to ambulate, negotiate stairs, and squat without restriction  3. Patient will demonstrate the ability to ascend/descend one flight of stairs reciprocally and " without an increase in pain to improve function within the home and the community  4. Patient will demonstrate gross hip and knee strength of >/= 4+/5 to improve the ability to ambulate, squat, and lift without restrictions  5. Patient will demonstrate an increase in Lower Extremity Functional Scale score by 26 points to 60/80 to meet established MCID (baseline 3/14/24 34/80)  6. Patient will demonstrate the ability to perform 15 bodyweight squats demonstrating good hip and knee mechanics and without pain in the knee exceeding 2/10 to indicate a return to functional movements  7. Patient will demonstrate the ability to perform 15 forward and lateral lunges without pain in the knee exceeding 2/10 and while maintaining proper hip and knee mechanics  8. Patient will demonstrate improved hamstring and quadriceps strength to >/= 85% of the contralateral side measured in lbs of force to indicate improved stability of the knee during higher level recreation activities such as working out at the gym  9. Patient will report pain of 0/10 at rest, and no greater than 2/10 with any activity including but not limited to standing, walking, stairs, and squatting to improve her tolerance to all daily activities

## 2024-04-22 ENCOUNTER — APPOINTMENT (OUTPATIENT)
Dept: ALLERGY | Facility: CLINIC | Age: 39
End: 2024-04-22
Payer: COMMERCIAL

## 2024-04-24 ENCOUNTER — TREATMENT (OUTPATIENT)
Dept: PHYSICAL THERAPY | Facility: CLINIC | Age: 39
End: 2024-04-24
Payer: COMMERCIAL

## 2024-04-24 DIAGNOSIS — G89.29 CHRONIC PAIN OF RIGHT KNEE: Primary | ICD-10-CM

## 2024-04-24 DIAGNOSIS — M25.561 CHRONIC PAIN OF RIGHT KNEE: Primary | ICD-10-CM

## 2024-04-24 PROCEDURE — 97110 THERAPEUTIC EXERCISES: CPT | Mod: GP,CQ

## 2024-04-24 PROCEDURE — 97140 MANUAL THERAPY 1/> REGIONS: CPT | Mod: GP,CQ

## 2024-04-24 ASSESSMENT — PAIN - FUNCTIONAL ASSESSMENT: PAIN_FUNCTIONAL_ASSESSMENT: 0-10

## 2024-04-24 ASSESSMENT — PAIN SCALES - GENERAL: PAINLEVEL_OUTOF10: 1

## 2024-04-24 NOTE — PROGRESS NOTES
Physical Therapy    Physical Therapy Treatment    Patient Name: Cassia Martin  MRN: [id number]  Today's Date: 4/24/2024    Time Calculation  Start Time: 0448  Stop Time: 0538  Time Calculation (min): 50 min    PT Therapeutic Procedures Time Entry  Manual Therapy Time Entry: 12  Therapeutic Exercise Time Entry: 30   Insurance:  UMR  $60 copay, 0% coinsurance  BMN МАРИЯ KASPER not req  Visit: 6  POC: 8    Assessment:  Treatment session focuses on therapeutic exercises targeting improving flexibility of the GS muscyulature, as well as increasing strength and stability of the right lower extremity with an emphasis on the quadriceps and gluteal activation. Patient tolerates all treatment well this date w/o increased pain. Glute HEP reviewed this date for glute strengthening. PT educates patient on need for continual monitoring of her symptoms while increasing her activity to avoid overuse injuries post surgery. Patient declines ice at end of session. She is progressing well, and remains a good candidate for additional skilled PT.   PT Assessment  Evaluation/Treatment Tolerance: Patient tolerated treatment well    Plan: Increase glute and quad strength as able.  OP PT Plan  PT Plan: Skilled PT  Increase glute and VMO strength as able.  Continue to progress strength and ROM as tolerated    Current Problem  1. 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 Jeronimo Frazier  Past Medical History Relevant to Rehab: hx of OAs/p R knee arthroscopy and excision of giant cell tumor and chondroplasty patella on 2/15/24     Subjective    Patient reports that she has been feeling good for the most part. She reports some sharpness noted periodically it does buckle on occasion but not as often; ~ 1x/wk now. Notes some medial inframalleolar muscle pain when she first walks and then it  "loosens up ever since surgery.    Precautions  Precautions  STEADI Fall Risk Score (The score of 4 or more indicates an increased risk of falling): 0  Precautions Comment: WBAT; hx of OA    Pain  Pain Assessment  Pain Assessment: 0-10  Pain Score: 1    Objective   Tps throughout R popliteus.    Treatments:  Therapeutic exercises:  Recumbent bike SH 13 L8 x5'   Elliptical L1 x2'  HS stretch at step 3x30\" nt  Calf stretch at step IR/ ER 3x30\" ea  Heel slides without strap x15 NT  LAQ with BS 4# 2x10  HSC BTB 2x10 nt  Step ups fwd/lateral 6\" x15 ea NT  4 way hip BTB B x15ea   Clamshells OTB 2x15  Prone QS 5\"x 15 NT  Side stepping with red band in hallway 20 ft x2 NT  Monster walks with red band 20 ft x 2 NT  Mini squat x 10 NT  Resisted walking fwd/bwd (heavy) x5 ea  SLS figure 8s with medium med ball 2x10  Ball on wall squats with GTB for hip abduction 2x10  BOSU 1/2 lunges fwd/lateral x15 ea   Leg Press 2pl B 2x10  Prone SLR (knee flex and ext) 2x10ea    Manual:  STM/ TPR to R popliteus. Talocrural inf/post jt mob (gr3) to increase DF ROM.      OP EDUCATION:     Exercises  - Squat with Resistance at Thighs  - 1 x daily - 7 x weekly - 2 sets - 10 reps  - Single Leg Stance  - 1 x daily - 7 x weekly - 2 sets - 10 reps - 5 sec hold  - Standing Terminal Knee Extension with Resistance  - 1 x daily - 7 x weekly - 2 sets - 10 reps - 5 sec hold  Goals:  By discharge:  1. Patient will report and demonstrate independence with current HEP  2. Patient will demonstrate AROM of the R knee 0-120 to improve their ability to ambulate, negotiate stairs, and squat without restriction  3. Patient will demonstrate the ability to ascend/descend one flight of stairs reciprocally and without an increase in pain to improve function within the home and the community  4. Patient will demonstrate gross hip and knee strength of >/= 4+/5 to improve the ability to ambulate, squat, and lift without restrictions  5. Patient will demonstrate an " increase in Lower Extremity Functional Scale score by 26 points to 60/80 to meet established MCID (baseline 3/14/24 34/80)  6. Patient will demonstrate the ability to perform 15 bodyweight squats demonstrating good hip and knee mechanics and without pain in the knee exceeding 2/10 to indicate a return to functional movements  7. Patient will demonstrate the ability to perform 15 forward and lateral lunges without pain in the knee exceeding 2/10 and while maintaining proper hip and knee mechanics  8. Patient will demonstrate improved hamstring and quadriceps strength to >/= 85% of the contralateral side measured in lbs of force to indicate improved stability of the knee during higher level recreation activities such as working out at the gym  9. Patient will report pain of 0/10 at rest, and no greater than 2/10 with any activity including but not limited to standing, walking, stairs, and squatting to improve her tolerance to all daily activities

## 2024-05-01 ENCOUNTER — TREATMENT (OUTPATIENT)
Dept: PHYSICAL THERAPY | Facility: CLINIC | Age: 39
End: 2024-05-01
Payer: COMMERCIAL

## 2024-05-01 DIAGNOSIS — M25.561 CHRONIC PAIN OF RIGHT KNEE: Primary | ICD-10-CM

## 2024-05-01 DIAGNOSIS — G89.29 CHRONIC PAIN OF RIGHT KNEE: Primary | ICD-10-CM

## 2024-05-01 PROCEDURE — 97140 MANUAL THERAPY 1/> REGIONS: CPT | Mod: GP,CQ

## 2024-05-01 PROCEDURE — 97110 THERAPEUTIC EXERCISES: CPT | Mod: GP,CQ

## 2024-05-01 PROCEDURE — 97016 VASOPNEUMATIC DEVICE THERAPY: CPT | Mod: GP,CQ

## 2024-05-01 ASSESSMENT — PAIN SCALES - GENERAL: PAINLEVEL_OUTOF10: 0 - NO PAIN

## 2024-05-01 ASSESSMENT — PAIN - FUNCTIONAL ASSESSMENT: PAIN_FUNCTIONAL_ASSESSMENT: 0-10

## 2024-05-01 NOTE — PROGRESS NOTES
Physical Therapy    Physical Therapy Treatment    Patient Name: Cassia Martin  MRN: [id number]  Today's Date: 5/1/2024    Time Calculation  Start Time: 1014  Stop Time: 1117  Time Calculation (min): 63 min    PT Therapeutic Procedures Time Entry  Manual Therapy Time Entry: 20  Therapeutic Exercise Time Entry: 22   Insurance:  UMR  $60 copay, 0% coinsurance  BMN V  PA not req  Visit: 6  POC: 8    Assessment:  Pt conitues to note some post knee pain with open chain HSC and SLS activities. Patient tolerates all treatment well this date w/o increased pain sfter tx. Glute HEP reviewed this date for glute strengthening. PT educates patient on need for continual monitoring of her symptoms while increasing her activity to avoid overuse injuries post surgery. No skin abnormality noted after modalities.  She is progressing well, and remains a good candidate for additional skilled PT.   PT Assessment  Evaluation/Treatment Tolerance: Patient tolerated treatment well    Plan: Increase glute and quad strength as able.  OP PT Plan  PT Plan: Skilled PT  Increase glute and VMO strength as able.  Continue to progress strength and ROM as tolerated    Current Problem  1. 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 Jeronimo Frazier  Past Medical History Relevant to Rehab: hx of OAs/p R knee arthroscopy and excision of giant cell tumor and chondroplasty patella on 2/15/24     Subjective    She reports some sharpness noted periodically it does buckle on occasion but not as often; ~ 1x/wk now. Still notes some pain in post knee when she walks (2/10 pain then) but notes it's a little better since last tx.    Precautions  Precautions  STEADI Fall Risk Score (The score of 4 or more indicates an increased risk of falling): 0  Precautions Comment: WBAT; hx of OA    Pain  Pain  "Assessment  Pain Assessment: 0-10  Pain Score: 0 - No pain    Objective   R  knee increased posterior jt mvmt noted vs L. PT assessed and said ligaments feel stable. Added isometric heeli dig and bridge w/hip ABD.    Pt tends to ER R LE during gait but unable to correct secondary to increased bunion pain.    Treatments:  Therapeutic exercises:  Recumbent bike SH 13 L8 x5'  nt  Elliptical L1 x5'  TM 2.6mph x~3'  HS stretch at step 3x30\" nt  Calf stretch at step IR/ ER 3x30\" ea  Heel slides without strap x15 NT  LAQ with BS 4# 2x10  HSC BTB 2x10 nt  Step ups fwd 8\" x15 ea  Mini squat x 15foam under L LE  Step downs (pain post knee) DC  SLS figure 8s with medium med ball 2x10 nt  SLS w/ cone touch 3x6  Prone SLR (knee flex and ext) 2x10ea  isometric heel dig 5\"x20  bridge w/hip ABD BLKTB 5\"x15    Manual:  STM/ TPR to R popliteus/ lat GS musc/ distal hamstring. KT \"I\" strip to popliteus (~50%).     Modalities:  Game[employer] to R knee at 34 degrees at medium compression (R LE elevated) x15'.       OP EDUCATION:     Exercises  - Squat with Resistance at Thighs  - 1 x daily - 7 x weekly - 2 sets - 10 reps  - Single Leg Stance  - 1 x daily - 7 x weekly - 2 sets - 10 reps - 5 sec hold  - Standing Terminal Knee Extension with Resistance  - 1 x daily - 7 x weekly - 2 sets - 10 reps - 5 sec hold    5/1/24: isometric heel dig and bridge w/hip ABDvs bLKTB.    Goals:  By discharge:  1. Patient will report and demonstrate independence with current HEP  2. Patient will demonstrate AROM of the R knee 0-120 to improve their ability to ambulate, negotiate stairs, and squat without restriction  3. Patient will demonstrate the ability to ascend/descend one flight of stairs reciprocally and without an increase in pain to improve function within the home and the community  4. Patient will demonstrate gross hip and knee strength of >/= 4+/5 to improve the ability to ambulate, squat, and lift without restrictions  5. Patient will demonstrate an " increase in Lower Extremity Functional Scale score by 26 points to 60/80 to meet established MCID (baseline 3/14/24 34/80)  6. Patient will demonstrate the ability to perform 15 bodyweight squats demonstrating good hip and knee mechanics and without pain in the knee exceeding 2/10 to indicate a return to functional movements  7. Patient will demonstrate the ability to perform 15 forward and lateral lunges without pain in the knee exceeding 2/10 and while maintaining proper hip and knee mechanics  8. Patient will demonstrate improved hamstring and quadriceps strength to >/= 85% of the contralateral side measured in lbs of force to indicate improved stability of the knee during higher level recreation activities such as working out at the gym  9. Patient will report pain of 0/10 at rest, and no greater than 2/10 with any activity including but not limited to standing, walking, stairs, and squatting to improve her tolerance to all daily activities

## 2024-05-08 ENCOUNTER — APPOINTMENT (OUTPATIENT)
Dept: OTOLARYNGOLOGY | Facility: CLINIC | Age: 39
End: 2024-05-08
Payer: COMMERCIAL

## 2024-05-08 ENCOUNTER — APPOINTMENT (OUTPATIENT)
Dept: PHYSICAL THERAPY | Facility: CLINIC | Age: 39
End: 2024-05-08
Payer: COMMERCIAL

## 2024-05-15 ENCOUNTER — TREATMENT (OUTPATIENT)
Dept: PHYSICAL THERAPY | Facility: CLINIC | Age: 39
End: 2024-05-15
Payer: COMMERCIAL

## 2024-05-15 DIAGNOSIS — G89.29 CHRONIC PAIN OF RIGHT KNEE: Primary | ICD-10-CM

## 2024-05-15 DIAGNOSIS — M25.561 CHRONIC PAIN OF RIGHT KNEE: Primary | ICD-10-CM

## 2024-05-15 PROCEDURE — 97110 THERAPEUTIC EXERCISES: CPT | Mod: GP | Performed by: PHYSICAL THERAPIST

## 2024-05-15 ASSESSMENT — PAIN - FUNCTIONAL ASSESSMENT: PAIN_FUNCTIONAL_ASSESSMENT: 0-10

## 2024-05-15 ASSESSMENT — PAIN SCALES - GENERAL: PAINLEVEL_OUTOF10: 0 - NO PAIN

## 2024-05-15 NOTE — PROGRESS NOTES
Physical Therapy    Physical Therapy Treatment    Patient Name: Cassia Martin  MRN: [id number]  Today's Date: 5/15/2024    Time Calculation  Start Time: 1435  Stop Time: 1515  Time Calculation (min): 40 min    PT Therapeutic Procedures Time Entry  Therapeutic Exercise Time Entry: 40   Insurance:  UMR  $60 copay, 0% coinsurance  IVET KASPER not req  Visit: 7  POC: 8    Assessment:  Patient tolerates treatment well this date, without any reports of significant increases in pain. Patient does report mild discomfort surrounding the patella when performing activities for the quadriceps, but denies any increasing pain over time and denies high pain intensity. Therapeutic exercises target gross strengthening of the lower extremities, with a focus on the quadriceps and gluteals. The patient has progressed well, but remains below her baseline function due to weakness of the lower extremities and fluctuating pain in the knee. PT will perform recheck next visit for likely extension of POC for additional guided and skilled PT as the patient returns to higher level activities        Plan:   OP PT Plan  PT Plan: Skilled PT  Increase glute and VMO strength as able.  Continue to progress strength and ROM as tolerated    Current Problem  1. 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 Jeronimo Frazier  Past Medical History Relevant to Rehab: hx of OA    Subjective    Patient reports that she is still getting some mild pain in the posterior knee when she is active like walking or doing yard work, but that it has subsided somewhat. At this time she is feeling about 70% better since starting PT. She notes that the buckling has improved, but she it does happen intermittently.     Precautions  Precautions  STEADI Fall Risk Score (The score of 4 or more indicates an increased  "risk of falling): 0  Precautions Comment: WBAT; hx of OA    Pain  Pain Assessment  Pain Assessment: 0-10  Pain Score: 0 - No pain    Objective   Increased difficulty with right resisted side stepping during resisted walking compared to left    Treatments:  Therapeutic exercises:  Upright bike SH 4 L2.5 x5'   Elliptical L1 x5' NT  TM 2.6mph x~3' NT  HS stretch at step 3x30\"   Calf stretch at step 3x30\"  Heel slides without strap x15 NT  LAQ with BS 4# 2x10 NT  HSC BTB 2x10 nt  Step ups fwd 8\" x15 ea NT  Mini squat x 15foam under L LE NT  Step downs (pain post knee) DC  SLS figure 8s with medium med ball 2x10 nt  SLS w/ cone touch 3x6 NT  Prone SLR (knee flex and ext) 2x10ea NT  isometric heel dig 5\"x20 NT  bridge w/hip ABD BLKTB 2x10  Resisted walking (heavy) 4 way x5 ea   Cybex eccentric knee extension with BS 10# 2x10  3 way heel taps 4\" x15 ea   TKE 4pl 2x10  Knee flexion stretch at step 15x5\"  Ball on wall squats 2x10  (40')        OP EDUCATION:     Exercises  - Squat with Resistance at Thighs  - 1 x daily - 7 x weekly - 2 sets - 10 reps  - Single Leg Stance  - 1 x daily - 7 x weekly - 2 sets - 10 reps - 5 sec hold  - Standing Terminal Knee Extension with Resistance  - 1 x daily - 7 x weekly - 2 sets - 10 reps - 5 sec hold    5/1/24: isometric heel dig and bridge w/hip ABDvs bLKTB.    Goals:  By discharge:  1. Patient will report and demonstrate independence with current HEP  2. Patient will demonstrate AROM of the R knee 0-120 to improve their ability to ambulate, negotiate stairs, and squat without restriction  3. Patient will demonstrate the ability to ascend/descend one flight of stairs reciprocally and without an increase in pain to improve function within the home and the community  4. Patient will demonstrate gross hip and knee strength of >/= 4+/5 to improve the ability to ambulate, squat, and lift without restrictions  5. Patient will demonstrate an increase in Lower Extremity Functional Scale score by " 26 points to 60/80 to meet established MCID (baseline 3/14/24 34/80)  6. Patient will demonstrate the ability to perform 15 bodyweight squats demonstrating good hip and knee mechanics and without pain in the knee exceeding 2/10 to indicate a return to functional movements  7. Patient will demonstrate the ability to perform 15 forward and lateral lunges without pain in the knee exceeding 2/10 and while maintaining proper hip and knee mechanics  8. Patient will demonstrate improved hamstring and quadriceps strength to >/= 85% of the contralateral side measured in lbs of force to indicate improved stability of the knee during higher level recreation activities such as working out at the gym  9. Patient will report pain of 0/10 at rest, and no greater than 2/10 with any activity including but not limited to standing, walking, stairs, and squatting to improve her tolerance to all daily activities

## 2024-05-22 ENCOUNTER — APPOINTMENT (OUTPATIENT)
Dept: PHYSICAL THERAPY | Facility: CLINIC | Age: 39
End: 2024-05-22
Payer: COMMERCIAL

## 2024-05-23 ENCOUNTER — TREATMENT (OUTPATIENT)
Dept: PHYSICAL THERAPY | Facility: CLINIC | Age: 39
End: 2024-05-23
Payer: COMMERCIAL

## 2024-05-23 DIAGNOSIS — G89.29 CHRONIC PAIN OF RIGHT KNEE: Primary | ICD-10-CM

## 2024-05-23 DIAGNOSIS — M25.561 CHRONIC PAIN OF RIGHT KNEE: Primary | ICD-10-CM

## 2024-05-23 PROCEDURE — 97110 THERAPEUTIC EXERCISES: CPT | Mod: GP | Performed by: PHYSICAL THERAPIST

## 2024-05-23 ASSESSMENT — PAIN SCALES - GENERAL: PAINLEVEL_OUTOF10: 0 - NO PAIN

## 2024-05-23 ASSESSMENT — PAIN - FUNCTIONAL ASSESSMENT: PAIN_FUNCTIONAL_ASSESSMENT: 0-10

## 2024-05-23 NOTE — PROGRESS NOTES
Physical Therapy    Physical Therapy Treatment    Patient Name: Cassia Martin  MRN: [id number]  Today's Date: 5/23/2024    Time Calculation  Start Time: 1349  Stop Time: 1429  Time Calculation (min): 40 min    PT Therapeutic Procedures Time Entry  Therapeutic Exercise Time Entry: 40   Insurance:  UMR  $60 copay, 0% coinsurance  BMN МАРИЯ KASPER not req  Visit: 9  POC: 13    Assessment:  Patient has completed 9 therapy visits up to this point, and have met 3/9 established therapy goals. The patient has progressed well, and has shown improvements in pain intensity, strength, flexibility, mobility, and ROM. At this time, the patient remains below functional baseline with intermittent pain in the knee, weakness of the lower extremities, and impaired mechanics of the lower extremity. These deficits impair the patient's ability to stand for extended periods of time, ambulate, squat, lunge, and perform recreation activities such as running and working out. They would benefit from continued skilled therapy at this time to continue to promote functional gains and a return to prior level of function. Goals updated this date to reflect patient's desire to return to recreation activities. PT recommends an additional 5 visits added to original POC including this visit for a total of 13 visits.   PT Assessment  PT Assessment Results: Decreased strength, Decreased endurance, Decreased mobility, Pain  Rehab Prognosis: Good  Evaluation/Treatment Tolerance: Patient tolerated treatment well    Plan:   OP PT Plan  Treatment/Interventions: Aquatic therapy, Cryotherapy, Dry needling, Education/ Instruction, Electrical stimulation, Hot pack, Manual therapy, Neuromuscular re-education, Self care/ home management, Taping techniques, Therapeutic activities, Therapeutic exercises, Vasopneumatic device, Ultrasound  PT Plan: Skilled PT  PT Frequency: 1 time per week  Duration: 5 additional visits to include today's visit (13 visits total)  Onset  "Date: 02/15/24  Certification Period Start Date: 05/23/24  Certification Period End Date: 08/21/24  Rehab Potential: Good  Plan of Care Agreement: Patient  Increase glute and VMO strength as able.  Continue to progress strength and ROM as tolerated    Current Problem  1. 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 Jeronimo Frazier  Past Medical History Relevant to Rehab: hx of OA    Subjective    Patient reports that the knee continues to have \"annoying pain\" on occasion. She reports that she is still struggling to lift weight at the gym as well as run. Pain is a 2/10 at worst in the last two weeks. She reports that overall she is feeling better, but would like to return to the gym for running and working out.    Precautions  Precautions  STEADI Fall Risk Score (The score of 4 or more indicates an increased risk of falling): 0  Precautions Comment: WBAT; hx of OA    Pain  Pain Assessment  Pain Assessment: 0-10  Pain Score: 0 - No pain    Objective   Knee AROM (*indicates pain): L from IE  Flexion R 127, L 126  Extension R 0, L 5 hyper    LE strength (*indicates pain): L from IE  Knee flexion R 4+/5, L 5/5  Knee extension R 4/5, L 5/5  Hip flexion R 4/5, L 4+/5  Hip abd R 4/5, L 5/5  Hip ext R 4/5, L 4+/5    Knee strength (lbs)  Unable to assess at this time    Stairs:  Pt able to ascend and descend one flight of stairs reciprocally and without increased pain  Squats: Pt is able to perform 15 3/4 squats while maintaining proper hip and knee mechanics and no increase in pain. Difficulty completing full bodyweight squat    Lunges: pt able to complete 15 fwd/lateral lunges mild increase in adduction of the femur, mild increase in pain in the knee during fwd lunges    LEFS: 53/80    Treatments:  Therapeutic exercises:  Upright bike SH 4 L2.5 x5'   Obj measures and " "goals review   Elliptical L1 x5' NT  TM 2.6mph x~3' NT  HS stretch at step 3x30\"   Calf stretch at step 3x30\"  Heel slides without strap x15 NT  LAQ with BS 4# 2x10 NT  HSC BTB 2x10 nt  Step ups fwd 8\" x15 ea NT  Mini squat x 15foam under L LE NT  Step downs (pain post knee) DC  SLS figure 8s with medium med ball 2x10 nt  SLS w/ cone touch 3x6 NT  Prone SLR (knee flex and ext) 2x10ea NT  isometric heel dig 5\"x20 NT  bridge w/hip ABD BLKTB 2x10 NT  Resisted walking (heavy) 4 way x5 ea NT  Cybex eccentric knee extension with BS 10# 2x10  3 way heel taps 6\" x15 ea   TKE 4pl 2x10  Knee flexion stretch at step 15x5\" NT  Ball on wall squats 2x10 NT  Standing lunges fwd/lat x15 ea   Leg press 1 pl 2x10  (40')        OP EDUCATION:     Exercises  - Squat with Resistance at Thighs  - 1 x daily - 7 x weekly - 2 sets - 10 reps  - Single Leg Stance  - 1 x daily - 7 x weekly - 2 sets - 10 reps - 5 sec hold  - Standing Terminal Knee Extension with Resistance  - 1 x daily - 7 x weekly - 2 sets - 10 reps - 5 sec hold    5/1/24: isometric heel dig and bridge w/hip ABDvs bLKTB.    5/23/24:   Exercises  - Prone Hip Extension with Bent Knee  - 1 x daily - 7 x weekly - 2 sets - 10 reps  - Mini Lunge  - 1 x daily - 7 x weekly - 2 sets - 10 reps  - Lateral Lunge  - 1 x daily - 7 x weekly - 2 sets - 10 reps    Goals:  By discharge:  1. Patient will report and demonstrate independence with current HEP ONGOING  2. Patient will demonstrate AROM of the R knee 0-120 to improve their ability to ambulate, negotiate stairs, and squat without restriction MET  3. Patient will demonstrate the ability to ascend/descend one flight of stairs reciprocally and without an increase in pain to improve function within the home and the community MET  4. Patient will demonstrate gross hip and knee strength of >/= 4+/5 to improve the ability to ambulate, squat, and lift without restrictions PROGRESSING  5. Patient will demonstrate an increase in Lower Extremity " Functional Scale score by 26 points to 60/80 to meet established MCID (baseline 3/14/24 34/80) PROGRESSING  6. Patient will demonstrate the ability to perform 15 bodyweight squats demonstrating good hip and knee mechanics and without pain in the knee exceeding 2/10 to indicate a return to functional movements PROGRESSING  7. Patient will demonstrate the ability to perform 15 forward and lateral lunges without pain in the knee exceeding 2/10 and while maintaining proper hip and knee mechanics PROGRESSING  8. Patient will demonstrate improved hamstring and quadriceps strength to >/= 85% of the contralateral side measured in lbs of force to indicate improved stability of the knee during higher level recreation activities such as working out at the gym UNABLE TO ASSESS  9. Patient will report pain of 0/10 at rest, and no greater than 2/10 with any activity including but not limited to standing, walking, stairs, and squatting to improve her tolerance to all daily activities MET  UPDATED 5/23/24:  10. Patient will demonstrate the ability to perform 5 minutes of light treadmill jogging while maintaining proper hip and knee mechanics and without pain exceeding 2/10 to allow for a return to recreation activities and the ability to remain active  11. Patient will report the ability to return to all desired weight lifting activities at the gym without perceived restriction due to the knee joint and without pain exceeding 2/10

## 2024-05-28 ENCOUNTER — OFFICE VISIT (OUTPATIENT)
Dept: ORTHOPEDIC SURGERY | Facility: CLINIC | Age: 39
End: 2024-05-28
Payer: COMMERCIAL

## 2024-05-28 DIAGNOSIS — G89.18 POST-OP PAIN: Primary | ICD-10-CM

## 2024-05-28 PROCEDURE — 99213 OFFICE O/P EST LOW 20 MIN: CPT | Performed by: STUDENT IN AN ORGANIZED HEALTH CARE EDUCATION/TRAINING PROGRAM

## 2024-05-28 PROCEDURE — 3008F BODY MASS INDEX DOCD: CPT | Performed by: STUDENT IN AN ORGANIZED HEALTH CARE EDUCATION/TRAINING PROGRAM

## 2024-05-28 PROCEDURE — 1036F TOBACCO NON-USER: CPT | Performed by: STUDENT IN AN ORGANIZED HEALTH CARE EDUCATION/TRAINING PROGRAM

## 2024-05-28 NOTE — PROGRESS NOTES
History of Present Illness:   Patient presents today status post right knee arthroscopy with excision of anterior mass and chondroplasty of patella and trochlea, overall doing very well.  She has some questions regarding return to activity and function as she does feel she is improving.  She has some occasional posterior tightness and pain as well that physical therapy feels could be related to tendinitis of the hamstring tendons.    Review of Systems   GENERAL: Negative for malaise, significant weight loss, fever  MUSCULOSKELETAL: see HPI  NEURO:  Negative    Physical Examination:  Right knee:  Skin healthy and intact, well-healed incisions  Mild gross swelling without ecchymosis or effusion  No varus malalignment  No valgus malalignment   ROM:  Full flexion   Full extension  No pain with internal rotation of the hip     Mild tenderness to palpation over medial joint line  No tenderness to palpation over lateral joint line  No laxity to valgus stress  No laxity to varus stress  Negative Lachman´s test  Negative anterior drawer test  Negative posterior drawer test  Negative Trudy´s test     Neurovascular exam normal distally    Imaging:  Deferred    Assessment:   Patient with ongoing right knee pain and discomfort status post arthroscopy with excision of anterior knee mass as well as chondroplasty of the patella and trochlea, doing well.    Plan:  We discussed a variety of treatment options for the patient including rest, ice, anti-inflammatories as well as continued physical therapy.  Although we do feel she was doing very well with her recovery thus far, we discussed that some of her new onset pain could be result of some of the therapeutic modalities including physical therapy.  We reviewed she could have some hamstring tendinosis and/or proximal gastrocnemius tendinosis from repetitive exercises of the right knee.  We encouraged a slow and steady approach to rebuilding strength to the lower extremities as  she has been taking a hiatus from weightlifting for some time.  Follow-up as needed    Miles Figueroa PA-C

## 2024-05-29 ENCOUNTER — TREATMENT (OUTPATIENT)
Dept: PHYSICAL THERAPY | Facility: CLINIC | Age: 39
End: 2024-05-29
Payer: COMMERCIAL

## 2024-05-29 DIAGNOSIS — M25.561 CHRONIC PAIN OF RIGHT KNEE: ICD-10-CM

## 2024-05-29 DIAGNOSIS — G89.29 CHRONIC PAIN OF RIGHT KNEE: ICD-10-CM

## 2024-05-29 PROCEDURE — 97110 THERAPEUTIC EXERCISES: CPT | Mod: GP,CQ

## 2024-05-29 ASSESSMENT — PAIN - FUNCTIONAL ASSESSMENT: PAIN_FUNCTIONAL_ASSESSMENT: 0-10

## 2024-05-29 ASSESSMENT — PAIN SCALES - GENERAL: PAINLEVEL_OUTOF10: 0 - NO PAIN

## 2024-05-29 NOTE — PROGRESS NOTES
"Physical Therapy    Physical Therapy Treatment    Patient Name: Cassia Martin  MRN: [id number]  Today's Date: 5/29/2024    Time Calculation  Start Time: 0234  Stop Time: 0315  Time Calculation (min): 41 min    PT Therapeutic Procedures Time Entry  Therapeutic Exercise Time Entry: 40   Insurance:  UMR  $60 copay, 0% coinsurance  BMN МАРИЯ KASPER not req  Visit: 10  POC: 13    Assessment:  Patient farzaneh added there ex w/o increased pain except w/ hsc at medial knee (points to pez anserine area). Point tenderness noted along pez anserine. Decreased malalignment/ muscle tightness noted after manual.  Pt encouraged to try MHP to R pez anserine. Patient would benefit from P.T. to continue to address impairments in order to improve strength, flexibility, posture, and  to decrease symptoms and increase overall function.      Plan:   OP PT Plan  PT Plan: Skilled PT  Increase glute and VMO strength as able.  Continue to progress strength and ROM as tolerated    Current Problem  1. 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 Jeronimo Frazier  Past Medical History Relevant to Rehab: hx of OA    Subjective    Patient reports that the knee continues to have \"annoying pain\" on occasion. She reports that she is still struggling to lift weight at the gym as well as run. Pain is a 1/10 at worst in the last two weeks. She reports that overall she is feeling better, but would like to return to the gym for running and working out.    Precautions  Precautions  STEADI Fall Risk Score (The score of 4 or more indicates an increased risk of falling): 0  Precautions Comment: WBAT; hx of OA    Pain  Pain Assessment  Pain Assessment: 0-10  Pain Score: 0 - No pain    Objective   Pt tends to ER her R LE during gait. Notes having a cyst mid/ medial R foot. Recommended pt not run bc of the " "compensation for the cyst.    Notes pain in pez anserine area during hsc.    Treatments:  Therapeutic exercises:  Upright bike SH 4 L3 x5'   TM 2.7-3.0mph x~3'   HS stretch at step 3x30\"   Calf stretch at step 3x30\"  LAQ with BS 4# 2x10 NT  HSC BTB 2x10 nt  Cybex knee flex 45# B x2  Step ups fwd 8\" x15 ea NT  Mini squat x 15foam under L LE NT  Lat Step [id number]  SLS figure 8s with medium med ball 3x10  SLS w/ cone touch 3x6 NT  Prone SLR (knee flex and ext) 2x10ea  isometric heel dig 5\"x20 NT  bridge w/hip ABD BLKTB 2x10 NT  Resisted walking (heavy) 4 way x5 ea NT  Cybex eccentric knee extension with BS  B up Ecc R down 25# 3x10  3 way heel taps 6\" x15 ea  nt  TKE 4pl 2x10  Ball on wall squats 2x10 NT  Standing lunges fwd/lat x15 ea   Leg press 1 pl 2x20          OP EDUCATION:     Exercises  - Squat with Resistance at Thighs  - 1 x daily - 7 x weekly - 2 sets - 10 reps  - Single Leg Stance  - 1 x daily - 7 x weekly - 2 sets - 10 reps - 5 sec hold  - Standing Terminal Knee Extension with Resistance  - 1 x daily - 7 x weekly - 2 sets - 10 reps - 5 sec hold    5/1/24: isometric heel dig and bridge w/hip ABDvs bLKTB.    5/23/24:   Exercises  - Prone Hip Extension with Bent Knee  - 1 x daily - 7 x weekly - 2 sets - 10 reps  - Mini Lunge  - 1 x daily - 7 x weekly - 2 sets - 10 reps  - Lateral Lunge  - 1 x daily - 7 x weekly - 2 sets - 10 reps    Goals:  By discharge:  1. Patient will report and demonstrate independence with current HEP ONGOING  2. Patient will demonstrate AROM of the R knee 0-120 to improve their ability to ambulate, negotiate stairs, and squat without restriction MET  3. Patient will demonstrate the ability to ascend/descend one flight of stairs reciprocally and without an increase in pain to improve function within the home and the community MET  4. Patient will demonstrate gross hip and knee strength of >/= 4+/5 to improve the ability to ambulate, squat, and lift without restrictions PROGRESSING  5. " Patient will demonstrate an increase in Lower Extremity Functional Scale score by 26 points to 60/80 to meet established MCID (baseline 3/14/24 34/80) PROGRESSING  6. Patient will demonstrate the ability to perform 15 bodyweight squats demonstrating good hip and knee mechanics and without pain in the knee exceeding 2/10 to indicate a return to functional movements PROGRESSING  7. Patient will demonstrate the ability to perform 15 forward and lateral lunges without pain in the knee exceeding 2/10 and while maintaining proper hip and knee mechanics PROGRESSING  8. Patient will demonstrate improved hamstring and quadriceps strength to >/= 85% of the contralateral side measured in lbs of force to indicate improved stability of the knee during higher level recreation activities such as working out at the gym UNABLE TO ASSESS  9. Patient will report pain of 0/10 at rest, and no greater than 2/10 with any activity including but not limited to standing, walking, stairs, and squatting to improve her tolerance to all daily activities MET  UPDATED 5/23/24:  10. Patient will demonstrate the ability to perform 5 minutes of light treadmill jogging while maintaining proper hip and knee mechanics and without pain exceeding 2/10 to allow for a return to recreation activities and the ability to remain active  11. Patient will report the ability to return to all desired weight lifting activities at the gym without perceived restriction due to the knee joint and without pain exceeding 2/10

## 2024-06-05 ENCOUNTER — TREATMENT (OUTPATIENT)
Dept: PHYSICAL THERAPY | Facility: CLINIC | Age: 39
End: 2024-06-05
Payer: COMMERCIAL

## 2024-06-05 DIAGNOSIS — M25.561 CHRONIC PAIN OF RIGHT KNEE: ICD-10-CM

## 2024-06-05 DIAGNOSIS — G89.29 CHRONIC PAIN OF RIGHT KNEE: ICD-10-CM

## 2024-06-05 PROCEDURE — 97110 THERAPEUTIC EXERCISES: CPT | Mod: GP,CQ

## 2024-06-05 ASSESSMENT — PAIN SCALES - GENERAL: PAINLEVEL_OUTOF10: 1

## 2024-06-05 ASSESSMENT — PAIN - FUNCTIONAL ASSESSMENT: PAIN_FUNCTIONAL_ASSESSMENT: 0-10

## 2024-06-05 NOTE — PROGRESS NOTES
"Physical Therapy    Physical Therapy Treatment    Patient Name: Cassia Martin  MRN: [id number]  Today's Date: 6/5/2024    Time Calculation  Start Time: 0402  Stop Time: 0445  Time Calculation (min): 43 min    PT Therapeutic Procedures Time Entry  Therapeutic Exercise Time Entry: 40   Insurance:  UMR  $60 copay, 0% coinsurance  BMN МАРИЯ KASPER not req  Visit: 11  POC: 13    Assessment:  Patient continues to note in pez anserine area periodically vincent w/HSC. Russell added ther ex w/adding increased strengthening and proprioception otherwise w/o issue.  Patient would benefit from P.T. to continue to address impairments in order to improve strength, flexibility, posture, and  to decrease symptoms and increase overall function.      Plan:   OP PT Plan  PT Plan: Skilled PT  Increase glute and VMO strength as able.  Continue to progress strength and ROM as tolerated    Current Problem  1. 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 Jeronimo Frazier  Past Medical History Relevant to Rehab: hx of OA    Subjective    Patient reports that the knee continues to have \"annoying pain\" on occasion. She reports that has started back at the gym w/some light weights and doing okay with it.  Pain is a 1-2/10 at worst recently.    Precautions  Precautions  STEADI Fall Risk Score (The score of 4 or more indicates an increased risk of falling): 0  Precautions Comment: WBAT; hx of OA    Pain  Pain Assessment  Pain Assessment: 0-10  Pain Score: 1    Objective   Pt tends to ER her R LE during gait. Notes having a cyst mid/ medial R foot. Recommended pt not run bc of the compensation for the cyst.    Notes pain in pez anserine area during hsc.    Treatments:  Therapeutic exercises:  Upright bike SH 4 L3 x5'   TM 2.7-3.0mph x~3'   HS stretch w/IR at step 3x30\"   Calf stretch at step " "3x30\"  LAQ with BS 4# 2x10 NT  Cybex knee flex 45# B x2  Step ups fwd 8\" x15 ea NT  Mini squat x 15foam under L LE NT  Lat Step downs 4\" 2x10  SLS figure 8s with medium med ball 3x10 nt  SLS w/ cone touch 3x6 NT  Prone SLR (knee flex and ext) 2x10ea 2#  bridge w/hip ABD BLKTB 2x10 NT  Resisted walking (heavy) w/ march 4 way 2\"x5 ea   Cybex eccentric knee extension with BS  B up Ecc R down 30# 3x10  3 way heel taps 6\" x15 ea  nt  Ball on wall squats 2x10 NT  lunges fwd/lat w/march on BOSU x15 ea   Leg press 1 pl 2x20 nt          OP EDUCATION:     Exercises  - Squat with Resistance at Thighs  - 1 x daily - 7 x weekly - 2 sets - 10 reps  - Single Leg Stance  - 1 x daily - 7 x weekly - 2 sets - 10 reps - 5 sec hold  - Standing Terminal Knee Extension with Resistance  - 1 x daily - 7 x weekly - 2 sets - 10 reps - 5 sec hold    5/1/24: isometric heel dig and bridge w/hip ABDvs bLKTB.    5/23/24:   Exercises  - Prone Hip Extension with Bent Knee  - 1 x daily - 7 x weekly - 2 sets - 10 reps  - Mini Lunge  - 1 x daily - 7 x weekly - 2 sets - 10 reps  - Lateral Lunge  - 1 x daily - 7 x weekly - 2 sets - 10 reps    Goals:  By discharge:  1. Patient will report and demonstrate independence with current HEP ONGOING  2. Patient will demonstrate AROM of the R knee 0-120 to improve their ability to ambulate, negotiate stairs, and squat without restriction MET  3. Patient will demonstrate the ability to ascend/descend one flight of stairs reciprocally and without an increase in pain to improve function within the home and the community MET  4. Patient will demonstrate gross hip and knee strength of >/= 4+/5 to improve the ability to ambulate, squat, and lift without restrictions PROGRESSING  5. Patient will demonstrate an increase in Lower Extremity Functional Scale score by 26 points to 60/80 to meet established MCID (baseline 3/14/24 34/80) PROGRESSING  6. Patient will demonstrate the ability to perform 15 bodyweight squats " demonstrating good hip and knee mechanics and without pain in the knee exceeding 2/10 to indicate a return to functional movements PROGRESSING  7. Patient will demonstrate the ability to perform 15 forward and lateral lunges without pain in the knee exceeding 2/10 and while maintaining proper hip and knee mechanics PROGRESSING  8. Patient will demonstrate improved hamstring and quadriceps strength to >/= 85% of the contralateral side measured in lbs of force to indicate improved stability of the knee during higher level recreation activities such as working out at the gym UNABLE TO ASSESS  9. Patient will report pain of 0/10 at rest, and no greater than 2/10 with any activity including but not limited to standing, walking, stairs, and squatting to improve her tolerance to all daily activities MET  UPDATED 5/23/24:  10. Patient will demonstrate the ability to perform 5 minutes of light treadmill jogging while maintaining proper hip and knee mechanics and without pain exceeding 2/10 to allow for a return to recreation activities and the ability to remain active  11. Patient will report the ability to return to all desired weight lifting activities at the gym without perceived restriction due to the knee joint and without pain exceeding 2/10

## 2024-06-12 ENCOUNTER — TREATMENT (OUTPATIENT)
Dept: PHYSICAL THERAPY | Facility: CLINIC | Age: 39
End: 2024-06-12
Payer: COMMERCIAL

## 2024-06-12 DIAGNOSIS — M25.561 CHRONIC PAIN OF RIGHT KNEE: Primary | ICD-10-CM

## 2024-06-12 DIAGNOSIS — G89.29 CHRONIC PAIN OF RIGHT KNEE: Primary | ICD-10-CM

## 2024-06-12 PROCEDURE — 97110 THERAPEUTIC EXERCISES: CPT | Mod: GP,CQ

## 2024-06-12 ASSESSMENT — PAIN SCALES - GENERAL: PAINLEVEL_OUTOF10: 0 - NO PAIN

## 2024-06-12 ASSESSMENT — PAIN - FUNCTIONAL ASSESSMENT: PAIN_FUNCTIONAL_ASSESSMENT: 0-10

## 2024-06-12 NOTE — PROGRESS NOTES
"Physical Therapy    Physical Therapy Treatment    Patient Name: Cassia Martin  MRN: [id number]  Today's Date: 6/12/2024    Time Calculation  Start Time: 0452  Stop Time: 0535  Time Calculation (min): 43 min    PT Therapeutic Procedures Time Entry  Therapeutic Exercise Time Entry: 40   Insurance:  [employer]R  $60 copay, 0% coinsurance  BMN МАРИЯ KASPER not req  Visit: 12  POC: 13    Assessment:  Patient's R bunion and cyst seem to be the most limiting factor in activity recently. Russell added ther ex w/adding increased strengthening and proprioception otherwise w/o issue. To ice at home per pt. Patient would benefit from P.T. to continue to address impairments in order to improve strength, flexibility, posture, and  to decrease symptoms and increase overall function.      Plan: Pt wants to run still. To get a pain cream for her cyst. Watch pt run if she's had the cream on.  OP PT Plan  PT Plan: Skilled PTRe-nataliya LANG[facility].    Current Problem  1. 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 Jeronimo Frazier  Past Medical History Relevant to Rehab: hx of OA    Subjective    She reports doing a walk/ run a little and doing okay with it.  Pain is a 1-2/10 at worst recently. Notes some \"crunching feeling\" in her knee periodically still.    Precautions  Precautions  STEADI Fall Risk Score (The score of 4 or more indicates an increased risk of falling): 0  Precautions Comment: WBAT; hx of OA    Pain  Pain Assessment  Pain Assessment: 0-10  Pain Score: 0 - No pain    Objective   Pt tends to ER her R LE during gait. Notes having a cyst mid/ medial R foot. Recommended pt not run bc of the compensation for the cyst.    R knee still pretty swollen. Pt education to ice her knee to decrease swelling which may help w/ \"crunching feeling\".    Treatments:  Therapeutic exercises:  Upright " "bike SH 4 L3 x5' nt  Elliptical L3 >1.5mph x5'  TM 2.7-3.0mph x~3' nt  HS stretch w/IR at step 3x30\"   Calf stretch at step 3x30\"  LAQ with BS 4# 2x10 NT  Cybex knee flex 45# B x2 nt  Step ups fwd 8\" x15 ea NT  Mini squat x 15 foam under L LE NT  Lat Step downs 4\" 2x10  SLS figure 8s with medium med ball 3x10 nt  Prone SLR (knee flex and ext) 2x15ea 2#  Resisted walking (heavy) w/ march x5 reps  4 way 2\"x5 ea   Cybex eccentric knee extension with BS  B up Ecc R down 30# 3x10  3 way heel taps 6\" x15 ea  nt  wall squats 3x30\"  lunges fwd/lat w/march on BOSU x15 ea   Leg press 3 pl 2x15  Walking lunges 2x50'  Includes KT \"I\" strip to R patella to increase space relief.        OP EDUCATION:     Exercises  - Squat with Resistance at Thighs  - 1 x daily - 7 x weekly - 2 sets - 10 reps  - Single Leg Stance  - 1 x daily - 7 x weekly - 2 sets - 10 reps - 5 sec hold  - Standing Terminal Knee Extension with Resistance  - 1 x daily - 7 x weekly - 2 sets - 10 reps - 5 sec hold    5/1/24: isometric heel dig and bridge w/hip ABDvs bLKTB.    5/23/24:   Exercises  - Prone Hip Extension with Bent Knee  - 1 x daily - 7 x weekly - 2 sets - 10 reps  - Mini Lunge  - 1 x daily - 7 x weekly - 2 sets - 10 reps  - Lateral Lunge  - 1 x daily - 7 x weekly - 2 sets - 10 reps    Goals:  By discharge:  1. Patient will report and demonstrate independence with current HEP ONGOING  2. Patient will demonstrate AROM of the R knee 0-120 to improve their ability to ambulate, negotiate stairs, and squat without restriction MET  3. Patient will demonstrate the ability to ascend/descend one flight of stairs reciprocally and without an increase in pain to improve function within the home and the community MET  4. Patient will demonstrate gross hip and knee strength of >/= 4+/5 to improve the ability to ambulate, squat, and lift without restrictions PROGRESSING  5. Patient will demonstrate an increase in Lower Extremity Functional Scale score by 26 points to " 60/80 to meet established MCID (baseline 3/14/24 34/80) PROGRESSING  6. Patient will demonstrate the ability to perform 15 bodyweight squats demonstrating good hip and knee mechanics and without pain in the knee exceeding 2/10 to indicate a return to functional movements PROGRESSING  7. Patient will demonstrate the ability to perform 15 forward and lateral lunges without pain in the knee exceeding 2/10 and while maintaining proper hip and knee mechanics PROGRESSING  8. Patient will demonstrate improved hamstring and quadriceps strength to >/= 85% of the contralateral side measured in lbs of force to indicate improved stability of the knee during higher level recreation activities such as working out at the gym UNABLE TO ASSESS  9. Patient will report pain of 0/10 at rest, and no greater than 2/10 with any activity including but not limited to standing, walking, stairs, and squatting to improve her tolerance to all daily activities MET  UPDATED 5/23/24:  10. Patient will demonstrate the ability to perform 5 minutes of light treadmill jogging while maintaining proper hip and knee mechanics and without pain exceeding 2/10 to allow for a return to recreation activities and the ability to remain active  11. Patient will report the ability to return to all desired weight lifting activities at the gym without perceived restriction due to the knee joint and without pain exceeding 2/10

## 2024-06-26 ENCOUNTER — TREATMENT (OUTPATIENT)
Dept: PHYSICAL THERAPY | Facility: CLINIC | Age: 39
End: 2024-06-26
Payer: COMMERCIAL

## 2024-06-26 DIAGNOSIS — M25.561 CHRONIC PAIN OF RIGHT KNEE: Primary | ICD-10-CM

## 2024-06-26 DIAGNOSIS — G89.29 CHRONIC PAIN OF RIGHT KNEE: Primary | ICD-10-CM

## 2024-06-26 PROCEDURE — 97110 THERAPEUTIC EXERCISES: CPT | Mod: GP | Performed by: PHYSICAL THERAPIST

## 2024-06-26 ASSESSMENT — PAIN - FUNCTIONAL ASSESSMENT: PAIN_FUNCTIONAL_ASSESSMENT: 0-10

## 2024-06-26 ASSESSMENT — PAIN SCALES - GENERAL: PAINLEVEL_OUTOF10: 1

## 2024-06-26 NOTE — PROGRESS NOTES
Physical Therapy    Physical Therapy Discharge    Patient Name: Cassia Martin  MRN: [id number]  Today's Date: 6/26/2024    Time Calculation  Start Time: 1735  Stop Time: 1815  Time Calculation (min): 40 min    PT Therapeutic Procedures Time Entry  Therapeutic Exercise Time Entry: 40   Insurance:  UMR  $60 copay, 0% coinsurance  BMN МАРИЯ KASPER not req  Visit: 13  POC: 13    Assessment:  Patient has completed 13 therapy visits up to this point, and have met 8/11 established therapy goals. The patient has progressed well, and has shown improvements in pain intensity, strength, flexibility, and overall mobility. At this time, the patient remains below functional baseline with intermittent pain in the knee, difficulty running, and mild weakness of the lower extremities. Running not assessed this date due to increased pain in the foot rather than the knee. Patient's progress towards remaining goals have begun to plateau, and PT anticipates potential progression in these areas with adherence to HEP. Patient is discharged from formal physical therapy at this time, with instructions to continue with HEP, and follow up with physician should their status change.      Plan:   OP PT Plan  PT Plan: No Additional PT interventions required at this time    Current Problem  1. 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 Jeronimo Frazier  Past Medical History Relevant to Rehab: hx of OA    Subjective    Patient reports that her knee has been feeling good for the most part. She notes that she still hasn't gotten back to running because of her foot pain. She does still get pain in the inside of the knee during her fast walks of 3/10. She has returned to doing activities at the gym without significant restriction and pain is a 1-2/10.    Precautions  Precautions  Precautions  "Comment: WBAT; hx of OA    Pain  Pain Assessment  Pain Assessment: 0-10  0-10 (Numeric) Pain Score: 1    Objective   Knee AROM (*indicates pain): L from IE  Flexion R 126, L 126  Extension R 0, L 5 hyper    LE strength (*indicates pain): L from IE  Knee flexion R 4+/5, L 5/5  Knee extension R 4+/5, L 5/5  Hip flexion R 4+/5, L 4+/5  Hip abd R 4+/5, L 5/5  Hip ext R 4+/5, L 4+/5    Knee strength (lbs)  Flexion R 40.7, L 40.5 (100.4%)  Extension R 61.3, L 55.8 (109.6%)    Stairs:  Pt able to ascend and descend one flight of stairs reciprocally and without increased pain  Squats: Pt is able to perform 15 squats while maintaining proper hip and knee mechanics and pain of 1/10   Lunges: pt able to complete 15 fwd/lateral lunges with good mechanics of the hip and knee and with pain 2/10    LEFS: 53/80    Treatments:  Therapeutic exercises:  Obj measures and goals review  Upright bike SH 4 L3 x4'   Elliptical L3 >1.5mph x5' NT  TM 2.7-3.0mph x~3' nt  HS stretch w/IR at step 3x30\"   Calf stretch at step 3x30\"  Lat Step downs 4\" 2x10   SLS figure 8s with medium med ball 3x10 nt  Prone SLR (knee flex and ext) 2x15ea 2# NT  Resisted walking (heavy) w/ march x5 reps  4 way 2\"x5 ea   Leg press 3 pl 2x15 NT  Walking lunges 2x50' NT  BOSU step ups fwd/lateral x15 ea       OP EDUCATION:     Exercises  - Squat with Resistance at Thighs  - 1 x daily - 7 x weekly - 2 sets - 10 reps  - Single Leg Stance  - 1 x daily - 7 x weekly - 2 sets - 10 reps - 5 sec hold  - Standing Terminal Knee Extension with Resistance  - 1 x daily - 7 x weekly - 2 sets - 10 reps - 5 sec hold    5/1/24: isometric heel dig and bridge w/hip ABDvs bLKTB.    5/23/24:   Exercises  - Prone Hip Extension with Bent Knee  - 1 x daily - 7 x weekly - 2 sets - 10 reps  - Mini Lunge  - 1 x daily - 7 x weekly - 2 sets - 10 reps  - Lateral Lunge  - 1 x daily - 7 x weekly - 2 sets - 10 reps    Goals:  By discharge:  1. Patient will report and demonstrate independence with " current HEP MET  2. Patient will demonstrate AROM of the R knee 0-120 to improve their ability to ambulate, negotiate stairs, and squat without restriction MET  3. Patient will demonstrate the ability to ascend/descend one flight of stairs reciprocally and without an increase in pain to improve function within the home and the community MET  4. Patient will demonstrate gross hip and knee strength of >/= 4+/5 to improve the ability to ambulate, squat, and lift without restrictions MET  5. Patient will demonstrate an increase in Lower Extremity Functional Scale score by 26 points to 60/80 to meet established MCID (baseline 3/14/24 34/80) PARTIALLY MET  6. Patient will demonstrate the ability to perform 15 bodyweight squats demonstrating good hip and knee mechanics and without pain in the knee exceeding 2/10 to indicate a return to functional movements MET  7. Patient will demonstrate the ability to perform 15 forward and lateral lunges without pain in the knee exceeding 2/10 and while maintaining proper hip and knee mechanics PROGRESSING  8. Patient will demonstrate improved hamstring and quadriceps strength to >/= 85% of the contralateral side measured in lbs of force to indicate improved stability of the knee during higher level recreation activities such as working out at the gym MET  9. Patient will report pain of 0/10 at rest, and no greater than 2/10 with any activity including but not limited to standing, walking, stairs, and squatting to improve her tolerance to all daily activities PARTIALLY MET  UPDATED 5/23/24:  10. Patient will demonstrate the ability to perform 5 minutes of light treadmill jogging while maintaining proper hip and knee mechanics and without pain exceeding 2/10 to allow for a return to recreation activities and the ability to remain active NOT ASSESSED  11. Patient will report the ability to return to all desired weight lifting activities at the gym without perceived restriction due to the  knee joint and without pain exceeding 2/10 MET

## 2024-07-11 ENCOUNTER — OFFICE VISIT (OUTPATIENT)
Dept: PRIMARY CARE | Facility: CLINIC | Age: 39
End: 2024-07-11
Payer: COMMERCIAL

## 2024-07-11 VITALS
OXYGEN SATURATION: 98 % | RESPIRATION RATE: 16 BRPM | SYSTOLIC BLOOD PRESSURE: 110 MMHG | HEART RATE: 77 BPM | WEIGHT: 216.8 LBS | HEIGHT: 62 IN | BODY MASS INDEX: 39.9 KG/M2 | DIASTOLIC BLOOD PRESSURE: 72 MMHG | TEMPERATURE: 97.1 F

## 2024-07-11 DIAGNOSIS — R41.0 CONFUSION: ICD-10-CM

## 2024-07-11 DIAGNOSIS — F41.9 ANXIETY: Primary | ICD-10-CM

## 2024-07-11 DIAGNOSIS — E66.01 CLASS 2 SEVERE OBESITY DUE TO EXCESS CALORIES WITH SERIOUS COMORBIDITY AND BODY MASS INDEX (BMI) OF 39.0 TO 39.9 IN ADULT (MULTI): ICD-10-CM

## 2024-07-11 PROBLEM — E66.812 CLASS 2 SEVERE OBESITY DUE TO EXCESS CALORIES WITH SERIOUS COMORBIDITY AND BODY MASS INDEX (BMI) OF 39.0 TO 39.9 IN ADULT: Status: ACTIVE | Noted: 2024-07-11

## 2024-07-11 PROCEDURE — 1036F TOBACCO NON-USER: CPT | Performed by: FAMILY MEDICINE

## 2024-07-11 PROCEDURE — 3008F BODY MASS INDEX DOCD: CPT | Performed by: FAMILY MEDICINE

## 2024-07-11 PROCEDURE — 99214 OFFICE O/P EST MOD 30 MIN: CPT | Performed by: FAMILY MEDICINE

## 2024-07-11 RX ORDER — CLONIDINE HYDROCHLORIDE 0.1 MG/1
1 TABLET ORAL 2 TIMES DAILY PRN
COMMUNITY
Start: 2024-01-09

## 2024-07-11 ASSESSMENT — PATIENT HEALTH QUESTIONNAIRE - PHQ9
2. FEELING DOWN, DEPRESSED OR HOPELESS: NOT AT ALL
1. LITTLE INTEREST OR PLEASURE IN DOING THINGS: NOT AT ALL
SUM OF ALL RESPONSES TO PHQ9 QUESTIONS 1 AND 2: 0

## 2024-07-11 NOTE — PROGRESS NOTES
"Subjective   Patient ID: Cassia Martin is a 39 y.o. female who presents for Altered Mental Status and Vertigo.  HPI  pt is being seen for Mental status change.   ongoing for 1 month ago  other symptoms includes confusion, increased short term memory loss,   Doing odd things that don't make sense  states that her family has notice how forgetful the pt has been     Switched to 20 MG Prozac in February. In the past was on Wellbutrin, and Strattera. She didn't like the side effects.  Patient has noticed more confusion over the last month. States her mother has noticed changes as well.  Saw [facility] NP yesterday, and states she wanted to increase the Prozac to 40 MG, but didn't.     Pt states that she had Vertigo in April 2024 and lasted 1 month long    Has not taken any Meclizine.  Has not been taking any supplements.     Has been told she snores at night. Doesn't wake up during the night.  States she doesn't wake up feeling \"fresh\". Has not fallen asleep while driving.   Can fall asleep if she sits to relax after getting home from work.  Admits to drinking 7 beers almost nightly.  No family history of sleep apnea that she knows of.      No other concern /question     Review of systems  ; Patient seen today for exam denies any problems with headaches or vision, denies any shortness of breath chest pain nausea or vomiting, no black stool no blood in the stool no heartburn type symptoms denies any problems with constipation or diarrhea, and no dysuria-type symptoms    The patient's allergies medications were reviewed with them today    The patient's social family and surgical history or also reviewed here today, along with her past medical history.     Objective     Alert and active in  no acute distress  HEENT TMs clear oropharynx negative nares clear no drainage noted neck supple  With no adenopathy   Heart regular rate and rhythm without murmur and no carotid bruits  Lungs- clear to auscultation bilaterally, no wheeze " "or rhonchi noted  Thyroid -negative masses or nodularity  Abdomen- soft times four quadrants , bowel sounds positive no masses or organomegaly, negative tenderness guarding or rebound  Neurological exam unremarkable- DTRs in upper and lower extremities within normal limits.   skin -no lesions noted////further neurological exam strength 5/5 in upper extremities no nystagmus no other jai organic issues or neurological issues      /72 (BP Location: Left arm, Patient Position: Sitting, BP Cuff Size: Large adult)   Pulse 77   Temp 36.2 °C (97.1 °F) (Temporal)   Resp 16   Ht 1.575 m (5' 2\")   Wt 98.3 kg (216 lb 12.8 oz)   SpO2 98%   BMI 39.65 kg/m²     Allergies   Allergen Reactions    Bupropion Hcl Dermatitis, Hives and Itching    Cefdinir Diarrhea and Hives    Cyclosporine Hives    Phentermine Cardiac arrhythmia/arrest    Strattera [Atomoxetine] Unknown    Latex Rash    Quinolones Rash    Sulfa (Sulfonamide Antibiotics) Hives and Rash       Assessment/Plan   Problem List Items Addressed This Visit       Anxiety - Primary    Relevant Orders    Referral to [facility]    BMI 39.0-39.9,adult    Class 2 severe obesity due to excess calories with serious comorbidity and body mass index (BMI) of 39.0 to 39.9 in adult (Multi)     Other Visit Diagnoses       Confusion        Relevant Orders    CT head wo IV contrast        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 Treatment options reviewed.  BP controlled.  Anxiety & confusion discussed.  Will arrange for a CT of the head to be " done.  Symptoms most likely related to Fluoxetine. OK to take Vyvanse with this if [facility] starts.  Follow-up with [facility] as planned.  Will refer to Jumana Griffin for counseling.  Importance of healthy diet and regular exercise regimen discussed.    Continue and take your medications as prescribed.    Health Maintenance issues discussed.    We will contact you with any test results ordered. If you do not hear from us, please contact.    If anything worsens or changes please call us at once. Follow up in the office as planned.      Scribe Attestation  By signing my name below, IJose Luis, Harris   attest that this documentation has been prepared under the direction and in the presence of Vincent Travis DO.

## 2024-07-18 ENCOUNTER — APPOINTMENT (OUTPATIENT)
Dept: PRIMARY CARE | Facility: CLINIC | Age: 39
End: 2024-07-18
Payer: COMMERCIAL

## 2024-07-22 ENCOUNTER — APPOINTMENT (OUTPATIENT)
Dept: ALLERGY | Facility: CLINIC | Age: 39
End: 2024-07-22
Payer: COMMERCIAL

## 2024-07-22 ENCOUNTER — SOCIAL WORK (OUTPATIENT)
Dept: PRIMARY CARE | Facility: CLINIC | Age: 39
End: 2024-07-22
Payer: COMMERCIAL

## 2024-07-22 ASSESSMENT — ANXIETY QUESTIONNAIRES
2. NOT BEING ABLE TO STOP OR CONTROL WORRYING: MORE THAN HALF THE DAYS
7. FEELING AFRAID AS IF SOMETHING AWFUL MIGHT HAPPEN: SEVERAL DAYS
1. FEELING NERVOUS, ANXIOUS, OR ON EDGE: SEVERAL DAYS
5. BEING SO RESTLESS THAT IT IS HARD TO SIT STILL: SEVERAL DAYS
GAD7 TOTAL SCORE: 9
3. WORRYING TOO MUCH ABOUT DIFFERENT THINGS: MORE THAN HALF THE DAYS
6. BECOMING EASILY ANNOYED OR IRRITABLE: SEVERAL DAYS
IF YOU CHECKED OFF ANY PROBLEMS ON THIS QUESTIONNAIRE, HOW DIFFICULT HAVE THESE PROBLEMS MADE IT FOR YOU TO DO YOUR WORK, TAKE CARE OF THINGS AT HOME, OR GET ALONG WITH OTHER PEOPLE: SOMEWHAT DIFFICULT
4. TROUBLE RELAXING: SEVERAL DAYS

## 2024-07-22 ASSESSMENT — PATIENT HEALTH QUESTIONNAIRE - PHQ9
9. THOUGHTS THAT YOU WOULD BE BETTER OFF DEAD, OR OF HURTING YOURSELF: NOT AT ALL
3. TROUBLE FALLING OR STAYING ASLEEP: NEARLY EVERY DAY
7. TROUBLE CONCENTRATING ON THINGS, SUCH AS READING THE NEWSPAPER OR WATCHING TELEVISION: NEARLY EVERY DAY
SUM OF ALL RESPONSES TO PHQ9 QUESTIONS 1 & 2: 3
5. POOR APPETITE OR OVEREATING: NEARLY EVERY DAY
10. IF YOU CHECKED OFF ANY PROBLEMS, HOW DIFFICULT HAVE THESE PROBLEMS MADE IT FOR YOU TO DO YOUR WORK, TAKE CARE OF THINGS AT HOME, OR GET ALONG WITH OTHER PEOPLE: VERY DIFFICULT
2. FEELING DOWN, DEPRESSED OR HOPELESS: SEVERAL DAYS
1. LITTLE INTEREST OR PLEASURE IN DOING THINGS: MORE THAN HALF THE DAYS
SUM OF ALL RESPONSES TO PHQ QUESTIONS 1-9: 18
6. FEELING BAD ABOUT YOURSELF - OR THAT YOU ARE A FAILURE OR HAVE LET YOURSELF OR YOUR FAMILY DOWN: MORE THAN HALF THE DAYS
8. MOVING OR SPEAKING SO SLOWLY THAT OTHER PEOPLE COULD HAVE NOTICED. OR THE OPPOSITE, BEING SO FIGETY OR RESTLESS THAT YOU HAVE BEEN MOVING AROUND A LOT MORE THAN USUAL: SEVERAL DAYS
4. FEELING TIRED OR HAVING LITTLE ENERGY: NEARLY EVERY DAY

## 2024-07-22 NOTE — PROGRESS NOTES
"Collaborative Care (CoCM) Initial Assessment    Session Time 80 minutes  Start: 10:17 AM  End: 11:37 AM     Collaborative Care program information (including case discussion with psychiatry, involvement of [facility] and billing when applicable) was provided and discussed with the patient. Patient Indicated understanding and agreed to proceed.   Confirm: Yes    Patient Health Questionnaire-9 Score: 18 (7/22/2024 11:53 AM)  DOMINIC-7 Total Score: 9 (7/22/2024 11:54 AM)    Reason for Visit / Chief Complaint  Chief Complaint   Patient presents with    Anxiety    Depression   Pt reports she is coming in for COCM for \"help with coping skills\".  \"Depression funk\" ;\"current meds helping with anxiety been good but not covering the depression 100%\".  \"Ideas for overeating (emotional eater- binges here and there):.  No hx of purging reported.  Over-eating started in middle school per pt.      Accompanied by: Self  Guardian Status: Self  Caregiver Status:     Review of Symptoms    Sleep   Average Hours Sleep in/Night: 6 hours Trouble falling asleep and exhausted everyday.  \"I can get 8 hours of sleep and still feel like haven't slept\".  \"Unsure what is keeping her from falling asleep\".   Prepares Self for Sleep at Time:  9:30 PM; fall asleep at 11:00-12:30 AM.  Usual Wake up Time: 7:00 AM \"Sometimes wake up at 3:00 AM due to son\".  \"Son is 5 and special needs (hx of terrible sleeper).\"  No OTC meds for sleep.    Sleep Symptoms: difficulty falling asleep, interrupted sleep, nightmares, snores / been told snores, daytime sleepiness, vivid dreams, and awakes feeling exhausted  Sleep Hygiene: good sleep hygiene, avoids alcohol 4-6H/before bed, avoids caffeine 4-6H/before bed, TV in bedroom, uses electronics/phone, and consistent sleep/wake time    Mood   Symptom Onset/Duration: Last 2-4 months\"Two months ago super apathetic about things; lack of care about things\".  2016 is when depression started.  \"I was running and training for " "marathons\".  \"The more I ran and something flipped mental and political events at that time\".  \"I would cry when running\".  \"Started therapy for a year then went on Lexapro\".  \"Closeted in high school so supressed feelings\".  \"Mom never had weight issues and she would make comments about my weight\".    Current Sx: little interest/pleasure doing things, feeling down, feeling depressed, trouble falling asleep, trouble staying asleep, overeating, feeling bad about self, feeling like failure, feeling let others down, trouble concentrating, anger/irritability, and low self-esteem  Triggers: situation(s), people, and event(s)  Past Sx: feeling down, feeling depressed, overeating, weight gain, feeling bad about self, crying spells, anger/irritability, low self-esteem, and loneliness    Anxiety   Symptom Onset/Duration:  2016 \"Anxiety coincided with depression and used to get really bad panic attacks before Lexapro and 1-2 before Lexapro and unsure of triggers\".    Current Sx: feeling nervous/anxious/on edge, difficulty stopping/controlling worry, worrying too much, trouble relaxing, easily annoyed/irritable, afraid something awful may happen, negative thought of self, racing thoughts, unhelpful thinking patterns, and social anxiety  Panic / Somatic Sx: rapid heartbeat\"No panic attacks anymore but always anxious\".  \"Anxiety mostly about son\".  \"January started having 90 minutes meltdowns to the point of throw things and hit us\".  \"He is on the list to be evaluated for autism and has fetal alcohol syndrome and we adopted him\".  \"He is going to K this year and nervous\".     Triggers: situation(s), people, and event(s)  Past Sx: feeling nervous/anxious/on edge, difficulty stopping/controlling worry, worrying too much, negative thought of self, rumination, and panic attack(s)    Self-Esteem / Self-Image   Self Esteem Rating (1-10 Scale, 10 being high): 1  Self-Esteem / Self Image Sx: sensitive to criticism, struggles with " "confidence, feels like a failure, judges self, and negative self-talk  Decline in self esteem weight gain started in 2019.  Lost a lot of weight doing marathons prior to this. Self esteem at 7 when running.  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  Can't run now and needs to have surgery on foot.  Running was my outlet. Hx of binge eating.  Pt has a younger brother who lives across the street with 3 kids.  Relationship good with brother and his kids.    Appetite   Description of Overall Appetite: average appetite \"binging and not hungry\".  Pt reports she started listening to self help book on overeating and listening to the hunger cues.  \"Started with  a month ago and do 3 meals a day and some snacks\".    Eating Behaviors: binge eats and eats until feels uncomfortably full  Concerns with appetite: worries about weight/body shape and desire to be thinner    Anger / Irritability  Symptoms of Anger / Irritability:  \"Since on prozac ok; able now to stop and think before I react\".  \"Dad was very angry when I was growing up\".  \"Pt trying to get out of that cycle\".      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  \"Work from home and he goes to  3 days a week\".  Pt reported her brother is a  and has depression and at one time thoughts of suicide.  Current Symptoms Related to Traumatic Experience: No sx endorsed on trauma screen.  Triggers: situation(s), place(s), people, and event(s)  \"Dad had a lot of anger\".  \"Dad found his dad's body at 19 and " "thinks he had a lot of depression that impacted his anger\".      Grief / Loss / Adjustment   Symptom Onset/Duration: more than 1 yearDog  in   Current Sx: depressed mood, feeling emotionally overwhelmed, and lack of routine  Factors of Grief / Loss / Adjustment: new job/position, loss of job, loss of pet, and becoming a parent    Hallucinations / Delusions   Hallucinations & Delusions Experienced: none, denied \"Sometimes thinks see things in corner of eye\".    Learning Concerns / Memory   Learning Concerns & Sx: none, denied\"Procrastinate until night before\".  ADHD never diagnosed and never affected me in school years that I had to be on something.  Memory Concerns & Sx: increased forgetfulness and increased confusion/disorientation Pt reports this has improved since alcohol cessation.    Functional impairment   Impacting ADL's: no impairment   Impacting IADL's: No impairment  Impacting Ability : to focus/concentrate, to sleep, and in connecting with others  \"I try to go out with son and take places but would rather stay home\".  Associated Medical Concerns   Potential Associated Factors: None  Nausea for post op since surgery.  Potential upcoming foot surgery.  Headaches 3 times a week and takes Motrin; thinks clenches jaws when sleeping.    Comprehensive Behavioral Health History     Medications  Current Mental Health Medications:   Prozac / fluoxetine; Dose: 20 mg; Side effects: None, denied  NP wanted her to start Clonidine for anxiety and ADHD for focus but Dr. LI unsure.  Pt has the prescription but took it once a couple months ago and had a terrible migraine and hasn't taken it since, she reports.  Pt taking Prozac in AM.      Past Mental Health Medications:   Lexapro / escitalopram; Dose: 10 mg; Side effects: weight gain  Srattera / atomoxetine; Dose: unknown; Side effects: increased anxiousness  Wellbutrin / bupropion XL; Dose: 150 mg; Side effects: allergy-hives  Viibryd; Dose: 20 mg; Side effects: " "diarrhea    Concerns / challenges / barriers with taking medications? No concerns    Open to medication recommendations from consulting psychiatrist? Yes    Do you ever forget to take your medication? No  If yes, how often?     Mental Health Treatment History  Mental Health Treatment: individual therapy  Reason/When/Where/Outcome: Psych and [facility] ([id number]) but she left the practice.  Reach [facility] 2024 but didn't like the therapist; currently seeing Rosa Pineda CNP since 10/2023 for med mgmt, ADHD, depression/anxiety.      Risk History  Suicidal Thoughts/Method/Intent/Plan: does not want to die and \"None since Nov 2023 \"  Suicide Attempts/Preparations: None, denied \"Thoughts of how I might do it but no preparations\".  Number of Suicide Attempts: 0  Access to Firearms/Lethal Means: Very suicidal prior to Lexapro. 2017 had suicidal thoughts.  access to firearms/lethal means, stored in locked cabinet, and wife has fingerprint safe that only wife has.  Pt has no idea or interest in getting into this.  Non-Suicidal Self Injury: None, denied  Last [place] Risk Score:  High Risk  Protective Factors: strong protective factors, hopefulness/future orientation, generative employment, sense of responsibility towards family, child-related concerns - children <17 y/o, and positive family relationships  \"Last time had thoughts of suicide in Nov 2023 back with different med changes at the time\".  \"I felt off at the time\".  \"I had no plans but thoughts were coming\". \" would never do anything bc of my son\".    Violence: None, denied  \"On Stratera and make me not feel like myself- going through a lot of med changes\".  \"Last time prior to this was 7 years ago prior to the Lexapro\".  Homicidal Thoughts/Method/Plan/Intent: None, denied  Homicidal Attempts/Preparations: None, denied  Number of Attempts: 0      Substance Use History    Substances    Social History     Substance and Sexual Activity   Alcohol Use Yes    " "Alcohol/week: 1.0 standard drink of alcohol    Types: 1 Glasses of wine per week     Social History     Substance and Sexual Activity   Drug Use Never       Substance Current Use   Marijuana Minimal use occasional 5 mg gummy   Alcohol No Since 11 days sober from alcohol               Addiction Treatment     Types of Addiction Treatment:  None  Currently Sober? No     Status/Length of Sobriety: recently sober from alcohol    Family History    Mental Health / Conditions    Family Member Condition / Diagnosis Medications / Side Effects   Father Depression None/Unknown   Brother Depression Celexa / citalopram; Dose: unknown; Side effects: N/A\"in college depressed.  \"He is on Trazadone for help with sleep and depression\".  \"He had SI last year had gun to head\".  \"Pt didn't know at the time\".                 Substance Use    Family Member Substance Current Use   Father Alcohol \"used to drink 12-15 beers everyday- he had heart attack in 2019\".  \"He cut back significantly\". Excessive use   Mother Alcohol Occasional use                 History of Suicide    Family Member Details   N/A            Social History    Housing   Living Situation: lives with wife and son  Safe Housing Conditions / Feels Safe in Home: Yes    Employment  Current Employment: full-time  Current Concerns/Challenges: No  \"Forgetfulness but getting better since stopped drinking\".  \"Some difficulty with focusing at work and has to force self to sit and focus\".    Income   Current Concerns/Challenges: No  Receive Benefits/Assistance: No    Education   Status / Level of Education: Bachelor's degree    Legal   Legal Considerations: None, denied    Relationships   S/O:  Wife  Parents/Guardian: Both parents  Siblings: Brother  Friends: Yes, \"neighbor and two good friends from high school and get together once a month\".  Other:        Active Duty? No  Are you a ? No  Branch Area:   Were you in combat?   Discharge Status:   Do you receive VA " "Benefits:     Sexuality / Gender   Concerns with Sexuality/Gender: None, denied  Sexual Orientation: lesbian    Preferred Gender Pronouns / Identity: She/her/hers    Transportation   Transportation Concerns: active 's license and has vehicle    Shinto/ Spirituality   Are you Episcopal or Spiritual: Yes  Shinto / Practice: Hoahaoism\"Grew up Hoahaoism and now Christian\"; \"sending son to [employer] this fall in [place] bc they have a great special needs school\".    Spiritual Practice: seeks meaning/purpose in life and practices gratitude    Coping / Strengths / Supports   Coping:  \"video games and gym lift weights, power walking\".  \"Not same rush as running but still walking\".    Strengths: athletic, good listener, independent, and loving  Supports: Spouse  \"Wife and parents\".  \"Better now- in good place now with parents\".  \"Dad has mellowed out\".      Abuse History  Physical Abuse: No  Sexual Abuse: No  Verbal / Emotional Abuse / Bullying (+Cyber): \"Mom put her in weight watchers in High School\".  \"We've discussed and didn't help my self esteem but I know she was trying to help me\".  Financial Abuse: No  Domestic Violence: No    Assessment Summary  / Plan    Assessment Summary: Pt is a 39 year old white female who presents to COCM with wanting to learn new coping skills to manage anxiety, depression and overeating behaviors.  Pt currently has med mgmt by psychiatric CNP but was interested in COC after PCP suggested.  Pt dealing with stressors related to recent alcohol cessation; 5 year old son's special needs and will be starting K at [employer] in the fall, knee surgery in Feb 2024 which has placed limitations on her exercises and potential need for upcoming foot surgery, pt's brother has MH concerns.    What do you want to work on/get out of collaborative care? \"When feeling down or sad how to cope\".  Pt wants to \"try to avoid turning to food for pleasure and do other things instead\".  \"Interested in " "Medication recommendations\".     Plan:   Psych consult - ongoing, bi-weekly, Gmelolp-Wvrjqzsh-Zkrgqtyl interventions, provide psycho-education, provide appropriate tx referrals, and provide appropriate resources    Follow up in 2 weeks (on 8/5/2024).    Provisional Findings / Impressions  Primary: Generalized Anxiety Disorder    Secondary: Major Depressive Disorder, recurrent, moderate episode    Goals:  Pt to learn healthy coping skills to manage depression and anxiety sx.    "

## 2024-07-25 ENCOUNTER — TELEPHONE (OUTPATIENT)
Dept: PRIMARY CARE | Facility: CLINIC | Age: 39
End: 2024-07-25
Payer: COMMERCIAL

## 2024-07-25 ENCOUNTER — DOCUMENTATION (OUTPATIENT)
Dept: BEHAVIORAL HEALTH | Facility: HOSPITAL | Age: 39
End: 2024-07-25
Payer: COMMERCIAL

## 2024-07-25 ENCOUNTER — HOSPITAL ENCOUNTER (OUTPATIENT)
Dept: RADIOLOGY | Facility: CLINIC | Age: 39
Discharge: HOME | End: 2024-07-25
Payer: COMMERCIAL

## 2024-07-25 DIAGNOSIS — R41.0 CONFUSION: ICD-10-CM

## 2024-07-25 PROCEDURE — 70450 CT HEAD/BRAIN W/O DYE: CPT

## 2024-07-25 PROCEDURE — 70450 CT HEAD/BRAIN W/O DYE: CPT | Performed by: RADIOLOGY

## 2024-07-25 NOTE — PROGRESS NOTES
[facility] Psychiatry Consult Note     Cassia Martin is a 39 y.o., referred to Collaborative Care for symptoms of depression and anxiety. I have reviewed the patient with the behavioral health manager and reviewed the patient's electronic record.    Suffering with anxiety, depression, panic attacks, new diagnosis of ADHD, with complex med history. Medications are currently helping with panic attacks but not yet with depression. Side effects - weight gain    Recommendations:   - It is likely in patient's best interest to continue psychiatric care with her current provider, who knows her medication history well and is currently making medication interventions, given her complex symptoms and medication history.   - [facility] to help patient locate long-term therapist, rather than therapy through collaborative care.        Patient Health Questionnaire-9 Score: 18 (7/22/2024 11:53 AM)  DOMINIC-7 Total Score: 9 (7/22/2024 11:54 AM)      The above treatment considerations and suggestions are based on consultations with the patient's care manager and a review of information available in the electronic medical record. I have not personally examined the patient. All recommendations should be implemented with consideration of the patient's relevant prior history and current clinical status. Please feel free to call me with any questions about the care of this patient. I can easily be reached through [employer] or at [email or web address]

## 2024-07-31 ENCOUNTER — DOCUMENTATION (OUTPATIENT)
Dept: PRIMARY CARE | Facility: CLINIC | Age: 39
End: 2024-07-31
Payer: COMMERCIAL

## 2024-07-31 DIAGNOSIS — F32.A DEPRESSION, UNSPECIFIED DEPRESSION TYPE: Primary | ICD-10-CM

## 2024-07-31 DIAGNOSIS — F41.1 GENERALIZED ANXIETY DISORDER: ICD-10-CM

## 2024-07-31 PROCEDURE — 99492 1ST PSYC COLLAB CARE MGMT: CPT | Performed by: FAMILY MEDICINE

## 2024-07-31 PROCEDURE — 99494 1ST/SBSQ PSYC COLLAB CARE: CPT | Performed by: FAMILY MEDICINE

## 2024-08-05 ENCOUNTER — APPOINTMENT (OUTPATIENT)
Dept: PRIMARY CARE | Facility: CLINIC | Age: 39
End: 2024-08-05
Payer: COMMERCIAL

## 2024-08-08 ENCOUNTER — APPOINTMENT (OUTPATIENT)
Dept: PRIMARY CARE | Facility: CLINIC | Age: 39
End: 2024-08-08
Payer: COMMERCIAL

## 2024-08-08 ASSESSMENT — PATIENT HEALTH QUESTIONNAIRE - PHQ9
3. TROUBLE FALLING OR STAYING ASLEEP: NEARLY EVERY DAY
SUM OF ALL RESPONSES TO PHQ QUESTIONS 1-9: 16
6. FEELING BAD ABOUT YOURSELF - OR THAT YOU ARE A FAILURE OR HAVE LET YOURSELF OR YOUR FAMILY DOWN: NEARLY EVERY DAY
7. TROUBLE CONCENTRATING ON THINGS, SUCH AS READING THE NEWSPAPER OR WATCHING TELEVISION: NEARLY EVERY DAY
8. MOVING OR SPEAKING SO SLOWLY THAT OTHER PEOPLE COULD HAVE NOTICED. OR THE OPPOSITE, BEING SO FIGETY OR RESTLESS THAT YOU HAVE BEEN MOVING AROUND A LOT MORE THAN USUAL: SEVERAL DAYS
SUM OF ALL RESPONSES TO PHQ9 QUESTIONS 1 & 2: 2
10. IF YOU CHECKED OFF ANY PROBLEMS, HOW DIFFICULT HAVE THESE PROBLEMS MADE IT FOR YOU TO DO YOUR WORK, TAKE CARE OF THINGS AT HOME, OR GET ALONG WITH OTHER PEOPLE: SOMEWHAT DIFFICULT
4. FEELING TIRED OR HAVING LITTLE ENERGY: NEARLY EVERY DAY
1. LITTLE INTEREST OR PLEASURE IN DOING THINGS: SEVERAL DAYS
9. THOUGHTS THAT YOU WOULD BE BETTER OFF DEAD, OR OF HURTING YOURSELF: NOT AT ALL
5. POOR APPETITE OR OVEREATING: SEVERAL DAYS
2. FEELING DOWN, DEPRESSED OR HOPELESS: SEVERAL DAYS

## 2024-08-08 ASSESSMENT — ANXIETY QUESTIONNAIRES
4. TROUBLE RELAXING: SEVERAL DAYS
IF YOU CHECKED OFF ANY PROBLEMS ON THIS QUESTIONNAIRE, HOW DIFFICULT HAVE THESE PROBLEMS MADE IT FOR YOU TO DO YOUR WORK, TAKE CARE OF THINGS AT HOME, OR GET ALONG WITH OTHER PEOPLE: SOMEWHAT DIFFICULT
7. FEELING AFRAID AS IF SOMETHING AWFUL MIGHT HAPPEN: SEVERAL DAYS
5. BEING SO RESTLESS THAT IT IS HARD TO SIT STILL: SEVERAL DAYS
3. WORRYING TOO MUCH ABOUT DIFFERENT THINGS: MORE THAN HALF THE DAYS
GAD7 TOTAL SCORE: 10
1. FEELING NERVOUS, ANXIOUS, OR ON EDGE: MORE THAN HALF THE DAYS
2. NOT BEING ABLE TO STOP OR CONTROL WORRYING: SEVERAL DAYS
6. BECOMING EASILY ANNOYED OR IRRITABLE: MORE THAN HALF THE DAYS

## 2024-08-08 NOTE — PROGRESS NOTES
Collaborative Care (CoCM)  Progress Note    Type of Interaction: Virtual    Start Time: 9:02 AM    End Time: 9:36 AM    Appointment: Scheduled    Reason for Visit:   Chief Complaint   Patient presents with    Anxiety    Depression    Review of psych consult recommendations and options future MH tx.    Interval History / Patient Symptoms:   Pt reports she maintains her medication regimen and doing well.  Pt maintains no alcohol.  Pt reported a recent disagreement/argument with her father that is upsetting to her.    Patient Health Questionnaire-9 Score: 16 (8/8/2024  9:06 AM)  DOMINIC-7 Total Score: 10 (8/8/2024  9:08 AM)        Interventions Provided: Psychoeducation, Acceptance & Commitment Therapy, Motivational Interviewing, Treatment Planning, and Provided patient with recommendations following psych consult; identified pt's preference for ongoing care.  Processed pt's recent interactions with family that she tends to continue to go over in her mind.  Identified coping skills of journaling as another option to express her valid feelings.        Progress Made: None    Response to Intervention: Pt discussed that she would like to have PCP manage her medications and continue with COCM services.  Pt discussed some anxious rumination when sleeping.  Pt does snore but no gasping for air.  Pt reported she can sleep through the night but still feeling tired during the day.  Pt discussed struggles with her parents that are bothersome to her.        Plan: Discuss pt preference for COCM with consulting psychiatrist.      Patient Instructions   Maintain exercise for improved wellbeing.    Follow Up / Next Appointment: Next appointment: 08/22/24

## 2024-08-15 ENCOUNTER — DOCUMENTATION (OUTPATIENT)
Dept: BEHAVIORAL HEALTH | Facility: HOSPITAL | Age: 39
End: 2024-08-15
Payer: COMMERCIAL

## 2024-08-15 NOTE — PROGRESS NOTES
[facility] Psychiatry Consult Note     Cassia Martin is a 39 y.o., referred to Collaborative Care for symptoms of depression and anxiety. I have reviewed the patient with the behavioral health manager and reviewed the patient's electronic record.    Patient has decided that she would rather consolidate all her care in one place, and therefore will be having her PCP prescribe psychiatric medications rather than her psychiatric prescriber.    Not using alcohol - memory issues have improved    Sleeping better, but still somewhat tired during the day    Recommendations:   - Could continue prozac at current dose and add Wellbutrin  mg every morning, for depressed mood, energy, and ADHD.  - However, if this makes the patient more anxious, would discontinue wellbutrin XL and instead increase prozac to 40 mg daily.    Patient Health Questionnaire-9 Score: 16 (8/8/2024  9:06 AM)  DOMINIC-7 Total Score: 10 (8/8/2024  9:08 AM)    The above treatment considerations and suggestions are based on consultations with the patient's care manager and a review of information available in the electronic medical record. I have not personally examined the patient. All recommendations should be implemented with consideration of the patient's relevant prior history and current clinical status. Please feel free to call me with any questions about the care of this patient. I can easily be reached through [facility] or at [email or web address]

## 2024-08-16 ENCOUNTER — DOCUMENTATION (OUTPATIENT)
Dept: BEHAVIORAL HEALTH | Facility: HOSPITAL | Age: 39
End: 2024-08-16
Payer: COMMERCIAL

## 2024-08-16 ENCOUNTER — DOCUMENTATION (OUTPATIENT)
Dept: PRIMARY CARE | Facility: CLINIC | Age: 39
End: 2024-08-16
Payer: COMMERCIAL

## 2024-08-16 NOTE — PROGRESS NOTES
[facility] called and spoke to patient on 8/16 about recent meeting with consulting psychiatrist based on sx pt reported to [facility] in last appointment.  [facility] notified patient that there has been a recommendation made by the psychiatrist and that, if she is interested, she can reach out to PCP to discuss.  Pt agreed she planned to think about recommendations and talk to PCP.  Pt aware she will need to discontinue psych services by outside psychiatry provider while in COCM.  Pt is agreeable and stated this is her wish.

## 2024-08-16 NOTE — PROGRESS NOTES
Patient has had hives from Wellbutrin in the past, so should not take this medication.  I therefore recommend that she either increase prozac to 40 mg daily, or start Pristiq 50 mg daily for better anxiety and depression control, if this has not been tried in the past.    Machelle Bautista MD  Psychiatry  [facility]  [email or web address]

## 2024-08-20 ENCOUNTER — TELEPHONE (OUTPATIENT)
Dept: PRIMARY CARE | Facility: CLINIC | Age: 39
End: 2024-08-20
Payer: COMMERCIAL

## 2024-08-20 NOTE — TELEPHONE ENCOUNTER
Jumana Haddad Dr. told me to reach out to you. Currently I’m on 20mg of fluoxetine. I’ve been feeling fine on it, but some days I am melancholic with little motivation and trouble focusing on anything.  Psychiatrist suggested I can either go up to 40mg of fluoxetine or add Pristiq.     Jumana wanted me to ask you and what you think. Do you think I should stick to the 20mg? Or what are your thoughts

## 2024-08-22 ENCOUNTER — APPOINTMENT (OUTPATIENT)
Dept: PRIMARY CARE | Facility: CLINIC | Age: 39
End: 2024-08-22
Payer: COMMERCIAL

## 2024-08-22 ASSESSMENT — PATIENT HEALTH QUESTIONNAIRE - PHQ9
SUM OF ALL RESPONSES TO PHQ QUESTIONS 1-9: 11
3. TROUBLE FALLING OR STAYING ASLEEP: MORE THAN HALF THE DAYS
4. FEELING TIRED OR HAVING LITTLE ENERGY: SEVERAL DAYS
2. FEELING DOWN, DEPRESSED OR HOPELESS: SEVERAL DAYS
1. LITTLE INTEREST OR PLEASURE IN DOING THINGS: SEVERAL DAYS
6. FEELING BAD ABOUT YOURSELF - OR THAT YOU ARE A FAILURE OR HAVE LET YOURSELF OR YOUR FAMILY DOWN: SEVERAL DAYS
9. THOUGHTS THAT YOU WOULD BE BETTER OFF DEAD, OR OF HURTING YOURSELF: NOT AT ALL
SUM OF ALL RESPONSES TO PHQ9 QUESTIONS 1 & 2: 2
5. POOR APPETITE OR OVEREATING: SEVERAL DAYS
8. MOVING OR SPEAKING SO SLOWLY THAT OTHER PEOPLE COULD HAVE NOTICED. OR THE OPPOSITE, BEING SO FIGETY OR RESTLESS THAT YOU HAVE BEEN MOVING AROUND A LOT MORE THAN USUAL: SEVERAL DAYS
7. TROUBLE CONCENTRATING ON THINGS, SUCH AS READING THE NEWSPAPER OR WATCHING TELEVISION: NEARLY EVERY DAY
10. IF YOU CHECKED OFF ANY PROBLEMS, HOW DIFFICULT HAVE THESE PROBLEMS MADE IT FOR YOU TO DO YOUR WORK, TAKE CARE OF THINGS AT HOME, OR GET ALONG WITH OTHER PEOPLE: VERY DIFFICULT

## 2024-08-22 ASSESSMENT — ANXIETY QUESTIONNAIRES
7. FEELING AFRAID AS IF SOMETHING AWFUL MIGHT HAPPEN: SEVERAL DAYS
6. BECOMING EASILY ANNOYED OR IRRITABLE: SEVERAL DAYS
2. NOT BEING ABLE TO STOP OR CONTROL WORRYING: SEVERAL DAYS
5. BEING SO RESTLESS THAT IT IS HARD TO SIT STILL: SEVERAL DAYS
1. FEELING NERVOUS, ANXIOUS, OR ON EDGE: SEVERAL DAYS
3. WORRYING TOO MUCH ABOUT DIFFERENT THINGS: MORE THAN HALF THE DAYS
4. TROUBLE RELAXING: MORE THAN HALF THE DAYS
IF YOU CHECKED OFF ANY PROBLEMS ON THIS QUESTIONNAIRE, HOW DIFFICULT HAVE THESE PROBLEMS MADE IT FOR YOU TO DO YOUR WORK, TAKE CARE OF THINGS AT HOME, OR GET ALONG WITH OTHER PEOPLE: VERY DIFFICULT
GAD7 TOTAL SCORE: 9

## 2024-08-22 NOTE — PROGRESS NOTES
"Collaborative Care (CoCM)  Progress Note    Type of Interaction: In Office    Start Time: 11:30 AM    End Time: 12:07 PM    Appointment: Scheduled    Reason for Visit:   Chief Complaint   Patient presents with    Anxiety    Depression    Review of psych consult recommendations and sleep hygiene/tx planning.    Interval History / Patient Symptoms:   Pt reported she plans to start the increase in Prozac over the weekend.  Pt reported continued improvement with her memory and not using alcohol.  She stated she feels better without it but does sometimes wish she could have a glass of wine but reminds self of how she will feel after.  Pt reports she sleeps well until son wakes her at 3 AM and stays up for a few hours then pt has trouble going back to sleep.    Patient Health Questionnaire-9 Score: 11 (8/22/2024 12:08 PM)  DOMINIC-7 Total Score: 9 (8/22/2024 12:12 PM)    Interventions Provided: Psychoeducation, Problem Solving Treatment, Motivational Interviewing, Develop Coping Strategies, Mindfulness, and Explored barriers for good quality sleep and used PST and MI to discuss possible solutions to barriers and steps pt plans to take to address these.  Facilitated anxiety reduction intervention of Leaves on a Stream and to initiate scheduled \"worry time\" in her day.  Discussed stressors at home, normalized her feelings, and discussion about importance to give herself credit for all she accomplishes in a day.    Progress Made: Minimum    Response to Intervention: Pt discussed overall sleeping well at night until son wakes her at 3 AM and usually is up a few hours.  Pt discussed things they have tried to keep asleep and offered other suggestions.  Pt seemed willing to consider.  Pt plans to discuss these concerns at upcoming appointment with child's developmental pediatrician.  Pt reports they do have son linked with resources.  Pt agreeable to setting \"worry time\" and practicing leaves on a stream meditation.    Plan: Explore " ACO for RD support; use worry time and leaves on a stream, reach out to supports for child on help with son's sleep.    Patient Instructions   Please utilize coping skills interventions sent to email.    Follow Up / Next Appointment: Next appointment: 09/05/24

## 2024-08-23 DIAGNOSIS — F41.1 GENERALIZED ANXIETY DISORDER: ICD-10-CM

## 2024-08-23 NOTE — TELEPHONE ENCOUNTER
Hi Dr LI,     I will try 40mg of fluoxetine. I have the 20mg caps now, I can try two of those a day to see how it goes

## 2024-08-27 RX ORDER — FLUOXETINE HYDROCHLORIDE 40 MG/1
40 CAPSULE ORAL DAILY
Qty: 30 CAPSULE | Refills: 2 | Status: SHIPPED | OUTPATIENT
Start: 2024-08-27

## 2024-08-28 ENCOUNTER — TELEPHONE (OUTPATIENT)
Dept: PRIMARY CARE | Facility: CLINIC | Age: 39
End: 2024-08-28
Payer: COMMERCIAL

## 2024-08-28 DIAGNOSIS — E66.09 CLASS 2 OBESITY DUE TO EXCESS CALORIES WITHOUT SERIOUS COMORBIDITY WITH BODY MASS INDEX (BMI) OF 36.0 TO 36.9 IN ADULT: Primary | ICD-10-CM

## 2024-08-28 NOTE — TELEPHONE ENCOUNTER
----- Message from Vincent Travis sent at 8/27/2024  1:04 PM EDT -----  Regarding: RE: Referral to Population Health  Please refer to dietitian for high BMI thank you  ----- Message -----  From: QIAN Jorge  Sent: 8/27/2024  11:44 AM EDT  To: Vincent Travis DO; Malathi Nicolas MA  Subject: Referral to Population Health                    Good morning,    This patient expressed interest in working with a Registered Dietitian for weight mgmt.    has a Population Health department that offers such services.  Would you be able to make a referral to this department so they can reach out to her?    Thank you,  Jumana

## 2024-08-30 ENCOUNTER — DOCUMENTATION (OUTPATIENT)
Dept: PRIMARY CARE | Facility: CLINIC | Age: 39
End: 2024-08-30
Payer: COMMERCIAL

## 2024-08-30 DIAGNOSIS — F32.A DEPRESSION, UNSPECIFIED DEPRESSION TYPE: ICD-10-CM

## 2024-08-30 DIAGNOSIS — F41.9 ANXIETY: Primary | ICD-10-CM

## 2024-08-30 PROCEDURE — 99494 1ST/SBSQ PSYC COLLAB CARE: CPT | Performed by: FAMILY MEDICINE

## 2024-08-30 PROCEDURE — 99493 SBSQ PSYC COLLAB CARE MGMT: CPT | Performed by: FAMILY MEDICINE

## 2024-09-05 ENCOUNTER — APPOINTMENT (OUTPATIENT)
Dept: PRIMARY CARE | Facility: CLINIC | Age: 39
End: 2024-09-05
Payer: COMMERCIAL

## 2024-09-05 ASSESSMENT — ANXIETY QUESTIONNAIRES
7. FEELING AFRAID AS IF SOMETHING AWFUL MIGHT HAPPEN: NOT AT ALL
2. NOT BEING ABLE TO STOP OR CONTROL WORRYING: SEVERAL DAYS
6. BECOMING EASILY ANNOYED OR IRRITABLE: NOT AT ALL
GAD7 TOTAL SCORE: 6
4. TROUBLE RELAXING: NOT AT ALL
IF YOU CHECKED OFF ANY PROBLEMS ON THIS QUESTIONNAIRE, HOW DIFFICULT HAVE THESE PROBLEMS MADE IT FOR YOU TO DO YOUR WORK, TAKE CARE OF THINGS AT HOME, OR GET ALONG WITH OTHER PEOPLE: SOMEWHAT DIFFICULT
3. WORRYING TOO MUCH ABOUT DIFFERENT THINGS: SEVERAL DAYS
1. FEELING NERVOUS, ANXIOUS, OR ON EDGE: MORE THAN HALF THE DAYS
5. BEING SO RESTLESS THAT IT IS HARD TO SIT STILL: MORE THAN HALF THE DAYS

## 2024-09-05 ASSESSMENT — PATIENT HEALTH QUESTIONNAIRE - PHQ9
1. LITTLE INTEREST OR PLEASURE IN DOING THINGS: NOT AT ALL
8. MOVING OR SPEAKING SO SLOWLY THAT OTHER PEOPLE COULD HAVE NOTICED. OR THE OPPOSITE, BEING SO FIGETY OR RESTLESS THAT YOU HAVE BEEN MOVING AROUND A LOT MORE THAN USUAL: SEVERAL DAYS
3. TROUBLE FALLING OR STAYING ASLEEP: MORE THAN HALF THE DAYS
SUM OF ALL RESPONSES TO PHQ QUESTIONS 1-9: 9
4. FEELING TIRED OR HAVING LITTLE ENERGY: MORE THAN HALF THE DAYS
SUM OF ALL RESPONSES TO PHQ9 QUESTIONS 1 & 2: 0
5. POOR APPETITE OR OVEREATING: MORE THAN HALF THE DAYS
9. THOUGHTS THAT YOU WOULD BE BETTER OFF DEAD, OR OF HURTING YOURSELF: NOT AT ALL
6. FEELING BAD ABOUT YOURSELF - OR THAT YOU ARE A FAILURE OR HAVE LET YOURSELF OR YOUR FAMILY DOWN: NOT AT ALL
2. FEELING DOWN, DEPRESSED OR HOPELESS: NOT AT ALL
7. TROUBLE CONCENTRATING ON THINGS, SUCH AS READING THE NEWSPAPER OR WATCHING TELEVISION: MORE THAN HALF THE DAYS
10. IF YOU CHECKED OFF ANY PROBLEMS, HOW DIFFICULT HAVE THESE PROBLEMS MADE IT FOR YOU TO DO YOUR WORK, TAKE CARE OF THINGS AT HOME, OR GET ALONG WITH OTHER PEOPLE: SOMEWHAT DIFFICULT

## 2024-09-05 NOTE — PROGRESS NOTES
"Collaborative Care (CoCM)  Progress Note    Type of Interaction: In Office    Start Time: 9:20 AM    End Time: 9:56 AM    Appointment: Scheduled    Reason for Visit:   Chief Complaint   Patient presents with    Depression    Anxiety    Review of medication and tx goals.    Interval History / Patient Symptoms:   Pt reports she has been doing better.  Pt has been taking 40 mg Prozac since 8-23 and reports body adjusting well.  Pt has been going on walks, meeting with a  and has RD appointment coming up next month for help with weight loss.      Patient Health Questionnaire-9 Score: 9 (9/5/2024 10:37 AM)  DOMINIC-7 Total Score: 6 (9/5/2024 10:38 AM)    Interventions Provided: Psychoeducation, Behavioral Activation, Acceptance & Commitment Therapy, Motivational Interviewing, Develop Coping Strategies, Mindfulness, and CBT related to weight loss efforts and trying to do small things daily that she can stick with starting out.  Provided psychoeducation on coping skills to practice over next few weeks.  Processed recent struggle with her mom and utilized ACT to assist her with coping with negative tx from others.  Encouraged pt to put her mental energy into things she does have ability to change/modify towards things that bring value to her life.      Progress Made: Moderate    Response to Intervention: Pt receptive to discussion today about healthy lifestyle and has been maintain daily walks.  Pt discussed stressor with her mom and states she \"fixates\" on these things.  Pt seemed receptive to idea to write a letter to express her frustrations.  Discussion about rumination that zaps her energy and other things she can do when noticing she is \"fixated\".      Plan: Utilize coping skill handout; work outside of office, maintain daily walks and two times a week with .    Patient Instructions   Consider working in local [employer] to help with mood and focus on work days.  Activities to promote healthy lifestyle- " continue daily walks, lessen sweets.  Practice coping skills activities from handout provided.    Follow Up / Next Appointment: Next appointment: 09/19/24

## 2024-09-19 ENCOUNTER — APPOINTMENT (OUTPATIENT)
Dept: PRIMARY CARE | Facility: CLINIC | Age: 39
End: 2024-09-19
Payer: COMMERCIAL

## 2024-09-19 ASSESSMENT — PATIENT HEALTH QUESTIONNAIRE - PHQ9
4. FEELING TIRED OR HAVING LITTLE ENERGY: MORE THAN HALF THE DAYS
3. TROUBLE FALLING OR STAYING ASLEEP: MORE THAN HALF THE DAYS
7. TROUBLE CONCENTRATING ON THINGS, SUCH AS READING THE NEWSPAPER OR WATCHING TELEVISION: MORE THAN HALF THE DAYS
5. POOR APPETITE OR OVEREATING: SEVERAL DAYS
1. LITTLE INTEREST OR PLEASURE IN DOING THINGS: SEVERAL DAYS
9. THOUGHTS THAT YOU WOULD BE BETTER OFF DEAD, OR OF HURTING YOURSELF: NOT AT ALL
SUM OF ALL RESPONSES TO PHQ9 QUESTIONS 1 & 2: 1
10. IF YOU CHECKED OFF ANY PROBLEMS, HOW DIFFICULT HAVE THESE PROBLEMS MADE IT FOR YOU TO DO YOUR WORK, TAKE CARE OF THINGS AT HOME, OR GET ALONG WITH OTHER PEOPLE: SOMEWHAT DIFFICULT
6. FEELING BAD ABOUT YOURSELF - OR THAT YOU ARE A FAILURE OR HAVE LET YOURSELF OR YOUR FAMILY DOWN: NOT AT ALL
SUM OF ALL RESPONSES TO PHQ QUESTIONS 1-9: 10
2. FEELING DOWN, DEPRESSED OR HOPELESS: NOT AT ALL
8. MOVING OR SPEAKING SO SLOWLY THAT OTHER PEOPLE COULD HAVE NOTICED. OR THE OPPOSITE, BEING SO FIGETY OR RESTLESS THAT YOU HAVE BEEN MOVING AROUND A LOT MORE THAN USUAL: MORE THAN HALF THE DAYS

## 2024-09-19 ASSESSMENT — ANXIETY QUESTIONNAIRES
3. WORRYING TOO MUCH ABOUT DIFFERENT THINGS: SEVERAL DAYS
7. FEELING AFRAID AS IF SOMETHING AWFUL MIGHT HAPPEN: NOT AT ALL
6. BECOMING EASILY ANNOYED OR IRRITABLE: NOT AT ALL
IF YOU CHECKED OFF ANY PROBLEMS ON THIS QUESTIONNAIRE, HOW DIFFICULT HAVE THESE PROBLEMS MADE IT FOR YOU TO DO YOUR WORK, TAKE CARE OF THINGS AT HOME, OR GET ALONG WITH OTHER PEOPLE: VERY DIFFICULT
5. BEING SO RESTLESS THAT IT IS HARD TO SIT STILL: MORE THAN HALF THE DAYS
4. TROUBLE RELAXING: SEVERAL DAYS
GAD7 TOTAL SCORE: 5
2. NOT BEING ABLE TO STOP OR CONTROL WORRYING: NOT AT ALL
1. FEELING NERVOUS, ANXIOUS, OR ON EDGE: SEVERAL DAYS

## 2024-09-19 NOTE — PROGRESS NOTES
"Collaborative Care (CoCM)  Progress Note    Type of Interaction: In Office    Start Time: 9:15 AM    End Time: 10:00 AM    Appointment: Scheduled    Reason for Visit:   Chief Complaint   Patient presents with    Depression    Anxiety    Review of psych consult recommendations, current sx and \"increase in fidgeting since increase in Fluoxetine to 40 mg since 8/23. Review of progress made toward tx goal.    Interval History / Patient Symptoms:   Pt reports doing \"pretty good\" overall.  Pt reports her mood seems to have improved but noticing an increase in her fidgeting and troubles focusing since increase in Fluoxetine.  Pt reports continued poor sleep and lack of energy during the day.    Patient Health Questionnaire-9 Score: 10 (9/19/2024 10:05 AM)  DOMINIC-7 Total Score: 5 (9/19/2024 10:05 AM)    Interventions Provided: Psychoeducation, Behavioral Activation, Motivational Interviewing, Develop Coping Strategies, and Provided psychoeducation on intentional positive thinking/journaling to reduce rumination.  Provided psychoeducation on poor sleep and impact on all the bothersome symptoms she identifies; identified potential solutions to try to improve family's disruptive sleep patterns.  Encouraged pt to maintain frequent meditation even it is hard for her to sit longer than a few minutes and that this skill to can improve over time and practice.      Progress Made: Minimum    Response to Intervention: Pt reported that she would work on discussion from session about taking time to focus on more positive things in her life.  Pt stated she would consider techniques to help with son's sleep so whole family can have improved sleep.  Pt agreed to work on journaling and meditation sent to her email.    Plan: Consult with Dr. Bautista on \"fidgeting\".  Pt to utilize coping skills discussed in session for improved mood.    Patient Instructions   Practice gratitude journal entries and insight timer meditations daily.    Follow Up / " Next Appointment: Next appointment: 10/03/24

## 2024-09-20 ENCOUNTER — DOCUMENTATION (OUTPATIENT)
Dept: BEHAVIORAL HEALTH | Facility: HOSPITAL | Age: 39
End: 2024-09-20
Payer: COMMERCIAL

## 2024-09-20 NOTE — PROGRESS NOTES
[facility] Psychiatry Consult Note     Cassia Martin is a 39 y.o., referred to Collaborative Care for symptoms of depression and anxiety. I have reviewed the patient with the behavioral health manager and reviewed the patient's electronic record.    Feeling better on increased prozac (increased 8/23/24 to 40 mg daily) but feeling more fidgety.     Recommendations:   - Would monitor for another 4-6 weeks, as this side effect often remits over time.  - However, if it does not remit and is too uncomfortable, would decrease prozac to 30 mg daily, or then to 20 mg daily, until this side effect goes away.  - If need to decrease prozac, could add pristiq 25 mg daily for one week, then increase to 50 mg daily, for better effect.     Patient Health Questionnaire-9 Score: 10 (9/19/2024 10:05 AM)  DOMINIC-7 Total Score: 5 (9/19/2024 10:05 AM)      The above treatment considerations and suggestions are based on consultations with the patient's care manager and a review of information available in the electronic medical record. I have not personally examined the patient. All recommendations should be implemented with consideration of the patient's relevant prior history and current clinical status. Please feel free to call me with any questions about the care of this patient. I can easily be reached through [employer] or at [email or web address]

## 2024-09-30 PROCEDURE — 99493 SBSQ PSYC COLLAB CARE MGMT: CPT | Performed by: FAMILY MEDICINE

## 2024-09-30 PROCEDURE — 99494 1ST/SBSQ PSYC COLLAB CARE: CPT | Performed by: FAMILY MEDICINE

## 2024-10-02 ENCOUNTER — APPOINTMENT (OUTPATIENT)
Dept: NUTRITION | Facility: CLINIC | Age: 39
End: 2024-10-02
Payer: COMMERCIAL

## 2024-10-02 ENCOUNTER — DOCUMENTATION (OUTPATIENT)
Dept: PRIMARY CARE | Facility: CLINIC | Age: 39
End: 2024-10-02

## 2024-10-02 VITALS — BODY MASS INDEX: 40.3 KG/M2 | HEIGHT: 62 IN | WEIGHT: 219 LBS

## 2024-10-02 DIAGNOSIS — F32.A DEPRESSION, UNSPECIFIED DEPRESSION TYPE: ICD-10-CM

## 2024-10-02 DIAGNOSIS — F41.9 ANXIETY: Primary | ICD-10-CM

## 2024-10-02 DIAGNOSIS — E66.812 CLASS 2 OBESITY DUE TO EXCESS CALORIES WITHOUT SERIOUS COMORBIDITY WITH BODY MASS INDEX (BMI) OF 36.0 TO 36.9 IN ADULT: ICD-10-CM

## 2024-10-02 DIAGNOSIS — E66.09 CLASS 2 OBESITY DUE TO EXCESS CALORIES WITHOUT SERIOUS COMORBIDITY WITH BODY MASS INDEX (BMI) OF 36.0 TO 36.9 IN ADULT: ICD-10-CM

## 2024-10-02 NOTE — PROGRESS NOTES
"Reason for Nutrition Visit:  Pt is a 39 y.o. female being seen at [facility]. Pt referred for wt loss. Pt was referred by Vincent Travis DO effective 08/28/24.  1. Class 2 obesity due to excess calories without serious comorbidity with body mass index (BMI) of 36.0 to 36.9 in adult  Referral to Nutrition Services           Past Medical Hx:  Patient Active Problem List   Diagnosis    Abdominal pain, acute, right upper quadrant    Atypical neuralgia    Breast lump    Cough    COVID-19 virus detected    Depression    Dizziness    Eczema craquele    Encounter for laboratory testing for COVID-19 virus    Fatigue    Extensor tendinitis of foot    Generalized anxiety disorder    Globus sensation    Headache    Heart palpitations    Knee pain, right    Muscle ache    Other diseases of vocal cords    Skin lesion    Urethritis    UTI symptoms    Anxiety    Migraine headache    Localized swelling, mass and lump, right lower limb    Class 2 obesity due to excess calories without serious comorbidity with body mass index (BMI) of 36.0 to 36.9 in adult    BMI 39.0-39.9,adult    Class 2 severe obesity due to excess calories with serious comorbidity and body mass index (BMI) of 39.0 to 39.9 in adult        Current Outpatient Medications:     cloNIDine (Catapres) 0.1 mg tablet, Take 1 tablet (0.1 mg) by mouth 2 times a day as needed for anxiety., Disp: , Rfl:     FLUoxetine (PROzac) 20 mg capsule, Take 1 capsule (20 mg) by mouth once daily., Disp: , Rfl:     FLUoxetine (PROzac) 40 mg capsule, Take 1 capsule (40 mg) by mouth once daily., Disp: 30 capsule, Rfl: 2    ondansetron (Zofran) 4 mg tablet, Take 1 tablet (4 mg) by mouth every 8 hours if needed for nausea or vomiting., Disp: , Rfl:      Anthropometrics:  Anthropometrics  Height: 157.5 cm (5' 2.01\")  Weight: 99.3 kg (219 lb)  BMI (Calculated): 40.05   Weight change:    Significant Weight Change: No    Lab Results   Component Value Date    CHOL 140 01/05/2023    LDLF 92 01/05/2023    " TRIG 62 01/05/2023    HDL 35.3 (A) 01/05/2023          Chemistry    Lab Results   Component Value Date/Time     (L) 02/02/2024 1339    K 4.6 02/02/2024 1339     02/02/2024 1339    CO2 27 02/02/2024 1339    BUN 11 02/02/2024 1339    CREATININE 0.81 02/02/2024 1339    Lab Results   Component Value Date/Time    CALCIUM 9.7 02/02/2024 1339    ALKPHOS 66 02/02/2024 1339    AST 27 02/02/2024 1339    ALT 48 (H) 02/02/2024 1339    BILITOT 0.3 02/02/2024 1339        Food and Nutrition Hx:  Pt wakes up feeling tired. Pt wakes up at 6:00 am.   Pt has been craving sugar after meals. She reports having a dopamine release after eating.   Pt states she has been a emotional eater.   She binge eats once a week. Binge eating is considered a moderate binge and will have a sleep of OREOS.   3 meals and 2 snacks are consumed per day. Pt is using Lose-IT.  She is consuming 2400 calories. 120 grams of protein     24 Diet Recall:  Meal 1: Breakfast may be at 6:00-9:00 am to include protein Breakfast Bar such 2  KIND bar (kcal 200, added sugar 16)  One hour later she may consume cottage cheese with added fruit.   Meal 2: Lunch is at 12:00- 1:00 to include 6 ounces of turkey with cheese on 2 slices of wheat bread (added sugar 10 gram). She may also have chips such as Quest chips. She is given to eat sugar too. She yogurt with fruit and granola.   Energy drops at 2:00-2:30 she consumes a cup of coffee.  Meal 3: Dinner is at 5:00 to include 5 chicken nuggets or 1 cup of macaroni and cheese. She feels tired and after dinner.     Snacks: sometimes a bedtime snack may be consumed at 9:00 to include a brownie, cheese, or a yogurt.   Beverages: 24 ounces of water  She has dark urine and sometimes dizziness upon standing.     Allergies: None  Intolerance: None  Appetite: Good  Intake: >75%  GI Symptoms : None   Swallowing Difficulty: No problems with swallowing  Dentition : own    Types of Activities: Walking and Strength Based  "Training  Walking 5 days a week for 30 minutes at a light/ moderate intensity.   Resistance training 2 times a week for 45 minutes at a moderate intensity level.  240 minutes of exercise is performed per week.     Sleep duration/quality : 5-6 hours and disrupted sleep. Her son wakes her up and she   Sleep disorders: none    Supplements: Fish Oil daily    Feelings of Hunger?: Yes and will eat  Physical Feeling: Physically full, Stuffed, and Sluggish  Binging: Frequently  Cravings: Sweet  Energy Levels: Stable    Food Preparation: Patient and Partner/Spouse  Cooking Skills/Barriers: None reported  Grocery Shopping: Patient and Partner/Spouse    Eating Out Type: Take Out  3 times per week    Nutrition Focused Physical Exam:    Performed/Deferred: Deferred as pt visually appears well-nourished with no signs of malnutrition    Physical Findings (Nutrition Deficiency/Toxicity)  Hair: Negative  Eyes: Negative  Mouth: Negative  Nails: Negative  Skin: Negative     Malnutrition Present: No    Estimated Energy Needs:  Energy Needs   Calculated Energy Needs Using Equations  Height: 157.5 cm (5' 2.01\")  Weight Used for Equation Calculations: 99.3 kg (219 lb)  Pema Cui Equation (Overweight or Obese Patients): 1632  Equation Chosen to Use by RD: Wander Collazo  Activity Factor: 1.4  Total Energy Needs: 2284  Estimated Energy Needs  Total Energy Estimated Needs (kCal): 1784 kCal    Nutrition Diagnosis:  Nutrition Diagnosis     Patient has Nutrition Diagnosis Yes   Diagnosis Status (1) New   Nutrition Diagnosis 1 Food and nutrition related knowledge deficit   Related to (1) how to eat for wt loss with BMI at 40   As Evidenced by (1) reports by pt of the need to learn.   Additional Nutrition Diagnosis Diagnosis 3   Diagnosis Status (2) New   Nutrition Diagnosis 2 Excessive energy intake   Related to (2) selection of high calorie foods   As Evidenced by (2) kcal intake exceeds kcals recommeded for wt loss   Diagnosis Status " (3) New   Nutrition Diagnosis 3 Excessive carbohydrate intake   Related to (3) consumption of added sugars leading to sugar cravings   As Evidenced by (3) consumption of added sugar exceeds the daily limit daily.       Nutrition Interventions:  Medical nutrition therapy was given for wt loss.   Nutrition Counseling: Motivational Interviewing and CBT  Coordination of Care: None    Nutrition Recommendations:  1,780 calories per day for 1 lb wt loss per week. Heart healthy meal plan with a low saturated fat intake to <5 -6 % of energy (less than 11 grams of saturated fat per day), reduced intake of added sugars (<10% of total energy), 25 grams of fiber with intake of viscous fiber to 5 g/day to 10 g/day, plant sterols/stanols to 2 g/day, 1.1 gram of omega three fatty acids, and a 1,500 mg sodium restriction. Increase awareness and response to hunger and satiety while gaining more contentment.     Nutrition Goals:  Via teach back method patient verbalized understanding of the following topics:  1) The recommendation for exercise and wt loss is 300- 400 minutes per week. Now ramon 5 days a week for 35 minutes at a moderate intensity and then add a 3 days to resistance training.   2) It is recommended to consume ~1,800 calories per day for 1 lb wt loss per week. Strive to be mindful of calories consumed especially when eating out to limit calories one time to no more than 500 calories.   3) Aim for a meal and snack schedule. Strive to consume breakfast within 1 -2 hours of waking to jump start the metabolism. Aim for breakfast at 8:00 am, lunch at noon, snack at 4:00,  dinner at 6:00 and a bedtime snack at 9:00 pm.  4) Strive to include protein at the meals and even snacks. Protein at meals can increase the metabolism too.  Protein at snacks can sustain energy, stabilize blood glucose, and provide more contentment. Protein foods include eggs, egg whites, cheese, nuts, nut butters, Greek yogurt, poultry, meat, fish, tofu,  plant-based protein powder, and/or plant-based protein drinks.   5) Strive to pay attention to the added sugar within foods. The recommendation for women is to limit added sugar to less than 25 grams per day. To identify a low added sugar food, review the %Daily Value as 5% or less is considered a low added sugar food. To help reduce added sugar switch from the KIND bar to oatmeal, fruit, and nuts/Fairlife, from current bread to David bread, from cottage with fruit to plain cottage cheese and then add fruit, etc.   6) Aim for 64 ounces of water per day.     Educational Handouts/Practices: Added Sugars on the Nutrition Facts Label   Next Session: Plate Method and Reset breathe     Fang Curtis, MS, RDN, LD, ANGEL   Advanced Practice Clinical Dietitian  [email or web address]  Schedule line [phone]  Direct line [phone]     Readiness to Change : Good  Level of Understanding : Good  Anticipated Compliant : Good

## 2024-10-02 NOTE — PATIENT INSTRUCTIONS
1) The recommendation for exercise and wt loss is 300- 400 minutes per week. Now ramon 5 days a week for 35 minutes at a moderate intensity and then add a 3 days to resistance training.   2) It is recommended to consume ~1,800 calories per day for 1 lb wt loss per week. Strive to be mindful of calories consumed especially when eating out to limit calories one time to no more than 500 calories.   3) Aim for a meal and snack schedule. Strive to consume breakfast within 1 -2 hours of waking to jump start the metabolism. Aim for breakfast at 8:00 am, lunch at noon, snack at 4:00,  dinner at 6:00 and a bedtime snack at 9:00 pm.  4) Strive to include protein at the meals and even snacks. Protein at meals can increase the metabolism too.  Protein at snacks can sustain energy, stabilize blood glucose, and provide more contentment. Protein foods include eggs, egg whites, cheese, nuts, nut butters, Greek yogurt, poultry, meat, fish, tofu, plant-based protein powder, and/or plant-based protein drinks.   5) Strive to pay attention to the added sugar within foods. The recommendation for women is to limit added sugar to less than 25 grams per day. To identify a low added sugar food, review the %Daily Value as 5% or less is considered a low added sugar food. To help reduce added sugar switch from the KIND bar to oatmeal, fruit, and nuts/Fairlife, from current bread to David bread, from cottage with fruit to plain cottage cheese and then add fruit, etc.     Educational Handouts/Practices: Added Sugars on the Nutrition Facts Label   Next Session: Plate Method and Reset breathe     Fang Curtis, MS, RDN, RODRICK, ANGEL   Advanced Practice Clinical Dietitian  [email or web address]  Schedule line [phone]  Direct line [phone]

## 2024-10-03 ENCOUNTER — SOCIAL WORK (OUTPATIENT)
Dept: PRIMARY CARE | Facility: CLINIC | Age: 39
End: 2024-10-03
Payer: COMMERCIAL

## 2024-10-03 ENCOUNTER — APPOINTMENT (OUTPATIENT)
Dept: PRIMARY CARE | Facility: CLINIC | Age: 39
End: 2024-10-03
Payer: COMMERCIAL

## 2024-10-03 ASSESSMENT — ANXIETY QUESTIONNAIRES
3. WORRYING TOO MUCH ABOUT DIFFERENT THINGS: SEVERAL DAYS
6. BECOMING EASILY ANNOYED OR IRRITABLE: SEVERAL DAYS
GAD7 TOTAL SCORE: 5
IF YOU CHECKED OFF ANY PROBLEMS ON THIS QUESTIONNAIRE, HOW DIFFICULT HAVE THESE PROBLEMS MADE IT FOR YOU TO DO YOUR WORK, TAKE CARE OF THINGS AT HOME, OR GET ALONG WITH OTHER PEOPLE: SOMEWHAT DIFFICULT
5. BEING SO RESTLESS THAT IT IS HARD TO SIT STILL: SEVERAL DAYS
7. FEELING AFRAID AS IF SOMETHING AWFUL MIGHT HAPPEN: NOT AT ALL
1. FEELING NERVOUS, ANXIOUS, OR ON EDGE: SEVERAL DAYS
2. NOT BEING ABLE TO STOP OR CONTROL WORRYING: SEVERAL DAYS
4. TROUBLE RELAXING: NOT AT ALL

## 2024-10-03 ASSESSMENT — PATIENT HEALTH QUESTIONNAIRE - PHQ9
1. LITTLE INTEREST OR PLEASURE IN DOING THINGS: MORE THAN HALF THE DAYS
2. FEELING DOWN, DEPRESSED OR HOPELESS: MORE THAN HALF THE DAYS
6. FEELING BAD ABOUT YOURSELF - OR THAT YOU ARE A FAILURE OR HAVE LET YOURSELF OR YOUR FAMILY DOWN: NOT AT ALL
SUM OF ALL RESPONSES TO PHQ QUESTIONS 1-9: 10
SUM OF ALL RESPONSES TO PHQ9 QUESTIONS 1 & 2: 4
3. TROUBLE FALLING OR STAYING ASLEEP: SEVERAL DAYS
8. MOVING OR SPEAKING SO SLOWLY THAT OTHER PEOPLE COULD HAVE NOTICED. OR THE OPPOSITE, BEING SO FIGETY OR RESTLESS THAT YOU HAVE BEEN MOVING AROUND A LOT MORE THAN USUAL: NOT AT ALL
4. FEELING TIRED OR HAVING LITTLE ENERGY: SEVERAL DAYS
10. IF YOU CHECKED OFF ANY PROBLEMS, HOW DIFFICULT HAVE THESE PROBLEMS MADE IT FOR YOU TO DO YOUR WORK, TAKE CARE OF THINGS AT HOME, OR GET ALONG WITH OTHER PEOPLE: SOMEWHAT DIFFICULT
9. THOUGHTS THAT YOU WOULD BE BETTER OFF DEAD, OR OF HURTING YOURSELF: NOT AT ALL
7. TROUBLE CONCENTRATING ON THINGS, SUCH AS READING THE NEWSPAPER OR WATCHING TELEVISION: MORE THAN HALF THE DAYS
5. POOR APPETITE OR OVEREATING: MORE THAN HALF THE DAYS

## 2024-10-03 NOTE — PROGRESS NOTES
"Collaborative Care (CoCM)  Progress Note    Type of Interaction: Virtual    Start Time: 2:01 PM    End Time: 2:38 PM    Appointment: Scheduled    Reason for Visit:   Chief Complaint   Patient presents with    Anxiety    Review of psych consult recommendations; resources provided to enhance self esteem/self worth    Interval History / Patient Symptoms:   Pt reports jittery feeling decreasing since increase of Prozac and \"getting close to her baseline\".  Pt stated she would like maintain this dose for coming weeks before considering changes.  Pt admits to struggles with self esteem.  Pt has connected with RD services for weight loss and reports first meeting went very well.  Pt maintains weekly exercise with a  and working on changing some eating habits.  Pt discussed past social club-softball she was involved in but would drink to help herself feel more comfortable.  Pt having a lot of anxiety in social settings now that she stopped drinking alcohol.    Patient Health Questionnaire-9 Score: 10 (10/3/2024  2:57 PM)  DOMINIC-7 Total Score: 5 (10/3/2024  2:58 PM)    Interventions Provided: Psychoeducation, Motivational Interviewing, Strengths Exploration, Develop Coping Strategies, Review Progress/Goals Stress Management, Referrals, Homework F/U, and Applauded pt for follow through with journaling, nutritions services and exercise.  Applauded pt for stopping drinking alcohol despite challenges she is facing.  Provided support and referral to  LBGTQ+ services designed for this population that has struggled with substance use.  Discussion of psych consult recommendations.        Progress Made: Minimum    Response to Intervention: Pt pleased with her RD and services provided.  Pt enjoying her exercise training on Tuesdays.  Pt discussed challenges she has in social settings since she stopped using alcohol.  Pt open to ideas for self esteem building groups and working on her confidence in social settings.      Plan: Pt " to maintain dietary and exercise supports; maintain gratitude journaling and consider LGBT+ services for mental health needs sent via email.      Patient Instructions   Refer to email for resources for self esteem building activities.    Follow Up / Next Appointment: Next appointment: 10/17/24

## 2024-10-15 ENCOUNTER — APPOINTMENT (OUTPATIENT)
Dept: OTOLARYNGOLOGY | Facility: CLINIC | Age: 39
End: 2024-10-15
Payer: COMMERCIAL

## 2024-10-15 ENCOUNTER — CLINICAL SUPPORT (OUTPATIENT)
Dept: AUDIOLOGY | Facility: CLINIC | Age: 39
End: 2024-10-15
Payer: COMMERCIAL

## 2024-10-15 DIAGNOSIS — H93.13 TINNITUS OF BOTH EARS: Primary | ICD-10-CM

## 2024-10-15 PROCEDURE — 92550 TYMPANOMETRY & REFLEX THRESH: CPT | Performed by: AUDIOLOGIST

## 2024-10-15 PROCEDURE — 92557 COMPREHENSIVE HEARING TEST: CPT | Performed by: AUDIOLOGIST

## 2024-10-15 ASSESSMENT — ENCOUNTER SYMPTOMS: OCCASIONAL FEELINGS OF UNSTEADINESS: 0

## 2024-10-15 ASSESSMENT — PAIN SCALES - GENERAL: PAINLEVEL_OUTOF10: 0 - NO PAIN

## 2024-10-15 ASSESSMENT — PAIN - FUNCTIONAL ASSESSMENT: PAIN_FUNCTIONAL_ASSESSMENT: 0-10

## 2024-10-15 NOTE — PROGRESS NOTES
AUDIOLOGY ADULT AUDIOMETRIC EVALUATION      Name:  Cassia Martin  :  1985  Age:  39 y.o.  Date of Evaluation: 10/15/24    History:  Reason for visit:  Ms. Martin was seen today as part of the visit with Bethany Márquez MD. for an evaluation of hearing.   Chief Complaint   Patient presents with    Tinnitus     The patient reported she has experienced constant bilateral non-pulsatile non-bothersome tinnitus for the past few years. Stated she has noticed the volume of the tinnitus has essentially remained the same, however, she may notice a slight pulsatile tinnitus with an increase in her sodium content. Denied any difficulty sleeping or communicating due to the tinnitus. Denied any difficulty performing daily living tasks due to the tinnitus.  Reported her father has a history of Meniere's disease and has lost hearing in one ear. Denied any concerns for hearing loss or difficulty hearing at this time.   Mentioned she has a history of migraine headaches, however, reported the frequency of headaches, appears to have lessened. Stated she has noticed a general sensation of lightheadedness and dizziness with some occasional head pressure.   Denied any otalgia, aural fullness, ear pressure, ear surgery, recent ear/sinus infections, recent falls, significant noise exposure, sinus/throat concerns, ear drainage, or sudden hearing loss.    EVALUATION     See Audiogram    RESULTS:    Otoscopic Evaluation:   Right Ear: Otoscopy revealed a clear healthy canal and a healthy tympanic membrane was visualized.   Left Ear:  Otoscopy revealed a clear healthy canal and a healthy tympanic membrane was visualized.     Immittance:  Immittance Measures: 226 Hz   Right Ear: Tympanometric testing revealed a normal type A tympanogram with normal middle ear pressure and normal static compliance.  Left Ear: Tympanometric testing revealed a normal type A tympanogram with normal middle ear pressure and normal static compliance.    Right:  Ipsilateral acoustic reflexes were present at, 500-4,000 Hz, at expected sensation levels.  Left Ear: Ipsilateral acoustic reflexes were present at, 500-4,000 Hz, at expected sensation levels.    Test technique:  Pure Tone Audiometry via TDH headphones    Reliability:   excellent    Pure Tone Audiometry:    Right Ear: Audiometric testing indicated normal peripheral hearing sensitivity from 125-8,000 Hz.  Left Ear:   Audiometric testing indicated normal peripheral hearing sensitivity from 125-8,000 Hz.      Speech Audiometry:   Right Ear:  Speech Reception Threshold (SRT) was obtained at 10 dBHL                 Word Recognition scores were excellent (100%) in quiet when words were presented at 50 dBHL  Left Ear:  Speech Reception Threshold (SRT) was obtained at 10 dBHL                 Word Recognition scores were excellent (100%) in quiet when words were presented at 50 dBHL  Testing was performed with recorded NU-6 speech words in quiet. Speech thresholds were in good agreement with the pure tone averages in each ear.     IMPRESSIONS:  Today's test results are consistent with normal peripheral hearing sensitivity, bilaterally.  The patient was counseled with regard to the findings.    RECOMMENDATIONS:  * Continue medical follow up with Bethany Márquez MD.  * Retest as medically indicated, or sooner if a change in hearing sensitivity or tinnitus is noticed.   * Wear hearing protection while in the presence of loud sounds.   * Use tinnitus coping strategies as needed, such as sound apps on a smart phone, utilizing calming noise in the room, running a fan at night, etc.     PATIENT EDUCATION:   Discussed results and recommendations with the patient.  Questions were addressed and the patient was encouraged to contact our department should concerns arise.  The patient was seen from 8:30-9:00 am

## 2024-10-17 ENCOUNTER — APPOINTMENT (OUTPATIENT)
Dept: PRIMARY CARE | Facility: CLINIC | Age: 39
End: 2024-10-17
Payer: COMMERCIAL

## 2024-10-17 ASSESSMENT — ANXIETY QUESTIONNAIRES
1. FEELING NERVOUS, ANXIOUS, OR ON EDGE: SEVERAL DAYS
GAD7 TOTAL SCORE: 3
5. BEING SO RESTLESS THAT IT IS HARD TO SIT STILL: NOT AT ALL
2. NOT BEING ABLE TO STOP OR CONTROL WORRYING: NOT AT ALL
4. TROUBLE RELAXING: NOT AT ALL
6. BECOMING EASILY ANNOYED OR IRRITABLE: SEVERAL DAYS
3. WORRYING TOO MUCH ABOUT DIFFERENT THINGS: SEVERAL DAYS
IF YOU CHECKED OFF ANY PROBLEMS ON THIS QUESTIONNAIRE, HOW DIFFICULT HAVE THESE PROBLEMS MADE IT FOR YOU TO DO YOUR WORK, TAKE CARE OF THINGS AT HOME, OR GET ALONG WITH OTHER PEOPLE: SOMEWHAT DIFFICULT
7. FEELING AFRAID AS IF SOMETHING AWFUL MIGHT HAPPEN: NOT AT ALL

## 2024-10-17 ASSESSMENT — PATIENT HEALTH QUESTIONNAIRE - PHQ9
6. FEELING BAD ABOUT YOURSELF - OR THAT YOU ARE A FAILURE OR HAVE LET YOURSELF OR YOUR FAMILY DOWN: NOT AT ALL
10. IF YOU CHECKED OFF ANY PROBLEMS, HOW DIFFICULT HAVE THESE PROBLEMS MADE IT FOR YOU TO DO YOUR WORK, TAKE CARE OF THINGS AT HOME, OR GET ALONG WITH OTHER PEOPLE: SOMEWHAT DIFFICULT
5. POOR APPETITE OR OVEREATING: SEVERAL DAYS
4. FEELING TIRED OR HAVING LITTLE ENERGY: SEVERAL DAYS
8. MOVING OR SPEAKING SO SLOWLY THAT OTHER PEOPLE COULD HAVE NOTICED. OR THE OPPOSITE, BEING SO FIGETY OR RESTLESS THAT YOU HAVE BEEN MOVING AROUND A LOT MORE THAN USUAL: NOT AT ALL
SUM OF ALL RESPONSES TO PHQ QUESTIONS 1-9: 6
9. THOUGHTS THAT YOU WOULD BE BETTER OFF DEAD, OR OF HURTING YOURSELF: NOT AT ALL
7. TROUBLE CONCENTRATING ON THINGS, SUCH AS READING THE NEWSPAPER OR WATCHING TELEVISION: MORE THAN HALF THE DAYS
1. LITTLE INTEREST OR PLEASURE IN DOING THINGS: NOT AT ALL
3. TROUBLE FALLING OR STAYING ASLEEP: SEVERAL DAYS
SUM OF ALL RESPONSES TO PHQ9 QUESTIONS 1 & 2: 1
2. FEELING DOWN, DEPRESSED OR HOPELESS: SEVERAL DAYS

## 2024-10-17 NOTE — PROGRESS NOTES
Collaborative Care (CoCM)  Progress Note    Type of Interaction: In Office    Start Time: 1:02 PM    End Time: 1:45 PM      Appointment: Scheduled    Reason for Visit:   Chief Complaint   Patient presents with    Depression    Anxiety    Review of PHQ/Dominic screeners; progress made toward tx goals.      Interval History / Patient Symptoms:   Pt reports significant improvement in her mental clarity since quitting alcohol, using prozac and getting better sleep.  Pt reports son has been doing better sleeping through the night.  Pt discussed some struggles with eating habits and sweets.      Patient Health Questionnaire-9 Score: 6 (10/17/2024  1:54 PM)  DOMINIC-7 Total Score: 3 (10/17/2024  1:54 PM)    Interventions Provided: Psychoeducation, Behavioral Activation, Motivational Interviewing, Strengths Exploration, Values Exploration, Develop Coping Strategies, Review Progress/Goals Stress Management, and Applauded pt for hard work with sleep and quitting alcohol and many benefits to this choice.  Utilized MI and BA to identify BA goal of something she values for cooking a meal with her family that is healthy and positive time spent together.  Pt curious about ways to improve her self esteem.  Pt discussed challenges with food prep and healthy eating barriers.   [facility] provided pt with therapeutic materials to use.  Discussed upcoming stressor and deep breathing/body scan practices she can utilize during the event.    Progress Made: Significant    Response to Intervention: Pt reports her memory has improved tremendously with lifestyle changes and better sleep quality.  Pt discuss barriers for healthy eating but also had great idea for wants to improve and willing to set goal to cook a healthy meal with her son and wife as a family bonding experience.  Pt identified values she has with healthy eating but hating cooking.  Pt seemed open to continue to try to do something she doesn't enjoy because of how much she values other  aspects of this experience.  Pt maintains a  and tries to make healthy food choices.      Plan: Pt to use self esteem building and growth mindset while navigating food choices daily.      Patient Instructions   Review self esteem building tools sent to email as requested.    Follow Up / Next Appointment: Next appointment: 11/08/24

## 2024-10-29 ENCOUNTER — APPOINTMENT (OUTPATIENT)
Dept: OTOLARYNGOLOGY | Facility: CLINIC | Age: 39
End: 2024-10-29
Payer: COMMERCIAL

## 2024-10-29 VITALS
HEIGHT: 62 IN | BODY MASS INDEX: 39.93 KG/M2 | DIASTOLIC BLOOD PRESSURE: 85 MMHG | WEIGHT: 217 LBS | SYSTOLIC BLOOD PRESSURE: 126 MMHG

## 2024-10-29 DIAGNOSIS — H93.13 TINNITUS OF BOTH EARS: Primary | ICD-10-CM

## 2024-10-29 PROCEDURE — 1036F TOBACCO NON-USER: CPT | Performed by: STUDENT IN AN ORGANIZED HEALTH CARE EDUCATION/TRAINING PROGRAM

## 2024-10-29 PROCEDURE — 92504 EAR MICROSCOPY EXAMINATION: CPT | Performed by: STUDENT IN AN ORGANIZED HEALTH CARE EDUCATION/TRAINING PROGRAM

## 2024-10-29 PROCEDURE — 99203 OFFICE O/P NEW LOW 30 MIN: CPT | Performed by: STUDENT IN AN ORGANIZED HEALTH CARE EDUCATION/TRAINING PROGRAM

## 2024-10-29 PROCEDURE — 3008F BODY MASS INDEX DOCD: CPT | Performed by: STUDENT IN AN ORGANIZED HEALTH CARE EDUCATION/TRAINING PROGRAM

## 2024-10-31 PROCEDURE — 99494 1ST/SBSQ PSYC COLLAB CARE: CPT | Performed by: FAMILY MEDICINE

## 2024-10-31 PROCEDURE — 99493 SBSQ PSYC COLLAB CARE MGMT: CPT | Performed by: FAMILY MEDICINE

## 2024-11-01 ENCOUNTER — DOCUMENTATION (OUTPATIENT)
Dept: PRIMARY CARE | Facility: CLINIC | Age: 39
End: 2024-11-01
Payer: COMMERCIAL

## 2024-11-01 DIAGNOSIS — F32.A DEPRESSION, UNSPECIFIED DEPRESSION TYPE: ICD-10-CM

## 2024-11-01 DIAGNOSIS — F41.1 GENERALIZED ANXIETY DISORDER: Primary | ICD-10-CM

## 2024-11-08 ENCOUNTER — APPOINTMENT (OUTPATIENT)
Dept: PRIMARY CARE | Facility: CLINIC | Age: 39
End: 2024-11-08
Payer: COMMERCIAL

## 2024-11-15 ENCOUNTER — APPOINTMENT (OUTPATIENT)
Dept: PRIMARY CARE | Facility: CLINIC | Age: 39
End: 2024-11-15
Payer: COMMERCIAL

## 2024-11-15 ASSESSMENT — ANXIETY QUESTIONNAIRES
2. NOT BEING ABLE TO STOP OR CONTROL WORRYING: SEVERAL DAYS
7. FEELING AFRAID AS IF SOMETHING AWFUL MIGHT HAPPEN: NOT AT ALL
IF YOU CHECKED OFF ANY PROBLEMS ON THIS QUESTIONNAIRE, HOW DIFFICULT HAVE THESE PROBLEMS MADE IT FOR YOU TO DO YOUR WORK, TAKE CARE OF THINGS AT HOME, OR GET ALONG WITH OTHER PEOPLE: NOT DIFFICULT AT ALL
3. WORRYING TOO MUCH ABOUT DIFFERENT THINGS: SEVERAL DAYS
GAD7 TOTAL SCORE: 3
6. BECOMING EASILY ANNOYED OR IRRITABLE: NOT AT ALL
4. TROUBLE RELAXING: NOT AT ALL
1. FEELING NERVOUS, ANXIOUS, OR ON EDGE: SEVERAL DAYS
5. BEING SO RESTLESS THAT IT IS HARD TO SIT STILL: NOT AT ALL

## 2024-11-15 ASSESSMENT — PATIENT HEALTH QUESTIONNAIRE - PHQ9
6. FEELING BAD ABOUT YOURSELF - OR THAT YOU ARE A FAILURE OR HAVE LET YOURSELF OR YOUR FAMILY DOWN: NOT AT ALL
10. IF YOU CHECKED OFF ANY PROBLEMS, HOW DIFFICULT HAVE THESE PROBLEMS MADE IT FOR YOU TO DO YOUR WORK, TAKE CARE OF THINGS AT HOME, OR GET ALONG WITH OTHER PEOPLE: SOMEWHAT DIFFICULT
8. MOVING OR SPEAKING SO SLOWLY THAT OTHER PEOPLE COULD HAVE NOTICED. OR THE OPPOSITE, BEING SO FIGETY OR RESTLESS THAT YOU HAVE BEEN MOVING AROUND A LOT MORE THAN USUAL: NOT AT ALL
5. POOR APPETITE OR OVEREATING: SEVERAL DAYS
SUM OF ALL RESPONSES TO PHQ QUESTIONS 1-9: 6
4. FEELING TIRED OR HAVING LITTLE ENERGY: SEVERAL DAYS
7. TROUBLE CONCENTRATING ON THINGS, SUCH AS READING THE NEWSPAPER OR WATCHING TELEVISION: SEVERAL DAYS
2. FEELING DOWN, DEPRESSED OR HOPELESS: SEVERAL DAYS
1. LITTLE INTEREST OR PLEASURE IN DOING THINGS: SEVERAL DAYS
9. THOUGHTS THAT YOU WOULD BE BETTER OFF DEAD, OR OF HURTING YOURSELF: NOT AT ALL
SUM OF ALL RESPONSES TO PHQ9 QUESTIONS 1 & 2: 2
3. TROUBLE FALLING OR STAYING ASLEEP: SEVERAL DAYS

## 2024-11-15 NOTE — PROGRESS NOTES
"Collaborative Care (CoCM)  Progress Note    Type of Interaction: Virtual    Start Time: 10:32 AM    End Time: 11:07 AM    Appointment: Scheduled    Reason for Visit:   Chief Complaint   Patient presents with    Anxiety    Depression    Review of medications, tx planning    Interval History / Patient Symptoms:   Pt reports significant improvement with anxiety sx since the increase in Prozac.  Pt reports communication going well with family.  Pt reports improvement with sleep overall but still struggling to get a full 7 hours.  Pt has maintained alcohol free for 4 months and feeling much better and \"clear-headed\" not using alcohol now.    Patient Health Questionnaire-9 Score: 6 (11/15/2024  4:28 PM)  DOMINIC-7 Total Score: 3 (11/15/2024  4:29 PM)    Interventions Provided: Psychoeducation, Problem Solving Treatment, Motivational Interviewing, Values Exploration, Develop Coping Strategies, Review Progress/Goals Stress Management, Mindfulness, and Provided pt with various non-pharmacological sleep aids (eye masks, white noise, weighted blanket, light therapy).  Applauded pt for her efforts in past several weeks to try and cook with child, her follow through with meal prep and making more meals for family.  Applauded pt for making lists to help manage ADHD sx.  PST surrounding issues with sleep.        Progress Made: Moderate    Response to Intervention: Pt had questions about Prozac interaction with Chamomile and Melatonin and asked for feedback from consulting psychiatrist.  Pt open to trying suggestions that may help with her sleep that [facility] provided from psychiatrist's recommendation.  Pt agreed to work on meditation as a way to help herself wind down before bed.        Plan: Utilize non-pharmacological sleep aids sent via email.  Maintain family time and making lists to help her work more efficiently.  Pt would like to eat more vegetables and less sweets.      Patient Instructions   Please review email with non " pharmacological sleep aids.  Maintain meal prepping and less take out during the week.    Follow Up / Next Appointment: Next appointment: 12/06/24

## 2024-11-15 NOTE — PATIENT INSTRUCTIONS
Please review email with non pharmacological sleep aids.  Maintain meal prepping and less take out during the week.

## 2024-11-20 ENCOUNTER — DOCUMENTATION (OUTPATIENT)
Dept: BEHAVIORAL HEALTH | Facility: HOSPITAL | Age: 39
End: 2024-11-20
Payer: COMMERCIAL

## 2024-11-20 ENCOUNTER — DOCUMENTATION (OUTPATIENT)
Dept: PRIMARY CARE | Facility: CLINIC | Age: 39
End: 2024-11-20
Payer: COMMERCIAL

## 2024-11-20 NOTE — PROGRESS NOTES
[facility] Psychiatry Consult Note     Cassia Martin is a 39 y.o., referred to Collaborative Care for symptoms of depression and anxiety. I have reviewed the patient with the behavioral health manager and reviewed the patient's electronic record.    Recommendations:   - Chamomile will not interact with any medications  - Melatonin will not interact either. Would start with melatonin 1 mg taken at 6pm, and can increase as needed up to 5 mg at 6pm.    Prozac is helping a lot with mood and anxiety, however, sleep is still an issue. Patient is wondering if melatonin or chamomile tea would interact with prozac, as she would like to try taking these for sleep.    Patient Health Questionnaire-9 Score: 6 (11/15/2024  4:28 PM)  DOMINIC-7 Total Score: 3 (11/15/2024  4:29 PM)      The above treatment considerations and suggestions are based on consultations with the patient's care manager and a review of information available in the electronic medical record. I have not personally examined the patient. All recommendations should be implemented with consideration of the patient's relevant prior history and current clinical status. Please feel free to call me with any questions about the care of this patient. I can easily be reached through [employer] or at [email or web address]

## 2024-11-20 NOTE — PROGRESS NOTES
[facility] sent email outreach to patient re: her questions about Prozac drug interactions with Chamomile tea and Melatonin for help with sleep.

## 2024-11-27 ENCOUNTER — DOCUMENTATION (OUTPATIENT)
Dept: PRIMARY CARE | Facility: CLINIC | Age: 39
End: 2024-11-27
Payer: COMMERCIAL

## 2024-11-27 DIAGNOSIS — F41.1 GENERALIZED ANXIETY DISORDER: Primary | ICD-10-CM

## 2024-11-27 DIAGNOSIS — F32.A DEPRESSION, UNSPECIFIED DEPRESSION TYPE: ICD-10-CM

## 2024-11-27 PROCEDURE — 99493 SBSQ PSYC COLLAB CARE MGMT: CPT | Performed by: FAMILY MEDICINE

## 2024-12-06 ENCOUNTER — APPOINTMENT (OUTPATIENT)
Dept: PRIMARY CARE | Facility: CLINIC | Age: 39
End: 2024-12-06
Payer: COMMERCIAL

## 2024-12-06 ASSESSMENT — PATIENT HEALTH QUESTIONNAIRE - PHQ9
7. TROUBLE CONCENTRATING ON THINGS, SUCH AS READING THE NEWSPAPER OR WATCHING TELEVISION: MORE THAN HALF THE DAYS
1. LITTLE INTEREST OR PLEASURE IN DOING THINGS: NOT AT ALL
SUM OF ALL RESPONSES TO PHQ QUESTIONS 1-9: 7
10. IF YOU CHECKED OFF ANY PROBLEMS, HOW DIFFICULT HAVE THESE PROBLEMS MADE IT FOR YOU TO DO YOUR WORK, TAKE CARE OF THINGS AT HOME, OR GET ALONG WITH OTHER PEOPLE: SOMEWHAT DIFFICULT
9. THOUGHTS THAT YOU WOULD BE BETTER OFF DEAD, OR OF HURTING YOURSELF: NOT AT ALL
SUM OF ALL RESPONSES TO PHQ9 QUESTIONS 1 & 2: 1
8. MOVING OR SPEAKING SO SLOWLY THAT OTHER PEOPLE COULD HAVE NOTICED. OR THE OPPOSITE, BEING SO FIGETY OR RESTLESS THAT YOU HAVE BEEN MOVING AROUND A LOT MORE THAN USUAL: NOT AT ALL
5. POOR APPETITE OR OVEREATING: NOT AT ALL
2. FEELING DOWN, DEPRESSED OR HOPELESS: SEVERAL DAYS
6. FEELING BAD ABOUT YOURSELF - OR THAT YOU ARE A FAILURE OR HAVE LET YOURSELF OR YOUR FAMILY DOWN: NOT AT ALL
4. FEELING TIRED OR HAVING LITTLE ENERGY: SEVERAL DAYS
3. TROUBLE FALLING OR STAYING ASLEEP: NEARLY EVERY DAY

## 2024-12-06 ASSESSMENT — ANXIETY QUESTIONNAIRES
6. BECOMING EASILY ANNOYED OR IRRITABLE: NOT AT ALL
4. TROUBLE RELAXING: NOT AT ALL
3. WORRYING TOO MUCH ABOUT DIFFERENT THINGS: SEVERAL DAYS
1. FEELING NERVOUS, ANXIOUS, OR ON EDGE: SEVERAL DAYS
7. FEELING AFRAID AS IF SOMETHING AWFUL MIGHT HAPPEN: NOT AT ALL
GAD7 TOTAL SCORE: 3
IF YOU CHECKED OFF ANY PROBLEMS ON THIS QUESTIONNAIRE, HOW DIFFICULT HAVE THESE PROBLEMS MADE IT FOR YOU TO DO YOUR WORK, TAKE CARE OF THINGS AT HOME, OR GET ALONG WITH OTHER PEOPLE: SOMEWHAT DIFFICULT
5. BEING SO RESTLESS THAT IT IS HARD TO SIT STILL: NOT AT ALL
2. NOT BEING ABLE TO STOP OR CONTROL WORRYING: SEVERAL DAYS

## 2024-12-06 NOTE — PATIENT INSTRUCTIONS
Review DBT skills sent via email and will follow up about parenting support resources.  Reach out to past parents support resources for support now.

## 2024-12-06 NOTE — PROGRESS NOTES
Collaborative Care (CoCM)  Progress Note    Type of Interaction: Virtual    Start Time: 10:30 AM    End Time: 11:10 AM    Appointment: Scheduled    Reason for Visit:   Chief Complaint   Patient presents with    Depression    Anxiety    Review of sleep struggles; tx planning and review of past psych consultation notes.    Interval History / Patient Symptoms:   Pt reports ongoing issues with sleep despite trying Melatonin at low doses.  Pt plans to try 5 mg for the next few weeks.  If still troubles staying asleep, [facility] will consult with psychiatrist about medication options for sleep.  Pt has been in 40 mg of Prozac since Aug. 23, 2024.    Patient Health Questionnaire-9 Score: 7 (12/6/2024 12:51 PM)  DOMINIC-7 Total Score: 3 (12/6/2024 12:52 PM)    Interventions Provided: Psychoeducation, Problem Solving Treatment, Behavioral Activation, Motivational Interviewing, Strengths Exploration, Review Progress/Goals Stress Management, Referrals, Mindfulness, and Provided DBT Distress Tolerance , emotional regulation worksheets for her to review and utilize some with son as well.  Processed feelings about son outbursts and possible triggers.  Discussed past resources that have been helpful to him; normalized her feelings of frustrations.  Discussed past psych consult recommendations and inquired about pt's wishes for further consultation re: medication options.  Encouraged increase in exercise to promote higher sleep drive through night.         Progress Made: Moderate    Response to Intervention: Pt plans to get the light therapy soon to use.  Pt not thinking she wants to make medication adjustments at this time but if sleep continues to be a problem, after trying the 5 mg of Melatonin, she is interested in a medication to help with sleep that is non addictive.  Pt stated she would reach out to past parenting supports to help during recent challenging times with patient.   Pt agreed to work on increasing exercise.    Plan:  Utilize past parents support resources; maintain medications, exercise and healthy sleep practices.    Patient Instructions   Review DBT skills sent via email and will follow up about parenting support resources.  Reach out to past parents support resources for support now.    Follow Up / Next Appointment: Next appointment: 01/02/25

## 2024-12-31 ENCOUNTER — DOCUMENTATION (OUTPATIENT)
Dept: PRIMARY CARE | Facility: CLINIC | Age: 39
End: 2024-12-31
Payer: COMMERCIAL

## 2024-12-31 DIAGNOSIS — F32.A DEPRESSION, UNSPECIFIED DEPRESSION TYPE: ICD-10-CM

## 2024-12-31 DIAGNOSIS — F41.9 ANXIETY: Primary | ICD-10-CM

## 2024-12-31 PROCEDURE — 99493 SBSQ PSYC COLLAB CARE MGMT: CPT | Performed by: FAMILY MEDICINE

## 2025-01-02 ENCOUNTER — DOCUMENTATION (OUTPATIENT)
Dept: PRIMARY CARE | Facility: CLINIC | Age: 40
End: 2025-01-02

## 2025-01-02 ENCOUNTER — DOCUMENTATION (OUTPATIENT)
Dept: BEHAVIORAL HEALTH | Facility: HOSPITAL | Age: 40
End: 2025-01-02

## 2025-01-02 ENCOUNTER — APPOINTMENT (OUTPATIENT)
Dept: PRIMARY CARE | Facility: CLINIC | Age: 40
End: 2025-01-02
Payer: COMMERCIAL

## 2025-01-02 ASSESSMENT — PATIENT HEALTH QUESTIONNAIRE - PHQ9
8. MOVING OR SPEAKING SO SLOWLY THAT OTHER PEOPLE COULD HAVE NOTICED. OR THE OPPOSITE, BEING SO FIGETY OR RESTLESS THAT YOU HAVE BEEN MOVING AROUND A LOT MORE THAN USUAL: NOT AT ALL
3. TROUBLE FALLING OR STAYING ASLEEP: NEARLY EVERY DAY
9. THOUGHTS THAT YOU WOULD BE BETTER OFF DEAD, OR OF HURTING YOURSELF: NOT AT ALL
4. FEELING TIRED OR HAVING LITTLE ENERGY: SEVERAL DAYS
SUM OF ALL RESPONSES TO PHQ QUESTIONS 1-9: 7
7. TROUBLE CONCENTRATING ON THINGS, SUCH AS READING THE NEWSPAPER OR WATCHING TELEVISION: SEVERAL DAYS
10. IF YOU CHECKED OFF ANY PROBLEMS, HOW DIFFICULT HAVE THESE PROBLEMS MADE IT FOR YOU TO DO YOUR WORK, TAKE CARE OF THINGS AT HOME, OR GET ALONG WITH OTHER PEOPLE: SOMEWHAT DIFFICULT
1. LITTLE INTEREST OR PLEASURE IN DOING THINGS: SEVERAL DAYS
5. POOR APPETITE OR OVEREATING: SEVERAL DAYS
2. FEELING DOWN, DEPRESSED OR HOPELESS: NOT AT ALL
SUM OF ALL RESPONSES TO PHQ9 QUESTIONS 1 & 2: 1
6. FEELING BAD ABOUT YOURSELF - OR THAT YOU ARE A FAILURE OR HAVE LET YOURSELF OR YOUR FAMILY DOWN: NOT AT ALL

## 2025-01-02 ASSESSMENT — ANXIETY QUESTIONNAIRES
4. TROUBLE RELAXING: NOT AT ALL
IF YOU CHECKED OFF ANY PROBLEMS ON THIS QUESTIONNAIRE, HOW DIFFICULT HAVE THESE PROBLEMS MADE IT FOR YOU TO DO YOUR WORK, TAKE CARE OF THINGS AT HOME, OR GET ALONG WITH OTHER PEOPLE: SOMEWHAT DIFFICULT
GAD7 TOTAL SCORE: 2
1. FEELING NERVOUS, ANXIOUS, OR ON EDGE: SEVERAL DAYS
2. NOT BEING ABLE TO STOP OR CONTROL WORRYING: NOT AT ALL
5. BEING SO RESTLESS THAT IT IS HARD TO SIT STILL: NOT AT ALL
7. FEELING AFRAID AS IF SOMETHING AWFUL MIGHT HAPPEN: NOT AT ALL
6. BECOMING EASILY ANNOYED OR IRRITABLE: SEVERAL DAYS
3. WORRYING TOO MUCH ABOUT DIFFERENT THINGS: NOT AT ALL

## 2025-01-02 NOTE — PROGRESS NOTES
"Collaborative Care (CoCM)  Progress Note    Type of Interaction: In Office    Start Time: 9:42 AM    End Time: 10:24 AM    Appointment: Scheduled    Reason for Visit:   Chief Complaint   Patient presents with    Anxiety    Depression    Review of progress toward tx goals; psych consult recommendations and PHQ/DOMINIC improvement.    Interval History / Patient Symptoms:   Pt reports overall doing very well.  Pt has had COVID and some sickness in the last month.  Pt reports that she tried Melatonin and it helped but had a rash so leary about continuing it.  Pt open to other recommendations to help her stay asleep.      Patient Health Questionnaire-9 Score: 7 (1/2/2025 10:36 AM)  DOMINIC-7 Total Score: 2 (1/2/2025 10:36 AM)    Interventions Provided: Psychoeducation, Problem Solving Treatment, Motivational Interviewing, Strengths Exploration, Review Progress/Goals Stress Management, Treatment Planning, and Reviewed progress made in COCM and next steps for  care.  Identified questions related to sleep to inquire with consulting psychiatrist.  Applauded pt for her efforts with healthier lifestyle and goal setting for 2025 year.  Praised pt for initiative taken with son to enhance their bond and pts mood.  Provided pt with skills to practice for anxiety reduction.    Progress Made: Significant    Response to Intervention: Pt reports having moments feeling \"very happy\" and this as unlike her.  Pt happy with her progress in COCM and family members have discussed seeing a positive change in patient.  Pt continues to not drink alcohol and feeling better from this.  Pt feeling like she has skills she can continue to use even after COCM ends.  Pt open to [facility] discussing other medication for sleep assistance as lack of sleep is bothersome to her.     Plan: Pt to maintain healthy lifestyle habits and gratitude journaling to help with anxiety and depression sx.    Patient Instructions   Practice anxiety reduction links sent via " email.    Follow Up / Next Appointment: Next appointment: 02/06/25

## 2025-01-02 NOTE — PROGRESS NOTES
[facility] Psychiatry Consult Note     Cassia Martin is a 39 y.o., referred to Collaborative Care for symptoms of depression and anxiety. I have reviewed the patient with the behavioral health manager and reviewed the patient's electronic record.    Recommendations:   - Could try trazodone 25 - 50 mg at bedtime for sleep  - if this does not help, could try hydroxyzine 10-50 mg at night.    ___________________________________________________________________  Melatonin helped with sleep, but patient developed rash.    Doing well with increased dose of fluoxetine.        Patient Health Questionnaire-9 Score: 7 (1/2/2025 10:36 AM)  DOMINIC-7 Total Score: 2 (1/2/2025 10:36 AM)      The above treatment considerations and suggestions are based on consultations with the patient's care manager and a review of information available in the electronic medical record. I have not personally examined the patient. All recommendations should be implemented with consideration of the patient's relevant prior history and current clinical status. Please feel free to call me with any questions about the care of this patient. I can easily be reached through [employer] or at [email or web address]

## 2025-01-11 DIAGNOSIS — F41.1 GENERALIZED ANXIETY DISORDER: ICD-10-CM

## 2025-01-13 RX ORDER — FLUOXETINE HYDROCHLORIDE 40 MG/1
40 CAPSULE ORAL DAILY
Qty: 30 CAPSULE | Refills: 2 | Status: SHIPPED | OUTPATIENT
Start: 2025-01-13

## 2025-01-13 NOTE — TELEPHONE ENCOUNTER
Last seen in July, please call and schedule appointment.  Can send in short supply if needed, just let us know.  Thanks ladies!      [employer] message sent.

## 2025-01-14 PROBLEM — Z00.00 ENCOUNTER FOR GENERAL HEALTH EXAMINATION: Status: ACTIVE | Noted: 2023-03-10

## 2025-01-14 NOTE — PROGRESS NOTES
Subjective   Patient ID: Cassia Martin is a 39 y.o. female who presents for No chief complaint on file..  HPI  Annual physical   Eats a generally healthy diet   Exercises   Denies any chest pain,SOB  Last eye apt  Last dental apt   Last Gyn apt      Anxiety follow up  Taking the Prozac  Working well    % effective   Denies any side effects   Last took     No other concern/question     Review of systems  ; Patient seen today for exam denies any problems with headaches or vision, denies any shortness of breath chest pain nausea or vomiting, no black stool no blood in the stool no heartburn type symptoms denies any problems with constipation or diarrhea, and no dysuria-type symptoms    The patient's allergies medications were reviewed with them today    The patient's social family and surgical history or also reviewed here today, along with her past medical history.     Objective     Alert and active in  no acute distress  HEENT TMs clear oropharynx negative nares clear no drainage noted neck supple  With no adenopathy   Heart regular rate and rhythm without murmur and no carotid bruits  Lungs- clear to auscultation bilaterally, no wheeze or rhonchi noted  Thyroid -negative masses or nodularity  Abdomen- soft times four quadrants , bowel sounds positive no masses or organomegaly, negative tenderness guarding or rebound  Neurological exam unremarkable- DTRs in upper and lower extremities within normal limits.   skin -no lesions noted      There were no vitals taken for this visit.    Allergies   Allergen Reactions    Bupropion Hcl Dermatitis, Hives and Itching    Cefdinir Diarrhea and Hives    Cyclosporine Hives    Phentermine Cardiac arrhythmia/arrest    Strattera [Atomoxetine] Unknown    Latex Rash    Quinolones Rash    Sulfa (Sulfonamide Antibiotics) Hives and Rash       Assessment/Plan   Problem List Items Addressed This Visit    None        If anything worsens or changes please call us at once, follow up in the  office as planned,

## 2025-01-15 ENCOUNTER — APPOINTMENT (OUTPATIENT)
Dept: PRIMARY CARE | Facility: CLINIC | Age: 40
End: 2025-01-15
Payer: COMMERCIAL

## 2025-01-15 DIAGNOSIS — Z13.220 LIPID SCREENING: ICD-10-CM

## 2025-01-15 DIAGNOSIS — Z00.00 ENCOUNTER FOR GENERAL HEALTH EXAMINATION: ICD-10-CM

## 2025-01-15 DIAGNOSIS — F41.9 ANXIETY: ICD-10-CM

## 2025-01-15 DIAGNOSIS — F41.1 GENERALIZED ANXIETY DISORDER: ICD-10-CM

## 2025-01-30 ENCOUNTER — DOCUMENTATION (OUTPATIENT)
Dept: PRIMARY CARE | Facility: CLINIC | Age: 40
End: 2025-01-30
Payer: COMMERCIAL

## 2025-01-30 DIAGNOSIS — F41.1 GENERALIZED ANXIETY DISORDER: Primary | ICD-10-CM

## 2025-01-30 DIAGNOSIS — F32.A DEPRESSION, UNSPECIFIED DEPRESSION TYPE: ICD-10-CM

## 2025-01-30 PROCEDURE — 99493 SBSQ PSYC COLLAB CARE MGMT: CPT | Performed by: FAMILY MEDICINE

## 2025-02-06 ENCOUNTER — APPOINTMENT (OUTPATIENT)
Dept: PRIMARY CARE | Facility: CLINIC | Age: 40
End: 2025-02-06
Payer: COMMERCIAL

## 2025-02-06 ASSESSMENT — PATIENT HEALTH QUESTIONNAIRE - PHQ9
4. FEELING TIRED OR HAVING LITTLE ENERGY: NOT AT ALL
SUM OF ALL RESPONSES TO PHQ9 QUESTIONS 1 & 2: 2
6. FEELING BAD ABOUT YOURSELF - OR THAT YOU ARE A FAILURE OR HAVE LET YOURSELF OR YOUR FAMILY DOWN: NOT AT ALL
5. POOR APPETITE OR OVEREATING: SEVERAL DAYS
10. IF YOU CHECKED OFF ANY PROBLEMS, HOW DIFFICULT HAVE THESE PROBLEMS MADE IT FOR YOU TO DO YOUR WORK, TAKE CARE OF THINGS AT HOME, OR GET ALONG WITH OTHER PEOPLE: SOMEWHAT DIFFICULT
SUM OF ALL RESPONSES TO PHQ QUESTIONS 1-9: 7
3. TROUBLE FALLING OR STAYING ASLEEP: MORE THAN HALF THE DAYS
7. TROUBLE CONCENTRATING ON THINGS, SUCH AS READING THE NEWSPAPER OR WATCHING TELEVISION: SEVERAL DAYS
1. LITTLE INTEREST OR PLEASURE IN DOING THINGS: SEVERAL DAYS
8. MOVING OR SPEAKING SO SLOWLY THAT OTHER PEOPLE COULD HAVE NOTICED. OR THE OPPOSITE, BEING SO FIGETY OR RESTLESS THAT YOU HAVE BEEN MOVING AROUND A LOT MORE THAN USUAL: SEVERAL DAYS
9. THOUGHTS THAT YOU WOULD BE BETTER OFF DEAD, OR OF HURTING YOURSELF: NOT AT ALL
2. FEELING DOWN, DEPRESSED OR HOPELESS: SEVERAL DAYS

## 2025-02-06 ASSESSMENT — ANXIETY QUESTIONNAIRES
2. NOT BEING ABLE TO STOP OR CONTROL WORRYING: SEVERAL DAYS
3. WORRYING TOO MUCH ABOUT DIFFERENT THINGS: SEVERAL DAYS
IF YOU CHECKED OFF ANY PROBLEMS ON THIS QUESTIONNAIRE, HOW DIFFICULT HAVE THESE PROBLEMS MADE IT FOR YOU TO DO YOUR WORK, TAKE CARE OF THINGS AT HOME, OR GET ALONG WITH OTHER PEOPLE: SOMEWHAT DIFFICULT
GAD7 TOTAL SCORE: 7
6. BECOMING EASILY ANNOYED OR IRRITABLE: SEVERAL DAYS
5. BEING SO RESTLESS THAT IT IS HARD TO SIT STILL: SEVERAL DAYS
4. TROUBLE RELAXING: SEVERAL DAYS
7. FEELING AFRAID AS IF SOMETHING AWFUL MIGHT HAPPEN: NOT AT ALL
1. FEELING NERVOUS, ANXIOUS, OR ON EDGE: MORE THAN HALF THE DAYS

## 2025-02-06 NOTE — PROGRESS NOTES
"Collaborative Care (CoCM)  Progress Note    Type of Interaction: Virtual    Start Time: 9:32 AM    End Time: 10:05 AM    Appointment: Scheduled    Reason for Visit:   Chief Complaint   Patient presents with    Anxiety    Depression    Review of progress made toward tx goals.  Review of psych consult recommendations.    Interval History / Patient Symptoms:   Overall, pt doing well.  Pt reported some increase in anxiety last week d/t child having anger outbursts and him being home from school ill.  Pt sleeping better but still waking at 3:30 most mornings and sometimes trouble falling back to sleep.    Patient Health Questionnaire-9 Score: 7 (2/6/2025 10:08 AM)  DOMINIC-7 Total Score: 7 (2/6/2025 10:08 AM)    Interventions Provided: Develop Coping Strategies, Review Progress/Goals Stress Management, Treatment Planning, and Processed recent stressors in household; provided support and validation/feedback for concerns.  Applauded pt's hard work she does for her family and maintaining sobriety for 6 months.  Identified progress in MH since this change.  Identified coping skills pt uses when stressed at home.  Provided psychoeducation on protective factors coping skills worksheet and encouraged pt to complete this before next appointment.     Progress Made: Moderate    Response to Intervention: Pt agreed to complete worksheet.  Pt discussed that she does have family connected with MH supports re: pt's  needs.  Pt stated she does want to try Trazadone and plans to reach out to PCP.   Pt reported she feels she has been able to \"let things go easier\" and not \"obsess\".  Pt looking forward to upcoming trip with her family to [facility] over spring break.    Plan: Please complete Protective Factor worksheet and maintain coping skills.    Patient Instructions   Please complete Protective Factors worksheet to discuss at next appointment.    Follow Up / Next Appointment: Next appointment: 02/27/25  "

## 2025-02-07 ENCOUNTER — DOCUMENTATION (OUTPATIENT)
Dept: PRIMARY CARE | Facility: CLINIC | Age: 40
End: 2025-02-07
Payer: COMMERCIAL

## 2025-02-07 NOTE — PROGRESS NOTES
[facility] sent email to patient re: resources and support with struggles she is having with her adoptive son.

## 2025-02-27 ENCOUNTER — APPOINTMENT (OUTPATIENT)
Dept: PRIMARY CARE | Facility: CLINIC | Age: 40
End: 2025-02-27
Payer: COMMERCIAL

## 2025-02-27 ASSESSMENT — PATIENT HEALTH QUESTIONNAIRE - PHQ9
SUM OF ALL RESPONSES TO PHQ9 QUESTIONS 1 & 2: 0
7. TROUBLE CONCENTRATING ON THINGS, SUCH AS READING THE NEWSPAPER OR WATCHING TELEVISION: SEVERAL DAYS
8. MOVING OR SPEAKING SO SLOWLY THAT OTHER PEOPLE COULD HAVE NOTICED. OR THE OPPOSITE, BEING SO FIGETY OR RESTLESS THAT YOU HAVE BEEN MOVING AROUND A LOT MORE THAN USUAL: NOT AT ALL
10. IF YOU CHECKED OFF ANY PROBLEMS, HOW DIFFICULT HAVE THESE PROBLEMS MADE IT FOR YOU TO DO YOUR WORK, TAKE CARE OF THINGS AT HOME, OR GET ALONG WITH OTHER PEOPLE: SOMEWHAT DIFFICULT
1. LITTLE INTEREST OR PLEASURE IN DOING THINGS: NOT AT ALL
3. TROUBLE FALLING OR STAYING ASLEEP: MORE THAN HALF THE DAYS
2. FEELING DOWN, DEPRESSED OR HOPELESS: NOT AT ALL
4. FEELING TIRED OR HAVING LITTLE ENERGY: MORE THAN HALF THE DAYS
6. FEELING BAD ABOUT YOURSELF - OR THAT YOU ARE A FAILURE OR HAVE LET YOURSELF OR YOUR FAMILY DOWN: NOT AT ALL
5. POOR APPETITE OR OVEREATING: SEVERAL DAYS
SUM OF ALL RESPONSES TO PHQ QUESTIONS 1-9: 6
9. THOUGHTS THAT YOU WOULD BE BETTER OFF DEAD, OR OF HURTING YOURSELF: NOT AT ALL

## 2025-02-27 ASSESSMENT — ANXIETY QUESTIONNAIRES
4. TROUBLE RELAXING: NOT AT ALL
5. BEING SO RESTLESS THAT IT IS HARD TO SIT STILL: NOT AT ALL
2. NOT BEING ABLE TO STOP OR CONTROL WORRYING: NOT AT ALL
IF YOU CHECKED OFF ANY PROBLEMS ON THIS QUESTIONNAIRE, HOW DIFFICULT HAVE THESE PROBLEMS MADE IT FOR YOU TO DO YOUR WORK, TAKE CARE OF THINGS AT HOME, OR GET ALONG WITH OTHER PEOPLE: SOMEWHAT DIFFICULT
7. FEELING AFRAID AS IF SOMETHING AWFUL MIGHT HAPPEN: NOT AT ALL
3. WORRYING TOO MUCH ABOUT DIFFERENT THINGS: SEVERAL DAYS
GAD7 TOTAL SCORE: 3
1. FEELING NERVOUS, ANXIOUS, OR ON EDGE: MORE THAN HALF THE DAYS
6. BECOMING EASILY ANNOYED OR IRRITABLE: NOT AT ALL

## 2025-02-27 NOTE — PROGRESS NOTES
Collaborative Care (CoCM)  Progress Note    Type of Interaction: In Office    Start Time: 1:00 PM    End Time: 1:38 PM    Appointment: Scheduled    Reason for Visit:   Chief Complaint   Patient presents with    Anxiety    Depression    Review of progress made while in COCM with PHQ and DOMINIC scores; tx planning and next session relapse prevention.    Interval History / Patient Symptoms:   Pt reports she is doing well and maintain her progress,  Pt agreeable to closing COCM and working on Relapse Prevention plan at next appointment.  Pt still having issues sleeping through the night and not waking feeling rested.  Pt aware she can reach out to PCP for Trazadone and other sleep-related studies if interested.    Patient Health Questionnaire-9 Score: 6 (2/27/2025  1:44 PM)  DOMINIC-7 Total Score: 3 (2/27/2025  1:45 PM)    Interventions Provided: Psychoeducation, Problem Solving Treatment, Motivational Interviewing, Strengths Exploration, Review Progress/Goals Stress Management, and Identified progress made while in COCM; Relapse Prevention form to be completed at next appointment and talk about closing services since pt meeting graduation criteria.  Facilitated CBTi on ways to improve sleep and things she could talk to PCP about if interested in exploring sleep issues in more detail.  Applauded pt for her hard work using coping skills and sobriety.      Progress Made: Significant    Response to Intervention: Pt reports she feels she has improved greatly with her self regulation and not as anxious as she was before.  Pt pleased with medication and sobriety that has helped her feel more mental clarity.  Pt aware she can speak to PCP about sleep related issues.  Pt agreeable with Relapse Prevention and closing services.  Pt feels she has reached goals she was wanting to work on.      Plan: Pt to work on CBTi skills from session.  Pt aware she can speak to PCP about medication recommendations and sleep issues.    Patient  Instructions   Review and practice coping skills.  Maintain healthy lifestyle choices,    Follow Up / Next Appointment: Next appointment: 03/27/25

## 2025-02-28 ENCOUNTER — DOCUMENTATION (OUTPATIENT)
Dept: PRIMARY CARE | Facility: CLINIC | Age: 40
End: 2025-02-28
Payer: COMMERCIAL

## 2025-02-28 DIAGNOSIS — F32.A DEPRESSION, UNSPECIFIED DEPRESSION TYPE: ICD-10-CM

## 2025-02-28 DIAGNOSIS — F41.1 GENERALIZED ANXIETY DISORDER: Primary | ICD-10-CM

## 2025-03-27 ENCOUNTER — APPOINTMENT (OUTPATIENT)
Dept: PRIMARY CARE | Facility: CLINIC | Age: 40
End: 2025-03-27
Payer: COMMERCIAL

## 2025-03-27 ENCOUNTER — OFFICE VISIT (OUTPATIENT)
Dept: PRIMARY CARE | Facility: CLINIC | Age: 40
End: 2025-03-27
Payer: COMMERCIAL

## 2025-03-27 VITALS
SYSTOLIC BLOOD PRESSURE: 110 MMHG | DIASTOLIC BLOOD PRESSURE: 78 MMHG | OXYGEN SATURATION: 97 % | HEART RATE: 80 BPM | BODY MASS INDEX: 41.67 KG/M2 | TEMPERATURE: 97.5 F | WEIGHT: 227.8 LBS

## 2025-03-27 DIAGNOSIS — F41.1 GENERALIZED ANXIETY DISORDER: ICD-10-CM

## 2025-03-27 DIAGNOSIS — J45.21 MILD INTERMITTENT ASTHMA WITH EXACERBATION (HHS-HCC): Primary | ICD-10-CM

## 2025-03-27 PROCEDURE — 99214 OFFICE O/P EST MOD 30 MIN: CPT | Performed by: NURSE PRACTITIONER

## 2025-03-27 RX ORDER — BENZONATATE 100 MG/1
100 CAPSULE ORAL EVERY 8 HOURS PRN
COMMUNITY
Start: 2025-03-21 | End: 2025-03-28

## 2025-03-27 RX ORDER — AZITHROMYCIN 250 MG/1
250 TABLET, FILM COATED ORAL DAILY
Qty: 6 TABLET | Refills: 0 | Status: SHIPPED | OUTPATIENT
Start: 2025-03-27 | End: 2025-04-01

## 2025-03-27 RX ORDER — METHYLPREDNISOLONE 4 MG/1
TABLET ORAL
Qty: 21 TABLET | Refills: 0 | Status: SHIPPED | OUTPATIENT
Start: 2025-03-27 | End: 2025-04-02

## 2025-03-27 RX ORDER — FLUOXETINE HYDROCHLORIDE 40 MG/1
40 CAPSULE ORAL DAILY
Qty: 90 CAPSULE | Refills: 1 | Status: SHIPPED | OUTPATIENT
Start: 2025-03-27

## 2025-03-27 ASSESSMENT — ENCOUNTER SYMPTOMS
TROUBLE SWALLOWING: 0
OCCASIONAL FEELINGS OF UNSTEADINESS: 0
ABDOMINAL PAIN: 0
FEVER: 0
ARTHRALGIAS: 0
EYE REDNESS: 0
SINUS PAIN: 0
ACTIVITY CHANGE: 0
CONSTIPATION: 0
SORE THROAT: 1
DIAPHORESIS: 0
PALPITATIONS: 0
CHILLS: 0
RHINORRHEA: 0
MYALGIAS: 0
DIARRHEA: 0
SINUS PRESSURE: 0
NAUSEA: 0
APPETITE CHANGE: 0
SHORTNESS OF BREATH: 0
CHEST TIGHTNESS: 0
DEPRESSION: 1
COUGH: 1
WHEEZING: 0
HEADACHES: 0
VOMITING: 0
FATIGUE: 0
NECK STIFFNESS: 0
EYE DISCHARGE: 0
LOSS OF SENSATION IN FEET: 0

## 2025-03-27 ASSESSMENT — ANXIETY QUESTIONNAIRES
7. FEELING AFRAID AS IF SOMETHING AWFUL MIGHT HAPPEN: NOT AT ALL
GAD7 TOTAL SCORE: 6
1. FEELING NERVOUS, ANXIOUS, OR ON EDGE: SEVERAL DAYS
2. NOT BEING ABLE TO STOP OR CONTROL WORRYING: SEVERAL DAYS
6. BECOMING EASILY ANNOYED OR IRRITABLE: SEVERAL DAYS
5. BEING SO RESTLESS THAT IT IS HARD TO SIT STILL: MORE THAN HALF THE DAYS
4. TROUBLE RELAXING: NOT AT ALL
IF YOU CHECKED OFF ANY PROBLEMS ON THIS QUESTIONNAIRE, HOW DIFFICULT HAVE THESE PROBLEMS MADE IT FOR YOU TO DO YOUR WORK, TAKE CARE OF THINGS AT HOME, OR GET ALONG WITH OTHER PEOPLE: SOMEWHAT DIFFICULT
3. WORRYING TOO MUCH ABOUT DIFFERENT THINGS: SEVERAL DAYS

## 2025-03-27 ASSESSMENT — PATIENT HEALTH QUESTIONNAIRE - PHQ9
6. FEELING BAD ABOUT YOURSELF - OR THAT YOU ARE A FAILURE OR HAVE LET YOURSELF OR YOUR FAMILY DOWN: NOT AT ALL
5. POOR APPETITE OR OVEREATING: NOT AT ALL
1. LITTLE INTEREST OR PLEASURE IN DOING THINGS: NOT AT ALL
4. FEELING TIRED OR HAVING LITTLE ENERGY: SEVERAL DAYS
SUM OF ALL RESPONSES TO PHQ QUESTIONS 1-9: 4
8. MOVING OR SPEAKING SO SLOWLY THAT OTHER PEOPLE COULD HAVE NOTICED. OR THE OPPOSITE, BEING SO FIGETY OR RESTLESS THAT YOU HAVE BEEN MOVING AROUND A LOT MORE THAN USUAL: NOT AT ALL
9. THOUGHTS THAT YOU WOULD BE BETTER OFF DEAD, OR OF HURTING YOURSELF: NOT AT ALL
3. TROUBLE FALLING OR STAYING ASLEEP: SEVERAL DAYS
10. IF YOU CHECKED OFF ANY PROBLEMS, HOW DIFFICULT HAVE THESE PROBLEMS MADE IT FOR YOU TO DO YOUR WORK, TAKE CARE OF THINGS AT HOME, OR GET ALONG WITH OTHER PEOPLE: SOMEWHAT DIFFICULT
2. FEELING DOWN, DEPRESSED OR HOPELESS: NOT AT ALL
SUM OF ALL RESPONSES TO PHQ9 QUESTIONS 1 & 2: 0
7. TROUBLE CONCENTRATING ON THINGS, SUCH AS READING THE NEWSPAPER OR WATCHING TELEVISION: MORE THAN HALF THE DAYS

## 2025-03-27 NOTE — PROGRESS NOTES
Subjective   Patient ID: Cassia Martin is a 39 y.o. female who presents for Cough, Sore Throat, and Nasal Congestion.    HPI   Patient went to urgent care about 12 days ago for a sore throat, sinus congestion, and a cough.  Initially had a low-grade fever.  She was prescribed tessalon perles , but states it lundberg not help with the cough. Sick for 19 days total.  Negative for strep in urgent care. Sore throat and lymphadenopathy have improved, cough persists.     Review of Systems   Constitutional:  Negative for activity change, appetite change, chills, diaphoresis, fatigue and fever.   HENT:  Positive for congestion and sore throat. Negative for ear discharge, ear pain, postnasal drip, rhinorrhea, sinus pressure, sinus pain, sneezing and trouble swallowing.    Eyes:  Negative for discharge and redness.   Respiratory:  Positive for cough. Negative for chest tightness, shortness of breath and wheezing.    Cardiovascular:  Negative for chest pain, palpitations and leg swelling.   Gastrointestinal:  Negative for abdominal pain, constipation, diarrhea, nausea and vomiting.   Musculoskeletal:  Negative for arthralgias, myalgias and neck stiffness.   Skin:  Negative for rash.   Neurological:  Negative for headaches.     Objective   /78 (BP Location: Left arm, Patient Position: Sitting, BP Cuff Size: Large adult)   Pulse 80   Temp 36.4 °C (97.5 °F) (Temporal)   Wt 103 kg (227 lb 12.8 oz)   SpO2 97%   BMI 41.67 kg/m²     Physical Exam  Vitals and nursing note reviewed.   Constitutional:       General: She is not in acute distress.     Appearance: Normal appearance. She is not ill-appearing.   HENT:      Head: Normocephalic and atraumatic.      Right Ear: Tympanic membrane, ear canal and external ear normal. No drainage or swelling. No middle ear effusion. No mastoid tenderness. Tympanic membrane is not erythematous, retracted or bulging.      Left Ear: Tympanic membrane, ear canal and external ear normal. No  drainage or swelling.  No middle ear effusion. No mastoid tenderness. Tympanic membrane is not erythematous, retracted or bulging.      Nose: No congestion or rhinorrhea.      Right Sinus: No maxillary sinus tenderness or frontal sinus tenderness.      Left Sinus: No maxillary sinus tenderness or frontal sinus tenderness.      Mouth/Throat:      Mouth: Mucous membranes are moist.      Tongue: No lesions.      Palate: No mass and lesions.      Pharynx: Uvula midline. No oropharyngeal exudate, posterior oropharyngeal erythema, uvula swelling or postnasal drip.      Tonsils: No tonsillar exudate or tonsillar abscesses.   Eyes:      General:         Right eye: No discharge.         Left eye: No discharge.      Extraocular Movements: Extraocular movements intact.      Conjunctiva/sclera: Conjunctivae normal.      Pupils: Pupils are equal, round, and reactive to light.   Cardiovascular:      Rate and Rhythm: Normal rate and regular rhythm.      Heart sounds: No murmur heard.  Pulmonary:      Effort: Pulmonary effort is normal. No respiratory distress.      Breath sounds: Normal air entry. Wheezing (faint expiratory) present.   Musculoskeletal:      Right lower leg: No edema.      Left lower leg: No edema.   Lymphadenopathy:      Cervical: No cervical adenopathy.   Skin:     General: Skin is warm and dry.      Coloration: Skin is not jaundiced.      Findings: No erythema or rash.   Neurological:      General: No focal deficit present.      Mental Status: She is alert. Mental status is at baseline.   Psychiatric:         Mood and Affect: Mood normal.         Behavior: Behavior normal.         Thought Content: Thought content normal.        Assessment & Plan  1. Mild intermittent asthma with exacerbation ([facility])  -azithromycin (Zithromax) 250 mg tablet  -methylPREDNISolone (Medrol Dospak) 4 mg tablets    -Continue supportive care with OTC medications as needed, rest, fluids. Let us know if symptoms persist or fail to  completely resolve to baseline with current treatment. Verbalized understanding.        2. Generalized anxiety disorder  Refill on chronic medication.  -FLUoxetine (PROzac) 40 mg capsule        .Reviewed/discussed treatment options and health maintenance. Take medications as prescribed. We will contact you with the results of any ordered testing; please send a [employer] message or call the office if you do not hear from us. Follow up in the office with your PCP Vincent Travis, DO as already discussed/scheduled and as needed. Return sooner if symptoms do not resolve as expected.      Jumana Tsang, APRN-CNP, DNP  Family Nurse Practitioner  [facility]

## 2025-03-27 NOTE — PATIENT INSTRUCTIONS
Please sign and send back the Relapse Prevention plan.  Continue to refer to this plan for ongoing wellness.

## 2025-03-27 NOTE — PROGRESS NOTES
"Collaborative Care (CoCM)  Progress Note    Type of Interaction: Virtual    Start Time: 11:31 AM    End Time: 12:04 PM    Appointment: Scheduled    Reason for Visit:   Chief Complaint   Patient presents with    Anxiety    Relapse Prevention and review of PHQ/DOMINIC scores.  Pt graduated from COCM d/t meeting goals and met criteria of improvement in PHQ/DOMINIC.    Interval History / Patient Symptoms:   Pt has been ill the last few weeks but overall doing well with anxiety mgmt.  Pt pleased with COC services and feels she has reached the goals she made at the beginning.  Pt has maintain sobriety from alcohol and feeling a better QOL.    Patient Health Questionnaire-9 Score: 4 (3/27/2025 12:04 PM)  DOMINIC-7 Total Score: 6 (3/27/2025 12:04 PM)      Interventions Provided: Psychoeducation, Review Progress/Goals Stress Management, Treatment Planning, and Facilitated Relapse Prevention with session and discussed progress made.  Discussed plan in the future if she feels she is \"having a flare up\" with her MH sx.  Explored pt's feelings about COC and changes he has made for improvement in sx.     Progress Made: Significant    Response to Intervention: Pt feels she has reached her goals and does feel better mentally with eliminating alcohol.  Pt is aware of psych recommendations for sleep and that she can reach out to PCP if interested.  Pt feels ready to discontinue services and agreed to use her relapse prevention plan.  Pt aware she can talk to PCP or reach out to [facility] if MH declines in the future.    Plan: Utilize Relapse Prevention plan; maintain medications and MH support for son.  RP to be placed in Media section of chart.      Patient Instructions   Please sign and send back the Relapse Prevention plan.  Continue to refer to this plan for ongoing wellness.    Follow Up / Next Appointment:  No follow ups needed at this time.  "

## 2025-03-31 ENCOUNTER — DOCUMENTATION (OUTPATIENT)
Dept: PRIMARY CARE | Facility: CLINIC | Age: 40
End: 2025-03-31
Payer: COMMERCIAL

## 2025-03-31 DIAGNOSIS — F32.A DEPRESSION, UNSPECIFIED DEPRESSION TYPE: ICD-10-CM

## 2025-03-31 DIAGNOSIS — F41.1 GENERALIZED ANXIETY DISORDER: Primary | ICD-10-CM

## 2025-03-31 PROCEDURE — 99493 SBSQ PSYC COLLAB CARE MGMT: CPT | Performed by: FAMILY MEDICINE

## 2025-04-14 ENCOUNTER — TELEPHONE (OUTPATIENT)
Dept: PRIMARY CARE | Facility: CLINIC | Age: 40
End: 2025-04-14
Payer: COMMERCIAL

## 2025-04-14 DIAGNOSIS — J45.21 MILD INTERMITTENT ASTHMA WITH EXACERBATION (HHS-HCC): Primary | ICD-10-CM

## 2025-04-14 RX ORDER — ALBUTEROL SULFATE AND BUDESONIDE 90; 80 UG/1; UG/1
2 AEROSOL, METERED RESPIRATORY (INHALATION) EVERY 6 HOURS PRN
Qty: 5.9 G | Refills: 3 | Status: SHIPPED | OUTPATIENT
Start: 2025-04-14

## 2025-04-14 NOTE — TELEPHONE ENCOUNTER
Please advise patient message   Last office visit was 3/27/25    Cassia Martin [id number] [id number] Clinical Support Staff  Phone Number: [phone]     James Denney!    Is it possible I can get a rx for Airsupra or Albuterol inhaler?    Thank you

## 2025-04-25 ENCOUNTER — APPOINTMENT (OUTPATIENT)
Dept: PRIMARY CARE | Facility: CLINIC | Age: 40
End: 2025-04-25
Payer: COMMERCIAL

## 2025-05-01 ENCOUNTER — APPOINTMENT (OUTPATIENT)
Dept: ORTHOPEDIC SURGERY | Facility: CLINIC | Age: 40
End: 2025-05-01
Payer: COMMERCIAL

## 2025-05-06 ENCOUNTER — APPOINTMENT (OUTPATIENT)
Dept: ORTHOPEDIC SURGERY | Facility: CLINIC | Age: 40
End: 2025-05-06
Payer: COMMERCIAL

## 2025-05-19 ENCOUNTER — APPOINTMENT (OUTPATIENT)
Facility: CLINIC | Age: 40
End: 2025-05-19
Payer: COMMERCIAL

## 2025-05-19 VITALS
DIASTOLIC BLOOD PRESSURE: 85 MMHG | BODY MASS INDEX: 41.77 KG/M2 | HEIGHT: 62 IN | SYSTOLIC BLOOD PRESSURE: 122 MMHG | WEIGHT: 227 LBS

## 2025-05-19 DIAGNOSIS — K12.0 APHTHOUS ULCER OF TONGUE: ICD-10-CM

## 2025-05-19 DIAGNOSIS — K12.0 ORAL APHTHOUS ULCER: Primary | ICD-10-CM

## 2025-05-19 RX ORDER — ACYCLOVIR 800 MG/1
400 TABLET ORAL 3 TIMES DAILY
Qty: 8 TABLET | Refills: 0 | Status: SHIPPED | OUTPATIENT
Start: 2025-05-19 | End: 2025-05-24

## 2025-05-19 NOTE — PROGRESS NOTES
ENT Outpatient Consultation    Chief Complaint: Base of tongue pain  History Of Present Illness  Cassia Martin is a 40 y.o. female presents for evaluation of tongue pain.  She reports that this started about 3 months ago when she had a prolonged URI.  She had a significant sore throat at that time and antibiotics helped some of her pain.  Since the rest of her symptoms have returned to baseline, she has had fluctuating pain in the very back of her tongue.  She reports that the pain worsens when she moves her tongue from side-to-side.  She does have some pain when she swallows as well but it is at a localized spot.  No difficulty swallowing, no voice changes, no coughing or spitting up blood.  She is a non-smoker.    She reports that the pain fluctuates from right to left side and comes on acutely such as when she wakes up in the morning.  Currently the pain is located on the right side of the base of her tongue.  It typically fluctuates in location and severity every 2 to 3 days.  She does have a history of aphthous ulcers but has never taken antiviral therapy.     Past Medical History  She has a past medical history of Acute maxillary sinusitis, unspecified (06/01/2019), ADHD (attention deficit hyperactivity disorder) (10/31/23), Allergic, Anxiety, Body mass index (BMI) 37.0-37.9, adult (02/23/2022), Body mass index (BMI)40.0-44.9, adult (09/19/2022), Contact with and (suspected) exposure to covid-19 (10/31/2020), COVID-19 (11/05/2020), Depression, Disorder of the skin and subcutaneous tissue, unspecified (02/14/2020), Eating disorder, Morbid (severe) obesity due to excess calories (Multi) (09/19/2022), Other conditions influencing health status (10/31/2020), Other obesity due to excess calories (02/23/2022), Personal history of other drug therapy (11/07/2019), and Personal history of other specified conditions (12/07/2020).    Surgical History  She has a past surgical history that includes Other surgical  history (07/24/2019); Eye surgery (2019); [place] tooth extraction; and Knee surgery (Right).     Social History  She reports that she has never smoked. She has never used smokeless tobacco. She reports current alcohol use of about 1.0 standard drink of alcohol per week. She reports that she does not use drugs.    Family History  Family History[1]     Allergies  Bupropion hcl, Cefdinir, Cyclosporine, Phentermine, Strattera [atomoxetine], Latex, Quinolones, and Sulfa (sulfonamide antibiotics)     Physical Exam:  CONSTITUTIONAL:  No acute distress  VOICE:  No hoarseness or other abnormality  RESPIRATION:  Breathing comfortably, no stridor  EYES:  EOM intact, sclera normal  NEURO:  Alert and oriented times 3  HEAD AND FACE:  Symmetric facial features, no masses or lesions  EARS:  Normal external ears  NOSE:  External nose midline, anterior rhinoscopy shows left greater than right inferior turbinate hypertrophy  ORAL CAVITY/OROPHARYNX/LIPS:  Normal mucous membranes, normal floor of mouth/tongue/OP, no masses or lesions, no masses palpated in the base of tongue  PHARYNGEAL WALLS:  No masses or lesions  NECK/LYMPH:  No LAD, no thyroid masses, trachea midline  SKIN:  Neck skin is without scar or injury  PSYCH:  Alert and oriented with appropriate mood and affect    Procedure Note: Flexible Nasolaryngoscopy  Verbal informed consent was obtained from the patient/patient's guardian. 4% lidocaine mixed with phenylephrine was prepared and dripped into the nose. It was placed in the right and left naris. Following an appropriate amount of time to allow for adequate anesthesia, a flexible fiberoptic nasolaryngoscope was placed into the patient's right and left naris. The nasal cavity, nasopharynx, oropharynx, hypopharynx, and all endolaryngeal structures were visualized and were normal except as listed below. Significant findings included:  - Left greater than right inferior turbinate hypertrophy  - Symmetric lingual tonsil  "hypertrophy  - Inferior aspect of the tonsils appear normal bilaterally on endoscopic view  - With tongue protruded, there are 2 small, superficial ulcerations noted on the right side of the lingual tonsils (see photo).  These are ovoid and yellowish at the center with a ring of erythema and no evidence of bleeding or exophytic mass.   - Vocal cords mobile bilaterally, no significant LPR changes         Last Recorded Vitals  Blood pressure 122/85, height 1.575 m (5' 2\"), weight 103 kg (227 lb).    Relevant Results      No results found for this or any previous visit (from the past 24 hours).  Imaging  No results found.    Cardiology, Vascular, and Other Imaging  No other imaging results found for the past 7 days      Assessment and Plan  40 y.o. female with intermittent, fluctuating base of tongue pain with endoscopic finding today of superficial ulcerations.  The lesion appears consistent with a cold sore versus aphthous ulcer.  Given the duration of symptoms as well as the fluctuating nature at an unusual location, we will trial a course of acyclovir.  If this does not help, we will trial symptomatic treatment with topical Decadron versus Magic mouthwash.  Return to clinic in 4 to 6 weeks for repeat scope exam.    Problem List Items Addressed This Visit    None  Visit Diagnoses         Oral aphthous ulcer    -  Primary      Aphthous ulcer of tongue        Relevant Medications    acyclovir (Zovirax) 800 mg tablet            Bethany Márquez MD         [1]   Family History  Problem Relation Name Age of Onset    Asthma Father Josse     Hypertension Father Josse     Hypertension Maternal Grandmother Fatou     Mental illness Paternal Grandmother Vaibhav     Alcohol abuse Mother's Sister Diya      "

## 2025-06-30 ENCOUNTER — APPOINTMENT (OUTPATIENT)
Facility: CLINIC | Age: 40
End: 2025-06-30
Payer: COMMERCIAL

## 2025-06-30 VITALS
DIASTOLIC BLOOD PRESSURE: 90 MMHG | WEIGHT: 227 LBS | SYSTOLIC BLOOD PRESSURE: 131 MMHG | HEIGHT: 62 IN | BODY MASS INDEX: 41.77 KG/M2

## 2025-06-30 DIAGNOSIS — D37.02 NEOPLASM OF UNCERTAIN BEHAVIOR OF BASE OF TONGUE: ICD-10-CM

## 2025-06-30 DIAGNOSIS — R51.9 PAIN OF SCALP: Primary | ICD-10-CM

## 2025-06-30 PROCEDURE — 3008F BODY MASS INDEX DOCD: CPT | Performed by: STUDENT IN AN ORGANIZED HEALTH CARE EDUCATION/TRAINING PROGRAM

## 2025-06-30 PROCEDURE — 31575 DIAGNOSTIC LARYNGOSCOPY: CPT | Performed by: STUDENT IN AN ORGANIZED HEALTH CARE EDUCATION/TRAINING PROGRAM

## 2025-06-30 PROCEDURE — 99214 OFFICE O/P EST MOD 30 MIN: CPT | Performed by: STUDENT IN AN ORGANIZED HEALTH CARE EDUCATION/TRAINING PROGRAM

## 2025-06-30 PROCEDURE — 1036F TOBACCO NON-USER: CPT | Performed by: STUDENT IN AN ORGANIZED HEALTH CARE EDUCATION/TRAINING PROGRAM

## 2025-06-30 RX ORDER — CLINDAMYCIN PHOSPHATE 10 MG/ML
SOLUTION TOPICAL
COMMUNITY
Start: 2025-06-23

## 2025-06-30 NOTE — PROGRESS NOTES
ENT Outpatient Consultation    Chief Complaint: Base of tongue pain    Interval History: After the antiviral provided at last visit, her right base of tongue pain resolved. She had no pain for about a month. In the last week, she has developed some new mild pain on the left side of her tongue base. No painful spots in her oral cavity. It has improved somewhat in the last week.  Concurrently she has had a shocklike sensation to the left side of her head/scalp.  She reports that a few days ago it felt like she could not touch her scalp due to the sensitivity/tenderness.  She has been taking ibuprofen around-the-clock.  Now in the last day symptoms have started to improve.  She reports that this has been occurring intermittently since she was a child.  It is not present on only 1 portion of the face but rather on a broad swath of her scalp. No voice changes. No dysphagia. No hemoptysis.    History Of Present Illness (recall 5/19/15)  Cassia Martin is a 40 y.o. female presents for evaluation of tongue pain.  She reports that this started about 3 months ago when she had a prolonged URI.  She had a significant sore throat at that time and antibiotics helped some of her pain.  Since the rest of her symptoms have returned to baseline, she has had fluctuating pain in the very back of her tongue.  She reports that the pain worsens when she moves her tongue from side-to-side.  She does have some pain when she swallows as well but it is at a localized spot.  No difficulty swallowing, no voice changes, no coughing or spitting up blood.  She is a non-smoker.    She reports that the pain fluctuates from right to left side and comes on acutely such as when she wakes up in the morning.  Currently the pain is located on the right side of the base of her tongue.  It typically fluctuates in location and severity every 2 to 3 days.  She does have a history of aphthous ulcers but has never taken antiviral therapy.     Past Medical  History  She has a past medical history of Acute maxillary sinusitis, unspecified (06/01/2019), ADHD (attention deficit hyperactivity disorder) (10/31/23), Allergic, Anxiety, Asthma (2003), Body mass index (BMI) 37.0-37.9, adult (02/23/2022), Body mass index (BMI)40.0-44.9, adult (09/19/2022), Contact with and (suspected) exposure to covid-19 (10/31/2020), COVID-19 (11/05/2020), Depression, Disorder of the skin and subcutaneous tissue, unspecified (02/14/2020), Eating disorder, Morbid (severe) obesity due to excess calories (Multi) (09/19/2022), Other conditions influencing health status (10/31/2020), Other obesity due to excess calories (02/23/2022), Personal history of other drug therapy (11/07/2019), Personal history of other specified conditions (12/07/2020), Tinnitus (2023), and TMJ dysfunction (2015).    Surgical History  She has a past surgical history that includes Other surgical history (07/24/2019); Eye surgery (2019); [place] tooth extraction; and Knee surgery (Right).     Social History  She reports that she has never smoked. She has never used smokeless tobacco. She reports that she does not currently use alcohol. She reports that she does not use drugs.    Family History  Family History[1]     Allergies  Bupropion hcl, Cefdinir, Cyclosporine, Phentermine, Strattera [atomoxetine], Latex, Quinolones, and Sulfa (sulfonamide antibiotics)     Physical Exam:  CONSTITUTIONAL:  No acute distress  VOICE:  No hoarseness or other abnormality  RESPIRATION:  Breathing comfortably, no stridor  EYES:  EOM intact, sclera normal  NEURO:  Alert and oriented times 3  HEAD AND FACE:  Symmetric facial features, no masses or lesions  EARS:  Normal external ears, EAC pain, TM intact without effusion or retraction bilaterally  NOSE:  External nose midline, anterior rhinoscopy shows left greater than right inferior turbinate hypertrophy  ORAL CAVITY/OROPHARYNX/LIPS:  Normal mucous membranes, normal floor of mouth/tongue/OP, no  "masses or lesions, no masses palpated in the base of tongue  PHARYNGEAL WALLS:  No masses or lesions  NECK/LYMPH:  No LAD, no thyroid masses, trachea midline  SKIN:  Neck skin is without scar or injury  PSYCH:  Alert and oriented with appropriate mood and affect    Procedure Note: Flexible Nasolaryngoscopy  Verbal informed consent was obtained from the patient/patient's guardian. 4% lidocaine mixed with phenylephrine was prepared and dripped into the nose. It was placed in the right and left naris. Following an appropriate amount of time to allow for adequate anesthesia, a flexible fiberoptic nasolaryngoscope was placed into the patient's right and left naris. The nasal cavity, nasopharynx, oropharynx, hypopharynx, and all endolaryngeal structures were visualized and were normal except as listed below. Significant findings included:  - Left greater than right inferior turbinate hypertrophy  - Symmetric lingual tonsil hypertrophy  - Inferior aspect of the tonsils appear normal bilaterally on endoscopic view  - With tongue protruded, there are similar superficial ulcerations present on the right lingual tonsils.  These are ovoid and yellowish at the center with a ring of erythema and no evidence of bleeding or exophytic mass. There is a new spot present on the left BOT as well.  - Vocal cords mobile bilaterally, no significant LPR changes       Right BOT     Left BOT       Last Recorded Vitals  Blood pressure 131/90, height 1.575 m (5' 2\"), weight 103 kg (227 lb).    Relevant Results      No results found for this or any previous visit (from the past 24 hours).  Imaging  No results found.    Cardiology, Vascular, and Other Imaging  No other imaging results found for the past 7 days      Assessment and Plan  40 y.o. female with intermittent, fluctuating base of tongue pain with endoscopic finding today of superficial ulcerations.  She had prompt improvement in her right-sided pain after treatment with acyclovir.  " "However, her pain has recurred along the left side of her base of tongue.  Interestingly, although her right sided tongue pain has resolved, the appearance of the superficial ulcerations remains the same.  I would like to give her more time for these lesions to heal over.  I instructed her to let me know if the pain worsens at any point, and I will send her Magic mouthwash to promote healing.  We did briefly discuss possibly proceeding to the operating room for biopsy of this lesion.  We will hold off pending continued healing at this time.    She continues to have no red flag symptoms.  She also continues to note that this fluctuating pain has occurred on and off throughout most of her life.  Additionally, she has has longstanding intermittent \"shocklike\" pain along her left scalp.  This does not run in any particular trigeminal distribution.  We discussed a referral to neurology for consideration of underlying neuropathic pain.  If pain persist at next visit, I will place a referral to neurology.    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

## 2025-08-01 ENCOUNTER — TELEPHONE (OUTPATIENT)
Dept: PRIMARY CARE | Facility: CLINIC | Age: 40
End: 2025-08-01
Payer: COMMERCIAL

## 2025-08-01 DIAGNOSIS — R53.81 MALAISE AND FATIGUE: Primary | ICD-10-CM

## 2025-08-01 DIAGNOSIS — R53.83 MALAISE AND FATIGUE: Primary | ICD-10-CM

## 2025-08-01 DIAGNOSIS — R53.1 WEAKNESS: ICD-10-CM

## 2025-08-01 NOTE — TELEPHONE ENCOUNTER
Please advise, lab added    Cassia Martin to [facility] [id number] [id number] Clinical Support Staff (supporting Vincent Travis DO) (Selected Message)        8/1/25  8:05 AM  James LI,     Is it possible you can order a Lyme test? I have other bloodwork coming up and wanted to do that. I’ve been having these awful headaches and nerve pain issues in my ear going on for a few years. It’s been increasing in frequency the last 6 months where it’s a weekly issue.     Thank you

## 2025-08-07 LAB — B BURGDOR IGG+IGM SER QL IA: <=0.9 INDEX

## 2025-09-29 ENCOUNTER — APPOINTMENT (OUTPATIENT)
Facility: CLINIC | Age: 40
End: 2025-09-29
Payer: COMMERCIAL

## (undated) DEVICE — NEEDLE, HYPODERMIC, SPECIALTY, REGULAR WALL, SHORT BEVEL, 18 G X 1.5 IN

## (undated) DEVICE — BLADE, STRYKER, 4.0MM, ANGLED, AGGRESSIVE PLUS

## (undated) DEVICE — TOWEL PACK, STERILE, 4/PACK, BLUE

## (undated) DEVICE — STRIP, SKIN CLOSURE, STERI STRIP, REINFORCED, 0.5 X 4 IN

## (undated) DEVICE — PADDING, WEBRIL, UNDERCAST, STERILE, 6 IN

## (undated) DEVICE — TUBING, PUMP MAIN 16FT STERILE

## (undated) DEVICE — BANDAGE, ESMARK, 6 IN X 12 FT

## (undated) DEVICE — BANDAGE, COFLEX, 6 X 5 YDS, FOAM TAN, STERILE, LF

## (undated) DEVICE — BLADE, STRYKER, 4.0MM, AGG PLUS SHVBLD ULTMT

## (undated) DEVICE — BLADE, STRYKER, 5.5MM, CUTTER AGG PLUS

## (undated) DEVICE — BANDAGE, ELASTIC, MATRIX, SELF-CLOSURE, 6 IN X 5 YD, LF

## (undated) DEVICE — DRESSING, ABDOMINAL, WET PRUF, TENDERSORB, 5 X 9 IN, STERILE

## (undated) DEVICE — DRESSING, GAUZE, SUPER KERLIX, 6X6

## (undated) DEVICE — SUTURE, MONOCRYL, 3-0, 27 IN, PS-2, UNDYED

## (undated) DEVICE — GLOVE, SURGICAL, PROTEXIS PI , 7.5, PF, LF

## (undated) DEVICE — Device

## (undated) DEVICE — GLOVE, SURGICAL, PROTEXIS PI BLUE W/NEUTHERA, 7.5, PF, LF

## (undated) DEVICE — GLOVE, SURGICAL, PROTEXIS PI W/NEU-THERA, 8.0, PF, LF

## (undated) DEVICE — APPLICATOR, CHLORAPREP, W/ORANGE TINT, 26ML

## (undated) DEVICE — DRESSING, GAUZE, PETROLATUM, PATCH, XEROFORM, 1 X 8 IN, STERILE